# Patient Record
Sex: FEMALE | Race: WHITE | NOT HISPANIC OR LATINO | Employment: UNEMPLOYED | URBAN - METROPOLITAN AREA
[De-identification: names, ages, dates, MRNs, and addresses within clinical notes are randomized per-mention and may not be internally consistent; named-entity substitution may affect disease eponyms.]

---

## 2017-10-25 ENCOUNTER — TRANSFERRED RECORDS (OUTPATIENT)
Dept: HEALTH INFORMATION MANAGEMENT | Facility: CLINIC | Age: 57
End: 2017-10-25

## 2017-12-06 ENCOUNTER — TRANSFERRED RECORDS (OUTPATIENT)
Dept: HEALTH INFORMATION MANAGEMENT | Facility: CLINIC | Age: 57
End: 2017-12-06

## 2018-09-07 NOTE — PROGRESS NOTES
"  SUBJECTIVE:   Mary Irizarry is a 58 year old female who presents to clinic today for the following health issues:          Would like thyroid checked, sensitive to cold, constipation, dry skin, leg cramps    Ear crackles     Pt with symptoms above  Was told she was hypothyroid by her chiropractor  He treated her with some compounded vitamins and she felt better but they were expensive  She would like to be rechecked    Pt also with crackling in left ear intermittently for past week or so.   No recent flight  No pain or hearing difficulties      Problem list and histories reviewed & adjusted, as indicated.  Additional history: as documented    Labs reviewed in EPIC    Reviewed and updated as needed this visit by clinical staff       Reviewed and updated as needed this visit by Provider         ROS:  Constitutional, HEENT, cardiovascular, pulmonary, gi and gu systems are negative, except as otherwise noted.    OBJECTIVE:     /82  Pulse 56  Temp 97.1  F (36.2  C) (Oral)  Resp 19  Ht 5' 9\" (1.753 m)  Wt 171 lb (77.6 kg)  SpO2 95%  BMI 25.25 kg/m2  Body mass index is 25.25 kg/(m^2).  GENERAL: healthy, alert and no distress  HENT: ear canals and TM's normal, nose and mouth without ulcers or lesions  NECK: no adenopathy, no asymmetry, masses, or scars and thyroid normal to palpation  RESP: lungs clear to auscultation - no rales, rhonchi or wheezes  CV: regular rate and rhythm, normal S1 S2, no S3 or S4, no murmur, click or rub, no peripheral edema and peripheral pulses strong    Diagnostic Test Results:  none     ASSESSMENT/PLAN:     1. Constipation, unspecified constipation type  As above  - T3 total  - T4 free  - TSH    2. Cold sensitivity  As above  - T3 total  - T4 free  - TSH    3. Dry skin  As above  - T3 total  - T4 free  - TSH    4. Dysfunction of left eustachian tube  Trial of otc flonase.           Carol Hidalgo MD  [facility]    "

## 2018-09-10 ENCOUNTER — OFFICE VISIT (OUTPATIENT)
Dept: FAMILY MEDICINE | Facility: CLINIC | Age: 58
End: 2018-09-10
Payer: COMMERCIAL

## 2018-09-10 VITALS
DIASTOLIC BLOOD PRESSURE: 82 MMHG | TEMPERATURE: 97.1 F | WEIGHT: 171 LBS | BODY MASS INDEX: 25.33 KG/M2 | OXYGEN SATURATION: 95 % | SYSTOLIC BLOOD PRESSURE: 139 MMHG | RESPIRATION RATE: 19 BRPM | HEART RATE: 56 BPM | HEIGHT: 69 IN

## 2018-09-10 DIAGNOSIS — K59.00 CONSTIPATION, UNSPECIFIED CONSTIPATION TYPE: Primary | ICD-10-CM

## 2018-09-10 DIAGNOSIS — R68.89 COLD SENSITIVITY: ICD-10-CM

## 2018-09-10 DIAGNOSIS — H69.92 DYSFUNCTION OF LEFT EUSTACHIAN TUBE: ICD-10-CM

## 2018-09-10 DIAGNOSIS — L85.3 DRY SKIN: ICD-10-CM

## 2018-09-10 LAB
T3 SERPL-MCNC: 91 NG/DL (ref 60–181)
T4 FREE SERPL-MCNC: 0.84 NG/DL (ref 0.76–1.46)
TSH SERPL DL<=0.005 MIU/L-ACNC: 1.03 MU/L (ref 0.4–4)

## 2018-09-10 PROCEDURE — 36415 COLL VENOUS BLD VENIPUNCTURE: CPT | Performed by: FAMILY MEDICINE

## 2018-09-10 PROCEDURE — 84480 ASSAY TRIIODOTHYRONINE (T3): CPT | Performed by: FAMILY MEDICINE

## 2018-09-10 PROCEDURE — 84439 ASSAY OF FREE THYROXINE: CPT | Performed by: FAMILY MEDICINE

## 2018-09-10 PROCEDURE — 99203 OFFICE O/P NEW LOW 30 MIN: CPT | Performed by: FAMILY MEDICINE

## 2018-09-10 PROCEDURE — 84443 ASSAY THYROID STIM HORMONE: CPT | Performed by: FAMILY MEDICINE

## 2018-09-10 NOTE — LETTER
September 11, 2018    Mary Irizarry  [address] [facility] [address]        Dear Mary,    All 3 of your thyroid labs are normal.  No evidence of thyroid disease.    Carol Hidalgo MD    Results for orders placed or performed in visit on 09/10/18   T3 total   Result Value Ref Range    Triiodothyronine (T3) 91 60 - 181 ng/dL   T4 free   Result Value Ref Range    T4 Free 0.84 0.76 - 1.46 ng/dL   TSH   Result Value Ref Range    TSH 1.03 0.40 - 4.00 mU/L

## 2018-09-10 NOTE — MR AVS SNAPSHOT
"              After Visit Summary   9/10/2018    Mary Irizarry    MRN: [id number]           Patient Information     Date Of Birth          1960        Visit Information        Provider Department      9/10/2018 7:40 AM Carol Parada MD [facility]        Today's Diagnoses     Constipation, unspecified constipation type    -  1    Cold sensitivity        Dry skin           Follow-ups after your visit        Who to contact     If you have questions or need follow up information about today's clinic visit or your schedule please contact [facility] directly at [phone].  Normal or non-critical lab and imaging results will be communicated to you by [employer], letter or phone within 4 business days after the clinic has received the results. If you do not hear from us within 7 days, please contact the clinic through [employer] or phone. If you have a critical or abnormal lab result, we will notify you by phone as soon as possible.  Submit refill requests through [employer] or call your pharmacy and they will forward the refill request to us. Please allow 3 business days for your refill to be completed.          Additional Information About Your Visit        MyChart Information     [employer] lets you send messages to your doctor, view your test results, renew your prescriptions, schedule appointments and more. To sign up, go to [email or web address] . Click on \"Log in\" on the left side of the screen, which will take you to the Welcome page. Then click on \"Sign up Now\" on the right side of the page.     You will be asked to enter the access code listed below, as well as some personal information. Please follow the directions to create your username and password.     Your access code is: [email or web address]  Expires: 2018  8:17 AM     Your access code will  in 90 days. If you need help or a new code, please call your [facility] or [phone].        Care EveryWhere ID     This " "is your Care EveryWhere ID. This could be used by other organizations to access your [place] medical records  [id number]        Your Vitals Were     Pulse Temperature Respirations Height Pulse Oximetry BMI (Body Mass Index)    56 97.1  F (36.2  C) (Oral) 19 5' 9\" (1.753 m) 95% 25.25 kg/m2       Blood Pressure from Last 3 Encounters:   09/10/18 139/82    Weight from Last 3 Encounters:   09/10/18 171 lb (77.6 kg)              We Performed the Following     T3 total     T4 free     TSH        Primary Care Provider Office Phone # Fax #    [facility] [place] [phone]       No address on file        Equal Access to Services     DENNIS ERICKSON : Ildefonso Lucero, sergio vásquez, qaminor kaalmada jh, artis vargas . So [facility] [phone].    ATENCIÓN: Si habla español, tiene a hallman disposición servicios gratuitos de asistencia lingüística. Llame al [phone].    We comply with applicable federal civil rights laws and [place] laws. We do not discriminate on the basis of race, color, national origin, age, disability, sex, sexual orientation, or gender identity.            Thank you!     Thank you for choosing [facility]  [facility]. Our goal is always to provide you with excellent care. Hearing back from our patients is one way we can continue to improve our services. Please take a few minutes to complete the written survey that you may receive in the mail after your visit with us. Thank you!             Your Updated Medication List - Protect others around you: Learn how to safely use, store and throw away your medicines at www.disposemymeds.org.      Notice  As of 9/10/2018  8:18 AM    You have not been prescribed any medications.      "

## 2018-10-11 ENCOUNTER — TELEPHONE (OUTPATIENT)
Dept: FAMILY MEDICINE | Facility: CLINIC | Age: 58
End: 2018-10-11

## 2018-10-26 NOTE — TELEPHONE ENCOUNTER
Mammo completed 12/6/2017 seen in Care Everywhere, not due until 12/6/2019, reflected in HM. Info sent to abstracting to update chart.

## 2018-12-10 ENCOUNTER — TELEPHONE (OUTPATIENT)
Dept: FAMILY MEDICINE | Facility: CLINIC | Age: 58
End: 2018-12-10

## 2018-12-10 NOTE — TELEPHONE ENCOUNTER
Please abstract the following data from this visit with this patient into the appropriate field in Epic:    Colonoscopy done on this date: 10-, by this group: [facility], results were repeat in 5 years.     Care everywhere was completed.

## 2018-12-11 ENCOUNTER — OFFICE VISIT (OUTPATIENT)
Dept: FAMILY MEDICINE | Facility: CLINIC | Age: 58
End: 2018-12-11
Payer: COMMERCIAL

## 2018-12-11 VITALS
TEMPERATURE: 97.4 F | SYSTOLIC BLOOD PRESSURE: 113 MMHG | HEART RATE: 63 BPM | RESPIRATION RATE: 16 BRPM | WEIGHT: 173 LBS | OXYGEN SATURATION: 97 % | DIASTOLIC BLOOD PRESSURE: 76 MMHG | BODY MASS INDEX: 25.55 KG/M2

## 2018-12-11 DIAGNOSIS — I73.00 RAYNAUD'S DISEASE WITHOUT GANGRENE: ICD-10-CM

## 2018-12-11 DIAGNOSIS — B00.9 HERPES SIMPLEX VIRUS INFECTION: Primary | ICD-10-CM

## 2018-12-11 DIAGNOSIS — Z78.0 POSTMENOPAUSAL STATUS: ICD-10-CM

## 2018-12-11 DIAGNOSIS — M79.642 PAIN IN BOTH HANDS: ICD-10-CM

## 2018-12-11 DIAGNOSIS — M79.641 PAIN IN BOTH HANDS: ICD-10-CM

## 2018-12-11 PROCEDURE — 99214 OFFICE O/P EST MOD 30 MIN: CPT | Performed by: INTERNAL MEDICINE

## 2018-12-11 RX ORDER — ACYCLOVIR 400 MG/1
TABLET ORAL
Refills: 3 | COMMUNITY
Start: 2018-12-07 | End: 2018-12-11

## 2018-12-11 RX ORDER — ACYCLOVIR 400 MG/1
TABLET ORAL
Qty: 90 TABLET | Refills: 3 | Status: SHIPPED | OUTPATIENT
Start: 2018-12-11 | End: 2020-03-11

## 2018-12-11 ASSESSMENT — PAIN SCALES - GENERAL: PAINLEVEL: NO PAIN (0)

## 2018-12-11 NOTE — LETTER
December 11, 2018      Mary Irizarry  [address] [facility] [address]        To Whom It May Concern:    Mary Irizarry was seen in our clinic. She has a medical condition that is exacerbated by cold.  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       Sincerely,        Dariana Martines MD

## 2018-12-11 NOTE — PROGRESS NOTES
SUBJECTIVE:  Mary Irizarry is an 58 year old female who presents for needing a note.  Wonders if in past has had frostbite in past and she has trouble with fingers and toes because of that when she is in the cold.  Her job is wanting her to go into the cooler and she has pain in fingers.  Fingers sometimes get whitish when they are cold.  She also is concerned about post-menopausal and has drier skin and some trouble sleeping and being constipated and sometimes a little ryan.  Not get night sweats any more.  Also has h/o herpes in genital area, on buttocks and on face.  Takes acyclovir when breaks out and then started daily dose for suppression which helped a lot, then stopped taking it and is now having more outbreaks.      PMH:  herpes    Social History     Socioeconomic History     Marital status: Single     Spouse name: None     Number of children: None     Years of education: None     Highest education level: None   Social Needs     Financial resource strain: None     Food insecurity - worry: None     Food insecurity - inability: None     Transportation needs - medical: None     Transportation needs - non-medical: None   Occupational History     None   Tobacco Use     Smoking status: Former Smoker     Smokeless tobacco: Never Used   Substance and Sexual Activity     Alcohol use: Yes     Drug use: No     Sexual activity: No   Other Topics Concern     Parent/sibling w/ CABG, MI or angioplasty before 65F 55M? Not Asked   Social History Narrative     None     Family History   Problem Relation Age of Onset     Diabetes Father        ALLERGIES:  Penicillins    Current Outpatient Medications   Medication     acyclovir (ZOVIRAX) 400 MG tablet     No current facility-administered medications for this visit.          ROS:  ROS is done and is negative for general/constitutional, eye, ENT, Respiratory, cardiovascular, GI, , Skin, musculoskeletal except as noted elsewhere.  All other review of systems negative  except as noted elsewhere.      OBJECTIVE:  /76   Pulse 63   Temp 97.4  F (36.3  C) (Oral)   Resp 16   Wt 78.5 kg (173 lb)   SpO2 97%   BMI 25.55 kg/m    GENERAL APPEARANCE: Alert, in no acute distress  EYES: normal  NOSE:normal  OROPHARYNX:normal  NECK:No adenopathy,masses or thyromegaly  RESP: normal and clear to auscultation  CV:regular rate and rhythm and no murmurs, clicks, or gallops  ABDOMEN: Abdomen soft, non-tender. BS normal. No masses, organomegaly  SKIN: no ulcers, lesions or rash  MUSCULOSKELETAL:Musculoskeletal normal      RESULTS  Results for orders placed or performed in visit on 09/10/18   T3 total   Result Value Ref Range    Triiodothyronine (T3) 91 60 - 181 ng/dL   T4 free   Result Value Ref Range    T4 Free 0.84 0.76 - 1.46 ng/dL   TSH   Result Value Ref Range    TSH 1.03 0.40 - 4.00 mU/L   .  No results found for this or any previous visit (from the past 48 hour(s)).    ASSESSMENT/PLAN:    ASSESSMENT / PLAN:  (B00.9) Herpes simplex virus infection  (primary encounter diagnosis)  Comment: pt has h/o this and did fairly well with suppressive daily therapy, but has had more flare ups since she stopped it  Plan: acyclovir (ZOVIRAX) 400 MG tablet        Advised pt to continue on daily suppressive therapy.  Will refill the acyclovir which she was on before. Reviewed medication instructions and side effects. Follow up if experiences side effects.. I reviewed supportive care, otc meds to use if needed, expected course, and signs of concern.  Follow up as needed or if she does not improveor if worsens in any way.  Reviewed red flag symptoms and is to go to the ER if experiences any of these.    (Z78.0) Postmenopausal status  Comment: will refer to gyn to address her concerns and sxs  Plan: OB/GYN REFERRAL        Pt to schedule with gyn    (M79.641,  M79.642) Pain in both hands  Comment: suspect pt has Raynauds as all fingers are affected and she does report some discoloration of fingers when  they are cold.  She does not have a specific incident of frostbite she recalls, but even as a child felt her fingers would hurt when they were cold.   Plan: note for work done.  Discussed medication options, but she doesn't feel it generally bad enough to want to take medications at this time. I reviewed supportive care, otc meds to use if needed, expected course, and signs of concern.  Follow up as needed or if worsens in any way.  Reviewed red flag symptoms and is to go to the ER if experiences any of these.      See [facility] for orders, medications, letters, patient instructions    Dariana Martines M.D.

## 2018-12-11 NOTE — NURSING NOTE
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    Weight as of this encounter: 78.5 kg (173 lb).  Medication Reconciliation: complete  Bruce Alfaro MA    "

## 2019-01-01 ENCOUNTER — NURSE TRIAGE (OUTPATIENT)
Dept: NURSING | Facility: CLINIC | Age: 59
End: 2019-01-01

## 2019-01-01 ENCOUNTER — OFFICE VISIT (OUTPATIENT)
Dept: URGENT CARE | Facility: URGENT CARE | Age: 59
End: 2019-01-01
Payer: COMMERCIAL

## 2019-01-01 VITALS
RESPIRATION RATE: 14 BRPM | DIASTOLIC BLOOD PRESSURE: 77 MMHG | HEART RATE: 71 BPM | HEIGHT: 69 IN | WEIGHT: 170 LBS | SYSTOLIC BLOOD PRESSURE: 110 MMHG | TEMPERATURE: 98.2 F | BODY MASS INDEX: 25.18 KG/M2 | OXYGEN SATURATION: 97 %

## 2019-01-01 DIAGNOSIS — J30.89 SEASONAL ALLERGIC RHINITIS DUE TO OTHER ALLERGIC TRIGGER: Primary | ICD-10-CM

## 2019-01-01 DIAGNOSIS — R05.9 COUGH: ICD-10-CM

## 2019-01-01 PROCEDURE — 99214 OFFICE O/P EST MOD 30 MIN: CPT | Performed by: NURSE PRACTITIONER

## 2019-01-01 RX ORDER — CETIRIZINE HYDROCHLORIDE 10 MG/1
10 TABLET ORAL EVERY EVENING
Qty: 14 TABLET | Refills: 0 | Status: SHIPPED | OUTPATIENT
Start: 2019-01-01 | End: 2019-03-11

## 2019-01-01 RX ORDER — DOXYCYCLINE HYCLATE 100 MG
100 TABLET ORAL 2 TIMES DAILY
Qty: 20 TABLET | Refills: 0 | Status: SHIPPED | OUTPATIENT
Start: 2019-01-01 | End: 2019-03-11

## 2019-01-01 RX ORDER — FLUTICASONE PROPIONATE 50 MCG
1-2 SPRAY, SUSPENSION (ML) NASAL DAILY
Qty: 1 BOTTLE | Refills: 0 | Status: SHIPPED | OUTPATIENT
Start: 2019-01-01 | End: 2019-03-11

## 2019-01-01 ASSESSMENT — MIFFLIN-ST. JEOR: SCORE: 1420.49

## 2019-01-01 ASSESSMENT — ENCOUNTER SYMPTOMS
RHINORRHEA: 1
COUGH: 1

## 2019-01-01 ASSESSMENT — PAIN SCALES - GENERAL: PAINLEVEL: SEVERE PAIN (6)

## 2019-01-01 NOTE — PROGRESS NOTES
"SUBJECTIVE:   Mary Irizarry is a 58 year old female presenting with a chief complaint of   Chief Complaint   Patient presents with     Sinus Problem       She is an established patient of [place].    URI Adult    Onset of symptoms was 3 day(s) ago.  Course of illness is worsening.    Severity moderate  Current and Associated symptoms: chills, sweats, runny nose, stuffy nose, cough - productive, headache and post nasal drip  Treatment measures tried include OTC Cough med.  Predisposing factors include None.      Review of Systems   HENT: Positive for congestion, postnasal drip and rhinorrhea.    Respiratory: Positive for cough.    All other systems reviewed and are negative.      History reviewed. No pertinent past medical history.  Family History   Problem Relation Age of Onset     Diabetes Father      Current Outpatient Medications   Medication Sig Dispense Refill     acyclovir (ZOVIRAX) 400 MG tablet TAKE 1 (ONE) TABLET BY MOUTH DAILY FOR HERPES SUPPRESSION 90 tablet 3     cetirizine (ZYRTEC) 10 MG tablet Take 1 tablet (10 mg) by mouth every evening for 14 days 14 tablet 0     doxycycline hyclate (VIBRA-TABS) 100 MG tablet Take 1 tablet (100 mg) by mouth 2 times daily for 10 days 20 tablet 0     fluticasone (FLONASE) 50 MCG/ACT nasal spray Spray 1-2 sprays into both nostrils daily for 7 days 1 Bottle 0     Social History     Tobacco Use     Smoking status: Former Smoker     Smokeless tobacco: Never Used   Substance Use Topics     Alcohol use: Yes       OBJECTIVE  /77 (BP Location: Left arm, Patient Position: Chair, Cuff Size: Adult Regular)   Pulse 71   Temp 98.2  F (36.8  C) (Oral)   Resp 14   Ht 1.753 m (5' 9\")   Wt 77.1 kg (170 lb)   SpO2 97%   BMI 25.10 kg/m      Physical Exam   HENT:   Nose: Mucosal edema and rhinorrhea present.   Mouth/Throat: Uvula is midline, oropharynx is clear and moist and mucous membranes are normal.   Cardiovascular: Normal rate, S1 normal, S2 normal and normal " heart sounds.   Pulmonary/Chest: Effort normal and breath sounds normal.   Neurological: She is alert.   Psychiatric: Her speech is normal and behavior is normal.     ASSESSMENT:      ICD-10-CM    1. Seasonal allergic rhinitis due to other allergic trigger J30.89 fluticasone (FLONASE) 50 MCG/ACT nasal spray     cetirizine (ZYRTEC) 10 MG tablet   2. Cough R05 doxycycline hyclate (VIBRA-TABS) 100 MG tablet        Differential Diagnosis:  URI Adult/Peds:  Pneumonia, Sinusitis and Viral upper respiratory illness    Serious Comorbid Conditions:  Adult:  None    PLAN:  I have discussed with the patient that this is a viral respiratory  illness and to use symptomatic treatment.  Patient would like to use an antibiotic if the symptoms are getting worse. I will write a prescription of doxycycline to fill if symptoms are getting worse  Patient educational/instructional material provided including reasons for follow-up    The patient indicates understanding of these issues and agrees with the plan.        Patient Instructions     At [facility], we strive to deliver an exceptional experience to you, every time we see you.  If you receive a survey in the mail, please send us back your thoughts. We really do value your feedback.    Based on your medical history, these are the current health maintenance/preventive care services that you are due for (some may have been done at this visit.)  Health Maintenance Due   Topic Date Due     PHQ-2 Q1 YR  03/15/1973     HIV SCREEN (SYSTEM ASSIGNED)  03/15/1979     HEPATITIS C SCREENING  03/15/1979     PAP SCREENING Q3 YR (SYSTEM ASSIGNED)  03/15/1982     LIPID SCREEN Q5 YR FEMALE (SYSTEM ASSIGNED)  03/15/2006     ZOSTER IMMUNIZATION (1 of 2) 03/15/2011     ADVANCE DIRECTIVE PLANNING Q5 YRS  03/15/2016     INFLUENZA VACCINE (1) 09/01/2018         Suggested websites for health information:  Www.[place].org : Up to date and easily searchable information on multiple  topics.  Www.medlineplus.gov : medication info, interactive tutorials, watch real surgeries online  Www.familydoctor.org : good info from the Academy of Family Physicians  Www.cdc.gov : public health info, travel advisories, epidemics (H1N1)  Www.aap.org : children's health info, normal development, vaccinations  Www.health.[facility].[place].us : [place] dept of health, public health issues in [place], N1N1    Your care team:                            Family Medicine Internal Medicine   MD Armond Robles MD Shantel Branch-Fleming, MD Katya Georgiev PA-C Nam Ho, MD Pediatrics   JUAN F Mendoza, CNP Kalie Strauss APRN CNP   MD Alexandra Stevens MD Deborah Mielke, MD Kim Thein, APRN CNP      Clinic hours: Monday - Thursday 7 am-7 pm; Fridays 7 am-5 pm.   Urgent care: Monday - Friday 11 am-9 pm; Saturday and Sunday 9 am-5 pm.  Pharmacy : Monday -Thursday 8 am-8 pm; Friday 8 am-6 pm; Saturday and Sunday 9 am-5 pm.     Clinic: [phone]   Pharmacy: [phone]    Patient Education     Allergic Rhinitis  Allergic rhinitis is an allergic reaction that affects the nose, and often the eyes. It s often known as nasal allergies. Nasal allergies are often due to things in the environment that are breathed in. Depending what you are sensitive to, nasal allergies may occur only during certain seasons. Or they may occur year round. Common indoor allergens include house dust mites, mold, cockroaches, and pet dander. Outdoor allergens include pollen from trees, grasses, and weeds.   Symptoms include a drippy, stuffy, and itchy nose. They also include sneezing and red and itchy eyes. You may feel tired more often. Severe allergies may also affect your breathing and trigger a condition called asthma.   Tests can be done to see what allergens are affecting you. You may be referred to an allergy specialist for testing and further evaluation.  Home care  Your healthcare provider may  prescribe medicines to help relieve allergy symptoms. These may include oral medicines, nasal sprays, or eye drops.  Ask your provider for advice on how to avoid substances that you are allergic to. Below are a few tips for each type of allergen.  Pet dander:    Do not have pets with fur and feathers.    If you can't avoid having a pet, keep it out of your bedroom and off upholstered furniture.  Pollen:    When pollen counts are high, keep windows of your car and home closed. If possible, use an air conditioner instead.    Wear a filter mask when mowing or doing yard work.  House dust mites:    Wash bedding every week in warm water and detergent and dry on a hot setting.    Cover the mattress, box spring, and pillows with allergy covers.     If possible, sleep in a room with no carpet, curtains, or upholstered furniture.  Cockroaches:    Store food in sealed containers.    Remove garbage from the home promptly.    Fix water leaks  Mold:    Keep humidity low by using a dehumidifier or air conditioner. Keep the dehumidifier and air conditioner clean and free of mold.    Clean moldy areas with bleach and water.  In general:    Vacuum once or twice a week. If possible, use a vacuum with a high-efficiency particulate air (HEPA) filter.    Do not smoke. Avoid cigarette smoke. Cigarette smoke is an irritant that can make symptoms worse.  Follow-up care  Follow up as advised by the healthcare provider or our staff. If you were referred to an allergy specialist, make this appointment promptly.  When to seek medical advice  Call your healthcare provider right away if the following occur:    Coughing or wheezing    Fever of 100.4 F (38 C) or higher, or as directed by your healthcare provider    Raised red bumps (hives)    Continuing symptoms, new symptoms, or worsening symptoms  Call 911 if you have:    Trouble breathing    Severe swelling of the face or severe itching of the eyes or mouth  Date Last Reviewed: 3/1/2017     [id number] [employer]. [address] [place] [address], [place] [address]. All rights reserved. This information is not intended as a substitute for professional medical care. Always follow your healthcare professional's instructions.

## 2019-01-01 NOTE — TELEPHONE ENCOUNTER
"Patient calling reporting \"sinus\" symptoms starting in past 2 days. Patient reporting symptoms started with \"a really bad headache.\" Reporting yellow sputum. Mild facial swelling. Afebrile. Patient reporting headache improves with decongestant. Stating she has previous history of sinus infections.   Per guidelines advised to be seen with in 4 hours. Caller verbalized understanding. Denies further questions.    Mirella Curtis RN  [facility]      Reason for Disposition    [1] Redness or swelling on the cheek, forehead or around the eye AND [2] no fever    Additional Information    Negative: Severe difficulty breathing (e.g., struggling for each breath, speaks in single words)    Negative: Sounds like a life-threatening emergency to the triager    Negative: [1] Sinus infection AND [2] taking an antibiotic AND [3] symptoms continue    Negative: [1] Difficulty breathing AND [2] not from stuffy nose (e.g., not relieved by cleaning out the nose)    Negative: [1] SEVERE headache AND [2] fever    Negative: [1] Redness or swelling on the cheek, forehead or around the eye AND [2] fever    Negative: Fever > 104 F (40 C)    Negative: Patient sounds very sick or weak to the triager    Negative: [1] SEVERE pain AND [2] not improved 2 hours after pain medicine    Protocols used: SINUS PAIN OR CONGESTION-ADULT-      "

## 2019-01-01 NOTE — PATIENT INSTRUCTIONS
At [facility], we strive to deliver an exceptional experience to you, every time we see you.  If you receive a survey in the mail, please send us back your thoughts. We really do value your feedback.    Based on your medical history, these are the current health maintenance/preventive care services that you are due for (some may have been done at this visit.)  Health Maintenance Due   Topic Date Due     PHQ-2 Q1 YR  03/15/1973     HIV SCREEN (SYSTEM ASSIGNED)  03/15/1979     HEPATITIS C SCREENING  03/15/1979     PAP SCREENING Q3 YR (SYSTEM ASSIGNED)  03/15/1982     LIPID SCREEN Q5 YR FEMALE (SYSTEM ASSIGNED)  03/15/2006     ZOSTER IMMUNIZATION (1 of 2) 03/15/2011     ADVANCE DIRECTIVE PLANNING Q5 YRS  03/15/2016     INFLUENZA VACCINE (1) 09/01/2018         Suggested websites for health information:  Www.BroadLight.[employer] : Up to date and easily searchable information on multiple topics.  Www.SocialMedia305.gov : medication info, interactive tutorials, watch real surgeries online  Www.familydoctor.org : good info from the Academy of Family Physicians  Www.cdc.gov : public health info, travel advisories, epidemics (H1N1)  Www.aap.org : children's health info, normal development, vaccinations  Www.health.[facility].[place].us : [place] dept of health, public health issues in [place], N1N1    Your care team:                            Family Medicine Internal Medicine   MD Armond Robles MD Shantel Branch-Fleming, MD Katya Georgiev PA-C Nam Ho, MD Pediatrics   JUAN F Mendoza, MD Alexandra Chiu CNP, MD Deborah Mielke, MD Kim Thein, APRN CNP      Clinic hours: Monday - Thursday 7 am-7 pm; Fridays 7 am-5 pm.   Urgent care: Monday - Friday 11 am-9 pm; Saturday and Sunday 9 am-5 pm.  Pharmacy : Monday -Thursday 8 am-8 pm; Friday 8 am-6 pm; Saturday and Sunday 9 am-5 pm.     Clinic: [phone]   Pharmacy: [phone]    Patient  Education     Allergic Rhinitis  Allergic rhinitis is an allergic reaction that affects the nose, and often the eyes. It s often known as nasal allergies. Nasal allergies are often due to things in the environment that are breathed in. Depending what you are sensitive to, nasal allergies may occur only during certain seasons. Or they may occur year round. Common indoor allergens include house dust mites, mold, cockroaches, and pet dander. Outdoor allergens include pollen from trees, grasses, and weeds.   Symptoms include a drippy, stuffy, and itchy nose. They also include sneezing and red and itchy eyes. You may feel tired more often. Severe allergies may also affect your breathing and trigger a condition called asthma.   Tests can be done to see what allergens are affecting you. You may be referred to an allergy specialist for testing and further evaluation.  Home care  Your healthcare provider may prescribe medicines to help relieve allergy symptoms. These may include oral medicines, nasal sprays, or eye drops.  Ask your provider for advice on how to avoid substances that you are allergic to. Below are a few tips for each type of allergen.  Pet dander:    Do not have pets with fur and feathers.    If you can't avoid having a pet, keep it out of your bedroom and off upholstered furniture.  Pollen:    When pollen counts are high, keep windows of your car and home closed. If possible, use an air conditioner instead.    Wear a filter mask when mowing or doing yard work.  House dust mites:    Wash bedding every week in warm water and detergent and dry on a hot setting.    Cover the mattress, box spring, and pillows with allergy covers.     If possible, sleep in a room with no carpet, curtains, or upholstered furniture.  Cockroaches:    Store food in sealed containers.    Remove garbage from the home promptly.    Fix water leaks  Mold:    Keep humidity low by using a dehumidifier or air conditioner. Keep the  dehumidifier and air conditioner clean and free of mold.    Clean moldy areas with bleach and water.  In general:    Vacuum once or twice a week. If possible, use a vacuum with a high-efficiency particulate air (HEPA) filter.    Do not smoke. Avoid cigarette smoke. Cigarette smoke is an irritant that can make symptoms worse.  Follow-up care  Follow up as advised by the healthcare provider or our staff. If you were referred to an allergy specialist, make this appointment promptly.  When to seek medical advice  Call your healthcare provider right away if the following occur:    Coughing or wheezing    Fever of 100.4 F (38 C) or higher, or as directed by your healthcare provider    Raised red bumps (hives)    Continuing symptoms, new symptoms, or worsening symptoms  Call 911 if you have:    Trouble breathing    Severe swelling of the face or severe itching of the eyes or mouth  Date Last Reviewed: 3/1/2017    5010-6529 [employer]. [address] [place] [address]. All rights reserved. This information is not intended as a substitute for professional medical care. Always follow your healthcare professional's instructions.

## 2019-01-05 ENCOUNTER — ANCILLARY PROCEDURE (OUTPATIENT)
Dept: MAMMOGRAPHY | Facility: CLINIC | Age: 59
End: 2019-01-05
Payer: COMMERCIAL

## 2019-01-05 DIAGNOSIS — Z12.31 VISIT FOR SCREENING MAMMOGRAM: ICD-10-CM

## 2019-01-05 PROCEDURE — 77067 SCR MAMMO BI INCL CAD: CPT | Mod: TC

## 2019-01-10 ENCOUNTER — OFFICE VISIT (OUTPATIENT)
Dept: OBGYN | Facility: CLINIC | Age: 59
End: 2019-01-10
Payer: COMMERCIAL

## 2019-01-10 VITALS
WEIGHT: 176.2 LBS | TEMPERATURE: 98.4 F | BODY MASS INDEX: 26.02 KG/M2 | OXYGEN SATURATION: 99 % | DIASTOLIC BLOOD PRESSURE: 77 MMHG | SYSTOLIC BLOOD PRESSURE: 118 MMHG | HEART RATE: 53 BPM

## 2019-01-10 DIAGNOSIS — Z78.0 MENOPAUSE: Primary | ICD-10-CM

## 2019-01-10 PROCEDURE — 99203 OFFICE O/P NEW LOW 30 MIN: CPT | Performed by: OBSTETRICS & GYNECOLOGY

## 2019-01-10 NOTE — PATIENT INSTRUCTIONS
If you have any questions regarding your visit, Please contact your care team.    [facility]: [phone]      Women s Health CLINIC HOURS TELEPHONE NUMBER   MD Alisha Roach CMA Lisa -    ADON[place]       Monday:       7:30-4:30 [place]  Wednesday:       7:30-4:30 [place]  Thursday:       7:30-1:30 [place]  Friday:       7:30-11:30 [facility]  [address] [place]  [address]  [phone] ask for [facility]  [address] [place] [address]  [phone] ask for [facility]    Imaging Scheduling for [place]:  [phone]    Imaging Scheduling for [place]: [phone]       Urgent Care locations:    [facility] Saturday and Sunday   9 am - 5 pm    Monday-Friday   12 pm - 8 pm  Saturday and Sunday   9 am - 5 pm   [phone]     [facility] Labor and Delivery:  [phone]    If you need a medication refill, please contact your pharmacy. Please allow 3 business days for your refill to be completed.  As always, Thank you for trusting us with your healthcare needs!

## 2019-01-29 NOTE — PROGRESS NOTES
Mary is a 58 year old   here to discuss menopausal symptoms.  .  ROS: No urinary frequency or dysuria, bladder or kidney problems  ROS: Ten point review of systems was reviewed and negative except the above.    PMH: Her past medical, surgical, and obstetric histories were reviewed and are documented in their appropriate chart areas.    ALL/Meds: Her medication and allergy histories were reviewed and are documented in their appropriate chart areas.    SH/FMH: Reviewed and documented in the appropriate area.    PE: /77   Pulse 53   Temp 98.4  F (36.9  C) (Oral)   Wt 79.9 kg (176 lb 3.2 oz)   SpO2 99%   BMI 26.02 kg/m       I discussed menopause and how the pituitary gland controls ovarian function with FSH.  We discussed how menopause is the cessation of ovarian ovulation and significant estrogen production.  I explained that hot flushes and night sweats are in response to hypo-estrogenism.  Discussed hormone replacement therapy.  Explained that the primary use is for vasomotor symptom control as well as for urogenital symptoms.  Discussed the concerns related to systemic hormones including, but not limited to, increased risks of cardiovascular complications such as heart attack and increased risk of breast cancer.  I did discuss our current understanding of these risks as well as the controversy surrounding some of the study results.    We have reviwed her past medical and family history as it relates to thrombotic, cardiovascular and cancer risks.    She appears to understand and has decided to decline HRT.    A/P:   Mary presents with (Z78.0) Menopause  (primary encounter diagnosis)  Comment: Out of 30 minutes spent face to face with the patient, > 50% of this was spent in consultation.  Plan: She will follow up as needed            -    No orders of the defined types were placed in this encounter.        Jonathan Hu MD FACOG

## 2019-02-15 ENCOUNTER — HEALTH MAINTENANCE LETTER (OUTPATIENT)
Age: 59
End: 2019-02-15

## 2019-02-19 ENCOUNTER — OFFICE VISIT (OUTPATIENT)
Dept: FAMILY MEDICINE | Facility: CLINIC | Age: 59
End: 2019-02-19
Payer: COMMERCIAL

## 2019-02-19 VITALS
OXYGEN SATURATION: 98 % | SYSTOLIC BLOOD PRESSURE: 138 MMHG | TEMPERATURE: 97.8 F | DIASTOLIC BLOOD PRESSURE: 87 MMHG | BODY MASS INDEX: 25.55 KG/M2 | WEIGHT: 173 LBS | HEART RATE: 64 BPM

## 2019-02-19 DIAGNOSIS — J02.9 SORE THROAT: Primary | ICD-10-CM

## 2019-02-19 LAB
DEPRECATED S PYO AG THROAT QL EIA: NORMAL
SPECIMEN SOURCE: NORMAL

## 2019-02-19 PROCEDURE — 87081 CULTURE SCREEN ONLY: CPT | Performed by: INTERNAL MEDICINE

## 2019-02-19 PROCEDURE — 99213 OFFICE O/P EST LOW 20 MIN: CPT | Performed by: INTERNAL MEDICINE

## 2019-02-19 PROCEDURE — 87880 STREP A ASSAY W/OPTIC: CPT | Performed by: INTERNAL MEDICINE

## 2019-02-19 NOTE — PROGRESS NOTES
SUBJECTIVE:  Mary Irizarry is an 58 year old female who presents for throat issue.  Throat has felt weird. Feels irritated, alana in morning, during day feels dry and like something is stuck there.  Has had for 2-3 days.   Has had a little bit of headache on and off for 2-3 days.  No fevers, chills, sweats.  No v/d.  Mild nausea.  Took some aleve a couple days ago which didn't help headache.  No recent travel.  No skin rashes.  Some runny nose.  No cough.  No ear pain.  People at work have been sick.       PMH:  Patient Active Problem List   Diagnosis     Raynaud's syndrome     Herpes simplex virus infection     Social History     Socioeconomic History     Marital status: Single     Spouse name: None     Number of children: None     Years of education: None     Highest education level: None   Social Needs     Financial resource strain: None     Food insecurity - worry: None     Food insecurity - inability: None     Transportation needs - medical: None     Transportation needs - non-medical: None   Occupational History     None   Tobacco Use     Smoking status: Former Smoker     Smokeless tobacco: Never Used   Substance and Sexual Activity     Alcohol use: Yes     Drug use: No     Sexual activity: No   Other Topics Concern     Parent/sibling w/ CABG, MI or angioplasty before 65F 55M? Not Asked   Social History Narrative     None     Family History   Problem Relation Age of Onset     Diabetes Father        ALLERGIES:  Penicillins    Current Outpatient Medications   Medication     acyclovir (ZOVIRAX) 400 MG tablet     melatonin 5 MG CAPS     No current facility-administered medications for this visit.          ROS:  ROS is done and is negative for general/constitutional, eye, ENT, Respiratory, cardiovascular, GI, , Skin, musculoskeletal except as noted elsewhere.  All other review of systems negative except as noted elsewhere.      OBJECTIVE:  /87   Pulse 64   Temp 97.8  F (36.6  C) (Oral)   Wt 78.5 kg  (173 lb)   SpO2 98%   BMI 25.55 kg/m    GENERAL APPEARANCE: Alert, in no acute distress  EYES: normal  EARS: External ears normal. Canals clear. TM's normal.  NOSE:mild clear discharge and moderately inflamed mucosa  OROPHARYNX:mild erythema, no tonsillar hypertrophy and no exudates present  NECK:No adenopathy,masses or thyromegaly  RESP: normal and clear to auscultation  CV:regular rate and rhythm and no murmurs, clicks, or gallops  ABDOMEN: Abdomen soft, non-tender. BS normal. No masses, organomegaly  SKIN: no ulcers, lesions or rash  MUSCULOSKELETAL:Musculoskeletal normal      RESULTS  Results for orders placed or performed in visit on 02/19/19   Strep, Rapid Screen   Result Value Ref Range    Specimen Description Throat     Rapid Strep A Screen       NEGATIVE: No Group A streptococcal antigen detected by immunoassay, await culture report.   .  No results found for this or any previous visit (from the past 48 hour(s)).    ASSESSMENT/PLAN:    ASSESSMENT / PLAN:  (J02.9) Sore throat  (primary encounter diagnosis)  Comment: neg rapid strep.  C/w viral etiology.  Suspect post-nasal drainage contributing to sxs.  Plan: Strep, Rapid Screen, Beta strep group A culture        I reviewed supportive care, otc meds to use if needed including flonase nasal spray, expected course, and signs of concern.  Follow up as needed or if she does not improve within 7 day(s) or if worsens in any way.  Reviewed red flag symptoms and is to go to the ER if experiences any of these.      See [facility] for orders, medications, letters, patient instructions    Dariana Martines M.D.

## 2019-02-20 LAB
BACTERIA SPEC CULT: NORMAL
SPECIMEN SOURCE: NORMAL

## 2019-03-11 ENCOUNTER — TELEPHONE (OUTPATIENT)
Dept: FAMILY MEDICINE | Facility: CLINIC | Age: 59
End: 2019-03-11

## 2019-03-11 ENCOUNTER — OFFICE VISIT (OUTPATIENT)
Dept: FAMILY MEDICINE | Facility: CLINIC | Age: 59
End: 2019-03-11
Payer: COMMERCIAL

## 2019-03-11 VITALS
TEMPERATURE: 97.5 F | SYSTOLIC BLOOD PRESSURE: 127 MMHG | RESPIRATION RATE: 16 BRPM | BODY MASS INDEX: 25.1 KG/M2 | HEART RATE: 60 BPM | OXYGEN SATURATION: 98 % | DIASTOLIC BLOOD PRESSURE: 85 MMHG | WEIGHT: 170 LBS

## 2019-03-11 DIAGNOSIS — J30.2 SEASONAL ALLERGIC RHINITIS, UNSPECIFIED TRIGGER: Primary | ICD-10-CM

## 2019-03-11 PROCEDURE — 99213 OFFICE O/P EST LOW 20 MIN: CPT | Performed by: HOSPITALIST

## 2019-03-11 RX ORDER — FLUTICASONE PROPIONATE 50 MCG
1 SPRAY, SUSPENSION (ML) NASAL 2 TIMES DAILY
Qty: 16 G | Refills: 0 | Status: SHIPPED | OUTPATIENT
Start: 2019-03-11 | End: 2020-03-11

## 2019-03-11 ASSESSMENT — PAIN SCALES - GENERAL: PAINLEVEL: NO PAIN (0)

## 2019-03-11 NOTE — TELEPHONE ENCOUNTER
Pt seen by same day provider today.  Left message on answering machine for patient to call back to [phone].  Need to know if she has been treated for allergy's in past?  Why requesting referral now?  Does she want to try medication given today before referral, pmd can manage if they are working well?  Wendy BAUMANN, RN

## 2019-03-11 NOTE — TELEPHONE ENCOUNTER
Left message on answering machine for patient to call back to [phone].  Wendy Albrecht BSN, RN

## 2019-03-11 NOTE — PROGRESS NOTES
Pt came here for nasal congestion and chest congestion. Started about 1 month ago. Was seen by an urgent care. Has strep test and was negative. She try to take over the counter stuff and it does not help. No fever or chill.     Allergies   Allergen Reactions     Penicillins Rash       No past medical history on file.      Current Outpatient Medications on File Prior to Visit:  acyclovir (ZOVIRAX) 400 MG tablet TAKE 1 (ONE) TABLET BY MOUTH DAILY FOR HERPES SUPPRESSION   melatonin 5 MG CAPS Take 5 mg by mouth     No current facility-administered medications on file prior to visit.     Social History     Tobacco Use     Smoking status: Former Smoker     Smokeless tobacco: Never Used   Substance Use Topics     Alcohol use: Yes       ROS:  12 point ROS is done and aside that mention above all other review of system is negative    OBJECTIVE:  /85   Pulse 60   Temp 97.5  F (36.4  C) (Oral)   Resp 16   Wt 77.1 kg (170 lb)   SpO2 98%   Breastfeeding? No   BMI 25.10 kg/m    GENERAL APPEARANCE: healthy, alert and moderate distress  EYES: conjunctiva clear  EARS:no cerumen.   Ear canals no erythema, TM's intact no erythema .    NOSE/MOUTH: Nose and mouth is normal, no erythema or lesions  THROAT: no erythema w/ no tonsillar enlargement . positive exudates  NECK: supple, nontender, no lymphadenopathy  RESP: lungs clear to auscultation - no rales, rhonchi or wheezes  CV: regular rates and rhythm, normal S1 S2, no murmur noted  NEURO: awake, alert        No results found for this or any previous visit (from the past 168 hour(s)).     ASSESSMENT:     ICD-10-CM    1. Seasonal allergic rhinitis, unspecified trigger J30.2 fluticasone (FLONASE) 50 MCG/ACT nasal spray         PLAN:    Seem to be sinus allergy, will give flonase nasal spray. Tylenol and ibuprofen prn for  pain  Lots of rest and fluids.  Follow up in 2-3 weeks if not better or sooner if getting worse .    Zuri Daniels MD

## 2019-03-11 NOTE — NURSING NOTE
"Chief Complaint   Patient presents with     Cough     pt c/o productive cough, sinus pressure, post nasal drainage and fatigue       Initial /85   Pulse 60   Temp 97.5  F (36.4  C) (Oral)   Resp 16   Wt 77.1 kg (170 lb)   SpO2 98%   Breastfeeding? No   BMI 25.10 kg/m   Estimated body mass index is 25.1 kg/m  as calculated from the following:    Height as of 1/1/19: 1.753 m (5' 9\").    Weight as of this encounter: 77.1 kg (170 lb).  Medication Reconciliation: complete  Bruce Alfaro MA    "

## 2019-03-11 NOTE — TELEPHONE ENCOUNTER
Pt states she has had allergies for years and they are getting worse. She is not sure what she is allergic to.  Pt states she has been seen several times thinking she has an infections but it is dx as allergies.  Pt states she feels like she is sick all the time.  Pt did make an appointment to get referral from Dr. Hidalgo on 3/13/19.  Pt did just see same day provider today.  Pt asking for referral without appointment with Dr. Hidalgo.  To provider to advise.  Wendy BAUMANN, RN

## 2019-03-11 NOTE — TELEPHONE ENCOUNTER
Reason for Call:  Other call back    Detailed comments: patient would like a referral to an allergist.      Phone Number Patient can be reached at: Cell number on file:    Telephone Information:   Mobile [phone]       Best Time: any time    Can we leave a detailed message on this number? YES    Call taken on 3/11/2019 at 12:45 PM by Vandana Mayer

## 2019-03-11 NOTE — TELEPHONE ENCOUNTER
Pt notified that referral is done.  Scheduling number given to pt. Ov not needed, so appointment with Dr. Hidalgo cancelled.  Wendy BAUMANN, RN

## 2019-03-18 ENCOUNTER — OFFICE VISIT (OUTPATIENT)
Dept: ALLERGY | Facility: CLINIC | Age: 59
End: 2019-03-18
Payer: COMMERCIAL

## 2019-03-18 VITALS
BODY MASS INDEX: 25.87 KG/M2 | SYSTOLIC BLOOD PRESSURE: 119 MMHG | WEIGHT: 175.2 LBS | HEART RATE: 57 BPM | OXYGEN SATURATION: 99 % | DIASTOLIC BLOOD PRESSURE: 82 MMHG

## 2019-03-18 DIAGNOSIS — T50.905A ADVERSE EFFECT OF DRUG, INITIAL ENCOUNTER: ICD-10-CM

## 2019-03-18 DIAGNOSIS — Z88.0 PENICILLIN ALLERGY: ICD-10-CM

## 2019-03-18 DIAGNOSIS — J31.0 NONALLERGIC RHINITIS: Primary | ICD-10-CM

## 2019-03-18 PROCEDURE — 95004 PERQ TESTS W/ALRGNC XTRCS: CPT | Performed by: ALLERGY & IMMUNOLOGY

## 2019-03-18 PROCEDURE — 99243 OFF/OP CNSLTJ NEW/EST LOW 30: CPT | Mod: 25 | Performed by: ALLERGY & IMMUNOLOGY

## 2019-03-18 RX ORDER — CETIRIZINE HYDROCHLORIDE 10 MG/1
TABLET ORAL
Refills: 0 | COMMUNITY
Start: 2019-01-01 | End: 2020-01-23

## 2019-03-18 NOTE — PROGRESS NOTES
"Dear Nav Parada MD,    Thank you for referring your patient Mary Irizarry to the Allergy/Immunology Clinic. Mary Irziarry was seen in the Allergy Clinic at [facility]. The following are my recommendations regarding her Nonallergic Rhinitis, Penicillin Allergy and Adverse Reaction to Drug    1. Use fluticasone nasal spray, 2 sprays in each nostril daily - appropriate nasal spray technique reviewed  2. Recommend use of sinus irrigation rinse daily as needed  3. Return for further testing and evaluation of penicillin allergy      Mary Irizarry is a 59 year old White female being seen today at the request of Dr. Parada in consultation for allergies. She reports that for the past 30 years she has had recurrent sinus symptoms that seem to be getting worse in the last few years. She recalls that her initial symptoms occurred after spending time in a steam room. Since then she has had a sinus infection every winter. In the last few years Mary feels she has become more sensitive and reacts to \"everything.\" When she presents for evaluation she is told her symptoms are due to allergies and not an infection. On a few occasions she has been treated for a sinus infection but her symptoms continue to return. She states that while her symptoms used to be sporadic she is now having symptoms throughout the year. Mary states that her symptoms start out with a headache and progress to her feeling hot and flushed and having a sore throat. She describes the throat discomfort as \"having glass in her throat.\" The following day she will develop sneezing, nasal congestion, post-nasal drainage, and cough. Her symptoms will last 2 to 6 weeks before they resolve. She denies having associated shortness of breath or chest tightness. When she coughs Mary will bring up clear or white sputum but when it turns into an infection the sputum is yellow. She takes herbal supplements to help prevent " her symptoms from turning into an infection. She feels that if she takes oregano oil her symptoms will be kept at bay. In the past Mary was prescribed an inhaler and a nasal spray but she did not feel that either were particularly helpful in treating her symptoms. Last January she was prescribed doxycycline for a sinus infection which was helpful however her sinus symptoms are starting to return.     Mary has a reported penicillin allergy. She states that at age 17 she was ill and her mother had leftover antibiotics in a medicine cabinet. She believes that she took penicillin and developed symptoms of jitteriness. She does not recall any other symptoms including rash, swelling, or difficulty breathing.      PAST MEDICAL HISTORY:  None    Family History   Problem Relation Age of Onset     Diabetes Father      Past Surgical History:   Procedure Laterality Date     ENT SURGERY      mouth     SOFT TISSUE SURGERY      remove needle from foot       ENVIRONMENTAL HISTORY: [place]. [place] is heated with a forced air and propane. They do have central air conditioning. The patient's bedroom is furnished with carpeting in bedroom.  Pets inside the house include 3 cat(s) and 2 dog(s). There is history of cockroach or mice infestation. There is/are 1 smoker that lives in the house, but does not smoke inside the home.  The house do have a damp basement.     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 She lives with her roomate.    REVIEW OF SYSTEMS:  General: negative for weight gain. negative for weight loss. positive  for changes in sleep.   Eyes: negative for itching. negative for redness. negative for tearing/watering. negative for vision changes  Ears: negative for fullness. negative for hearing loss. negative for dizziness.   Nose: negative for snoring.negative for changes in  smell. positive  for drainage.   Throat: positive  for hoarseness. positive  for sore throat. negative for trouble swallowing.   Lungs: positive  for cough. negative for shortness of breath.negative for wheezing. positive  for sputum production.   Cardiovascular: negative for chest pain. negative for swelling of ankles. negative for fast or irregular heartbeat.   Gastrointestinal: negative for nausea. negative for heartburn. negative for acid reflux.   Musculoskeletal: positive  for joint pain. positive  for joint stiffness. positive  for joint swelling.   Neurologic: negative for seizures. negative for fainting. negative for weakness.   Psychiatric: negative for changes in mood. negative for anxiety.   Endocrine: positive  for cold intolerance. negative for heat intolerance. negative for tremors.   Hematologic: negative for easy bruising. negative for easy bleeding.  Integumentary: negative for rash. negative for scaling. negative for nail changes.       Current Outpatient Medications:      acyclovir (ZOVIRAX) 400 MG tablet, TAKE 1 (ONE) TABLET BY MOUTH DAILY FOR HERPES SUPPRESSION, Disp: 90 tablet, Rfl: 3     fluticasone (FLONASE) 50 MCG/ACT nasal spray, Spray 1 spray into both nostrils 2 times daily, Disp: 16 g, Rfl: 0     cetirizine (ZYRTEC) 10 MG tablet, TAKE 1 TABLET (10 MG) BY MOUTH EVERY EVENING FOR 14 DAYS, Disp: , Rfl: 0     melatonin 5 MG CAPS, Take 5 mg by mouth, Disp: , Rfl:   Immunization History   Administered Date(s) Administered     HepA-Adult 08/23/2017, 03/15/2018     HepB-Adult 08/23/2017, 10/03/2017, 03/15/2018     Influenza Vaccine IM 3yrs+ 4 Valent IIV4 10/03/2017     TDAP Vaccine (Adacel) 08/15/2017     Allergies   Allergen Reactions     Penicillins Rash         EXAM:   /82 (BP Location: Left arm, Patient Position: Sitting, Cuff Size: Adult Regular)   Pulse 57   Wt 79.5 kg (175 lb 3.2 oz)   SpO2 99%   BMI 25.87 kg/m    GENERAL APPEARANCE: alert, cooperative and not in  distress  SKIN: no rashes, no lesions  HEAD: atraumatic, normocephalic  EYES: lids and lashes normal, conjunctivae and sclerae clear, pupils equal, round, reactive to light, EOM full and intact  ENT: no scars or lesions, nasal exam showed no discharge, swelling or lesions noted, otoscopy showed external auditory canals clear, tympanic membranes normal, tongue midline and normal, soft palate, uvula, and tonsils normal  NECK: no asymmetry, masses, or scars, supple without significant adenopathy  LUNGS: unlabored respirations, no intercostal retractions or accessory muscle use, clear to auscultation without rales or wheezes  HEART: regular rate and rhythm without murmurs and normal S1 and S2  MUSCULOSKELETAL: no musculoskeletal defects are noted  NEURO: no focal deficits noted  PSYCH: does not appear depressed or anxious    WORKUP: Skin testing    ENVIRONMENTAL PERCUTANEOUS SKIN TESTING: ADULT  [facility] 3/18/2019   Consent Y   Ordering Physician  Dr. Interiano   Interpreting Physician  Dr. Interiano   Testing Technician  Shantell Kaufman RN   Location Back   Time start: 11:40 AM   Time End: 11:55 AM   Positive Control: Histatrol*ALK 1 mg/ml 5/8   Negative Control: 50% Glycerin 0   Cat Hair*ALK (10,000 BAU/ml) 0   AP Dog Hair/Dander (1:100 w/v) 0   Dust Mite p. 30,000 AU/ml 0   Dust Mite f. (30,000 AU/ml) 0   Juan C (W/F in millimeters) 0   Ben Grass (100,000 BAU/mL) 0   Red Cedar (W/F in millimeters) 0   Maple/[place] (W/F in millimeters) 0   Hackberry (W/F in millimeters) 0   [place] (W/F in millimeters) 0   [place] *ALK (W/F in millimeters) 0   American Elm (W/F in millimeters) 0   [place] (W/F in millimeters) 0   Black [place] (W/F in millimeters) 0   Birch Mix (W/F in millimeters) 0   [place] (W/F in millimeters) 0   Oak (W/F in millimeters) 0   Cocklebur (W/F in millimeters) 0   [place] (W/F in millimeters) 0   White [place] (W/F in millimeters) 0   Careless (W/F in millimeters) 0   Nettle (W/F in  millimeters) 0   English Plantain (W/F in millimeters) 0   Kochia (W/F in millimeters) 0   Lamb's Quarter (W/F in millimeters) 0   Marshelder (W/F in millimeters) 0   Ragweed Mix* ALK (W/F in millimeters) 0   Russian Thistle (W/F in millimeters) 0   Sagebrush/Mugwort (W/F in millimeters) 0   Sheep Sorrel (W/F in millimeters) 0   Feather Mix* ALK (W/F in millimeters) 0   Penicillium Mix (1:10 w/v) 0   Curvularia spicifera (1:10 w/v) 0   Epicoccum (1:10 w/v) 0   Aspergillus fumigatus (1:10 w/v): 0   Alternaria tenius (1:10 w/v) 0   H. Cladosporium (1:10 w/v) 0   Phoma herbarum (1:10 w/v) 0        ASSESSMENT/PLAN:  Mary Irizarry is a 59 year old female here for evaluation of possible allergies. Skin testing was negative for evidence of aeroallergen sensitization. She reports recurrent rhinosinusitis symptoms that last 2 to 6 weeks. We discussed that these symptoms are likely due to recurrent URIs compounded by nonallergic rhinitis. Although she has used nasal steroid periodically for acute symptoms this has not been taken preventatively. She may benefit from regular use of nasal steroid to help prevent symptoms along with use of sinus irrigation rinses.    1. Use fluticasone nasal spray, 2 sprays in each nostril daily - appropriate nasal spray technique reviewed  2. Recommend use of sinus irrigation rinse daily as needed  3. Return for further testing and evaluation of penicillin allergy      Ekaterina Interinao MD  Allergy/Immunology  [place], [place]      Chart documentation done in part with Dragon Voice Recognition Software. Although reviewed after completion, some word and grammatical errors may remain.

## 2019-03-18 NOTE — NURSING NOTE
Per provider verbal order, placed Adult Environmental Panel scratch test.  Consent was obtained prior to procedure.  Once panels were placed, patient was monitored for 15 minutes in clinic.  Provider read test after 15 minutes..  Pt tolerated procedure well.  All questions and concerns were addressed at office visit.     Shantell Kaufman RN

## 2019-03-18 NOTE — PATIENT INSTRUCTIONS
If you have any questions regarding your allergies, asthma, or what we discussed during your visit today please call the allergy clinic or contact us via [employer].      Yasmin Almeida/[facility]: [phone]      Come back at 1PM on 3/25/19 for allergy testing. Do not take any allergy medication by mouth for the next week.    I recommend that you use 2 sprays of the flonase (fluticasone) nasal spray daily from November through February to help prevent your symptoms. You can also use a neti pot or sinus rinse daily as needed.      ENVIRONMENTAL PERCUTANEOUS SKIN TESTING: ADULT  [facility] 3/18/2019   Consent Y   Ordering Physician  Dr. Interiano   Interpreting Physician  Dr. Interiano   Testing Technician  Shantell Kaufman, RN   Location Back   Time start: 11:40 AM   Time End: 11:55 AM   Positive Control: Histatrol*ALK 1 mg/ml 5/8   Negative Control: 50% Glycerin 0   Cat Hair*ALK (10,000 BAU/ml) 0   AP Dog Hair/Dander (1:100 w/v) 0   Dust Mite p. 30,000 AU/ml 0   Dust Mite f. (30,000 AU/ml) 0   Juan C (W/F in millimeters) 0   Ben Grass (100,000 BAU/mL) 0   Red Cedar (W/F in millimeters) 0   Maple/[place] (W/F in millimeters) 0   Hackberry (W/F in millimeters) 0   [place] (W/F in millimeters) 0   [place] *ALK (W/F in millimeters) 0   American Elm (W/F in millimeters) 0   [place] (W/F in millimeters) 0   Black [place] (W/F in millimeters) 0   Birch Mix (W/F in millimeters) 0   [place] (W/F in millimeters) 0   Oak (W/F in millimeters) 0   Cocklebur (W/F in millimeters) 0   [place] (W/F in millimeters) 0   White [place] (W/F in millimeters) 0   Careless (W/F in millimeters) 0   Nettle (W/F in millimeters) 0   English Plantain (W/F in millimeters) 0   Kochia (W/F in millimeters) 0   Lamb's Quarter (W/F in millimeters) 0   Marshelder (W/F in millimeters) 0   Ragweed Mix* ALK (W/F in millimeters) 0   Russian Thistle (W/F in millimeters) 0   Sagebrush/Mugwort (W/F in millimeters) 0   Sheep Sorrel (W/F in  millimeters) 0   Feather Mix* ALK (W/F in millimeters) 0   Penicillium Mix (1:10 w/v) 0   Curvularia spicifera (1:10 w/v) 0   Epicoccum (1:10 w/v) 0   Aspergillus fumigatus (1:10 w/v): 0   Alternaria tenius (1:10 w/v) 0   H. Cladosporium (1:10 w/v) 0   Phoma herbarum (1:10 w/v) 0

## 2019-03-18 NOTE — LETTER
"    3/18/2019         RE: Mary Irizarry  [address] [place] [address]        Dear Colleague,    Thank you for referring your patient, Mary Irizarry, to the [facility]. Please see a copy of my visit note below.    Dear Nav Parada MD,    Thank you for referring your patient Mary Irizarry to the Allergy/Immunology Clinic. Mary Irizarry was seen in the Allergy Clinic at [facility]. The following are my recommendations regarding her Nonallergic Rhinitis, Penicillin Allergy and Adverse Reaction to Drug    1. Use fluticasone nasal spray, 2 sprays in each nostril daily - appropriate nasal spray technique reviewed  2. Recommend use of sinus irrigation rinse daily as needed  3. Return for further testing and evaluation of penicillin allergy      Mary Irizarry is a 59 year old White female being seen today at the request of Dr. Parada in consultation for allergies. She reports that for the past 30 years she has had recurrent sinus symptoms that seem to be getting worse in the last few years. She recalls that her initial symptoms occurred after spending time in a steam room. Since then she has had a sinus infection every winter. In the last few years Mary feels she has become more sensitive and reacts to \"everything.\" When she presents for evaluation she is told her symptoms are due to allergies and not an infection. On a few occasions she has been treated for a sinus infection but her symptoms continue to return. She states that while her symptoms used to be sporadic she is now having symptoms throughout the year. Mary states that her symptoms start out with a headache and progress to her feeling hot and flushed and having a sore throat. She describes the throat discomfort as \"having glass in her throat.\" The following day she will develop sneezing, nasal congestion, post-nasal drainage, and cough. Her symptoms will last 2 to 6 weeks before " they resolve. She denies having associated shortness of breath or chest tightness. When she coughs Mary will bring up clear or white sputum but when it turns into an infection the sputum is yellow. She takes herbal supplements to help prevent her symptoms from turning into an infection. She feels that if she takes oregano oil her symptoms will be kept at bay. In the past Mary was prescribed an inhaler and a nasal spray but she did not feel that either were particularly helpful in treating her symptoms. Last January she was prescribed doxycycline for a sinus infection which was helpful however her sinus symptoms are starting to return.     Mary has a reported penicillin allergy. She states that at age 17 she was ill and her mother had leftover antibiotics in a medicine cabinet. She believes that she took penicillin and developed symptoms of jitteriness. She does not recall any other symptoms including rash, swelling, or difficulty breathing.      PAST MEDICAL HISTORY:  None    Family History   Problem Relation Age of Onset     Diabetes Father      Past Surgical History:   Procedure Laterality Date     ENT SURGERY      mouth     SOFT TISSUE SURGERY      remove needle from foot       ENVIRONMENTAL HISTORY: [place] home is heated with a forced air and propane. They do have central air conditioning. The patient's bedroom is furnished with carpeting in bedroom.  Pets inside the house include 3 cat(s) and 2 dog(s). There is history of cockroach or mice infestation. There is/are 1 smoker that lives in the house, but does not smoke inside the home.  The house do have a damp basement.     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 She lives with her roomate.    REVIEW OF SYSTEMS:  General: negative for weight gain. negative for weight loss. positive  for changes in sleep.    Eyes: negative for itching. negative for redness. negative for tearing/watering. negative for vision changes  Ears: negative for fullness. negative for hearing loss. negative for dizziness.   Nose: negative for snoring.negative for changes in smell. positive  for drainage.   Throat: positive  for hoarseness. positive  for sore throat. negative for trouble swallowing.   Lungs: positive  for cough. negative for shortness of breath.negative for wheezing. positive  for sputum production.   Cardiovascular: negative for chest pain. negative for swelling of ankles. negative for fast or irregular heartbeat.   Gastrointestinal: negative for nausea. negative for heartburn. negative for acid reflux.   Musculoskeletal: positive  for joint pain. positive  for joint stiffness. positive  for joint swelling.   Neurologic: negative for seizures. negative for fainting. negative for weakness.   Psychiatric: negative for changes in mood. negative for anxiety.   Endocrine: positive  for cold intolerance. negative for heat intolerance. negative for tremors.   Hematologic: negative for easy bruising. negative for easy bleeding.  Integumentary: negative for rash. negative for scaling. negative for nail changes.       Current Outpatient Medications:      acyclovir (ZOVIRAX) 400 MG tablet, TAKE 1 (ONE) TABLET BY MOUTH DAILY FOR HERPES SUPPRESSION, Disp: 90 tablet, Rfl: 3     fluticasone (FLONASE) 50 MCG/ACT nasal spray, Spray 1 spray into both nostrils 2 times daily, Disp: 16 g, Rfl: 0     cetirizine (ZYRTEC) 10 MG tablet, TAKE 1 TABLET (10 MG) BY MOUTH EVERY EVENING FOR 14 DAYS, Disp: , Rfl: 0     melatonin 5 MG CAPS, Take 5 mg by mouth, Disp: , Rfl:   Immunization History   Administered Date(s) Administered     HepA-Adult 08/23/2017, 03/15/2018     HepB-Adult 08/23/2017, 10/03/2017, 03/15/2018     Influenza Vaccine IM 3yrs+ 4 Valent IIV4 10/03/2017     TDAP Vaccine (Adacel) 08/15/2017     Allergies   Allergen Reactions     Penicillins  Rash         EXAM:   /82 (BP Location: Left arm, Patient Position: Sitting, Cuff Size: Adult Regular)   Pulse 57   Wt 79.5 kg (175 lb 3.2 oz)   SpO2 99%   BMI 25.87 kg/m     GENERAL APPEARANCE: alert, cooperative and not in distress  SKIN: no rashes, no lesions  HEAD: atraumatic, normocephalic  EYES: lids and lashes normal, conjunctivae and sclerae clear, pupils equal, round, reactive to light, EOM full and intact  ENT: no scars or lesions, nasal exam showed no discharge, swelling or lesions noted, otoscopy showed external auditory canals clear, tympanic membranes normal, tongue midline and normal, soft palate, uvula, and tonsils normal  NECK: no asymmetry, masses, or scars, supple without significant adenopathy  LUNGS: unlabored respirations, no intercostal retractions or accessory muscle use, clear to auscultation without rales or wheezes  HEART: regular rate and rhythm without murmurs and normal S1 and S2  MUSCULOSKELETAL: no musculoskeletal defects are noted  NEURO: no focal deficits noted  PSYCH: does not appear depressed or anxious    WORKUP: Skin testing    ENVIRONMENTAL PERCUTANEOUS SKIN TESTING: ADULT  [facility] [facility] 3/18/2019   Consent Y   Ordering Physician  Dr. Interiano   Interpreting Physician  Dr. Interiano   Testing Technician  Shantell Kaufman, ADON[facility]   Location Back   Time start: 11:40 AM   Time End: 11:55 AM   Positive Control: Histatrol*ALK 1 mg/ml 5/8   Negative Control: 50% Glycerin 0   Cat Hair*ALK (10,000 BAU/ml) 0   AP Dog Hair/Dander (1:100 w/v) 0   Dust Mite p. 30,000 AU/ml 0   Dust Mite f. (30,000 AU/ml) 0   Juan C (W/F in millimeters) 0   Ben Grass (100,000 BAU/mL) 0   Red Cedar (W/F in millimeters) 0   Maple/[place] (W/F in millimeters) 0   Hackberry (W/F in millimeters) 0   [place] (W/F in millimeters) 0   [place] *ALK (W/F in millimeters) 0   American Elm (W/F in millimeters) 0   [place] (W/F in millimeters) 0   Black [place] (W/F in millimeters) 0   Birch Mix (W/F in  millimeters) 0   [place] (W/F in millimeters) 0   Oak (W/F in millimeters) 0   Cocklebur (W/F in millimeters) 0   [place] (W/F in millimeters) 0   White [place]ickson (W/F in millimeters) 0   Careless (W/F in millimeters) 0   Nettle (W/F in millimeters) 0   English Plantain (W/F in millimeters) 0   Kochia (W/F in millimeters) 0   Lamb's Quarter (W/F in millimeters) 0   Marshelder (W/F in millimeters) 0   Ragweed Mix* ALK (W/F in millimeters) 0   Russian Thistle (W/F in millimeters) 0   Sagebrush/Mugwort (W/F in millimeters) 0   Sheep Sorrel (W/F in millimeters) 0   Feather Mix* ALK (W/F in millimeters) 0   Penicillium Mix (1:10 w/v) 0   Curvularia spicifera (1:10 w/v) 0   Epicoccum (1:10 w/v) 0   Aspergillus fumigatus (1:10 w/v): 0   Alternaria tenius (1:10 w/v) 0   H. Cladosporium (1:10 w/v) 0   Phoma herbarum (1:10 w/v) 0        ASSESSMENT/PLAN:  Mary Irizarry is a 59 year old female here for evaluation of possible allergies. Skin testing was negative for evidence of aeroallergen sensitization. She reports recurrent rhinosinusitis symptoms that last 2 to 6 weeks. We discussed that these symptoms are likely due to recurrent URIs compounded by nonallergic rhinitis. Although she has used nasal steroid periodically for acute symptoms this has not been taken preventatively. She may benefit from regular use of nasal steroid to help prevent symptoms along with use of sinus irrigation rinses.    1. Use fluticasone nasal spray, 2 sprays in each nostril daily - appropriate nasal spray technique reviewed  2. Recommend use of sinus irrigation rinse daily as needed  3. Return for further testing and evaluation of penicillin allergy      Ekaterina Interiano MD  Allergy/Immunology  [facility] and [place], [place]      Chart documentation done in part with Dragon Voice Recognition Software. Although reviewed after completion, some word and grammatical errors may remain.    Again, thank you for allowing me to participate in the  care of your patient.        Sincerely,        Ekaterina Interiano MD

## 2019-04-02 ENCOUNTER — OFFICE VISIT (OUTPATIENT)
Dept: FAMILY MEDICINE | Facility: CLINIC | Age: 59
End: 2019-04-02
Payer: COMMERCIAL

## 2019-04-02 VITALS
DIASTOLIC BLOOD PRESSURE: 75 MMHG | RESPIRATION RATE: 18 BRPM | TEMPERATURE: 98.1 F | BODY MASS INDEX: 25.92 KG/M2 | OXYGEN SATURATION: 99 % | HEIGHT: 69 IN | WEIGHT: 175 LBS | HEART RATE: 63 BPM | SYSTOLIC BLOOD PRESSURE: 113 MMHG

## 2019-04-02 DIAGNOSIS — J32.9 CHRONIC SINUSITIS, UNSPECIFIED LOCATION: Primary | ICD-10-CM

## 2019-04-02 PROCEDURE — 99214 OFFICE O/P EST MOD 30 MIN: CPT | Performed by: FAMILY MEDICINE

## 2019-04-02 RX ORDER — LEVOFLOXACIN 500 MG/1
500 TABLET, FILM COATED ORAL DAILY
Qty: 14 TABLET | Refills: 0 | Status: SHIPPED | OUTPATIENT
Start: 2019-04-02 | End: 2019-05-15

## 2019-04-02 ASSESSMENT — MIFFLIN-ST. JEOR: SCORE: 1438.17

## 2019-04-02 NOTE — PROGRESS NOTES
"  SUBJECTIVE:   Mary Irizarry is a 59 year old female who presents to clinic today for the following health issues:        ENT Symptoms             Symptoms: cc Present Absent Comment   Fever/Chills   x    Fatigue  x     Muscle Aches   x    Eye Irritation  x     Sneezing   x    Nasal Jeison/Drg  x  Including nose bleeds   Sinus Pressure/Pain  x     Loss of smell   x    Dental pain   x    Sore Throat   x    Swollen Glands       Ear Pain/Fullness  x     Cough  x     Wheeze   x    Chest Pain   x    Shortness of breath   x    Rash   x    Other  x  headache     Symptom duration:  1 month    Symptom severity:     Treatments tried:  zyrtec and flonase   Contacts:  roommate      Was seen at [facility] 7 and 10 days ago  Given allergy meds initially and it did not work. This was zyrtec and flonase  Then given abx for 7 days. Tetracycline, felt partially better  Has seen an allergist and tests came back negative  Is using flonase daily   C/o pain over frontal and maxillary sinuses.  She thinks she may have a fungal infection    Problem list and histories reviewed & adjusted, as indicated.  Additional history: as documented    Labs reviewed in EPIC    Reviewed and updated as needed this visit by clinical staff       Reviewed and updated as needed this visit by Provider         ROS:  Constitutional, HEENT, cardiovascular, pulmonary, gi and gu systems are negative, except as otherwise noted.    OBJECTIVE:     /75   Pulse 63   Temp 98.1  F (36.7  C) (Oral)   Resp 18   Ht 1.753 m (5' 9\")   Wt 79.4 kg (175 lb)   SpO2 99%   BMI 25.84 kg/m    Body mass index is 25.84 kg/m .  GENERAL: healthy, alert and no distress  EYES: Eyes grossly normal to inspection, PERRL and conjunctivae and sclerae normal  HENT: ear canals and TM's normal, nose and mouth without ulcers or lesions  NECK: no adenopathy, no asymmetry, masses, or scars and thyroid normal to palpation  RESP: lungs clear to auscultation - no rales, rhonchi or " wheezes  CV: regular rate and rhythm, normal S1 S2, no S3 or S4, no murmur, click or rub, no peripheral edema and peripheral pulses strong    Diagnostic Test Results:  none     ASSESSMENT/PLAN:     1. Chronic sinusitis, unspecified location  Trial of abx for 2 weeks, if still not better consider sinus CT vs ENT  - levofloxacin (LEVAQUIN) 500 MG tablet; Take 1 tablet (500 mg) by mouth daily  Dispense: 14 tablet; Refill: 0  - OTOLARYNGOLOGY REFERRAL        Carol Hidalgo MD  [facility]

## 2019-04-15 ENCOUNTER — TELEPHONE (OUTPATIENT)
Dept: FAMILY MEDICINE | Facility: CLINIC | Age: 59
End: 2019-04-15

## 2019-04-15 NOTE — TELEPHONE ENCOUNTER
Returned call to patient.     She was seen 4/2/19 for sinusitis and treated with levaquin.  She reports that about 4 days into taking the medication, she wasn't able to lift her left arm. It was difficult to move the arm.   Two days later this also happened to her right arm.     She then saw her chiropractor because she thought she had a back issue and they told her that the levaquin can cause tendon problems and that is what is causing the arm issues. She has however continued to take the levaquin despite being told this. And she never contacted us for medical advice.     Patient has been unable to work and is worried about losing her job so she needs a note or documentation to state why.   This RN informed patient that an OV is required to discuss this so made an appointment to see PCP tomorrow.  Informed her to contact her employer to see what they need from PCP (forms filled out, letter, etc).  She will do this.     Patient states she has one more dose of the levaquin and wants to know if Dr Parada wants her to take it or skip it.  Dr Parada said do NOT take it.     Called patient and gave providers message/ instructions.  Patient states he understands this.     Sravani Capone RN, BSN

## 2019-04-15 NOTE — TELEPHONE ENCOUNTER
Patient states antibiotic you gave her had a side effect and she can't move her arms and now will need a work note so she does not get fired.  Please call.    Thank you.

## 2019-04-16 ENCOUNTER — OFFICE VISIT (OUTPATIENT)
Dept: FAMILY MEDICINE | Facility: CLINIC | Age: 59
End: 2019-04-16
Payer: COMMERCIAL

## 2019-04-16 VITALS
HEART RATE: 63 BPM | HEIGHT: 69 IN | WEIGHT: 176 LBS | TEMPERATURE: 98.8 F | SYSTOLIC BLOOD PRESSURE: 127 MMHG | BODY MASS INDEX: 26.07 KG/M2 | DIASTOLIC BLOOD PRESSURE: 75 MMHG

## 2019-04-16 DIAGNOSIS — T36.95XA ADVERSE REACTION TO ANTIBIOTIC, INITIAL ENCOUNTER: Primary | ICD-10-CM

## 2019-04-16 DIAGNOSIS — M77.9 TENDONITIS: ICD-10-CM

## 2019-04-16 PROCEDURE — 99213 OFFICE O/P EST LOW 20 MIN: CPT | Performed by: FAMILY MEDICINE

## 2019-04-16 RX ORDER — HYDROCODONE BITARTRATE AND ACETAMINOPHEN 5; 325 MG/1; MG/1
1 TABLET ORAL EVERY 6 HOURS PRN
Qty: 30 TABLET | Refills: 0 | Status: SHIPPED | OUTPATIENT
Start: 2019-04-16 | End: 2019-05-15

## 2019-04-16 ASSESSMENT — MIFFLIN-ST. JEOR: SCORE: 1442.71

## 2019-04-16 NOTE — PROGRESS NOTES
"  SUBJECTIVE:   Mary Irizarry is a 59 year old female who presents to clinic today for the following   health issues:        Possible reaction to antibiotic    Very painful to move arms and right leg    Pt recently on levaquin for sinus infection.  She stopped yesterday due to advice of nurse  Pt started with one shoulder hurting and she attributed to strain at work.  Then the other started hurting and then her right leg.  She saw chiropractor who noted she was on levaquin and recommend she contact us.  She did not take her last dose  She is able to move her arms and legs, but it is very painful. She has a very physically demanding job at work  Recommended no further work until her symptoms resolve.          Additional history: as documented    Reviewed  and updated as needed this visit by clinical staff  Tobacco  Allergies  Meds  Med Hx  Surg Hx  Fam Hx  Soc Hx        Reviewed and updated as needed this visit by Provider         Labs reviewed in EPIC    ROS:  Constitutional, HEENT, cardiovascular, pulmonary, gi and gu systems are negative, except as otherwise noted.    OBJECTIVE:     /75   Pulse 63   Temp 98.8  F (37.1  C) (Oral)   Ht 1.753 m (5' 9\")   Wt 79.8 kg (176 lb)   BMI 25.99 kg/m    Body mass index is 25.99 kg/m .  GENERAL: healthy, alert and no distress  MS: no gross musculoskeletal defects noted, no edema, very limited ROM in regards to abduction/flexion and extension of bilateral shoulders    Diagnostic Test Results:  none     ASSESSMENT/PLAN:     1. Adverse reaction to antibiotic, initial encounter  Pt has stopped levaquin. Pt to start anti-inflammatory, pt to limit activity til pain resolves. No work til pain resolves    2. Tendonitis  Use as needed, Follow-up as needed  - HYDROcodone-acetaminophen (NORCO) 5-325 MG tablet; Take 1 tablet by mouth every 6 hours as needed for pain  Dispense: 30 tablet; Refill: 0        Carol Hidalgo MD  [facility]        "

## 2019-05-14 NOTE — PROGRESS NOTES
"  SUBJECTIVE:   Mary Irizarry is a 59 year old female who presents to clinic today for the following   health issues:      Up date recent allergy.     Pt stoppe levaquin about one month ago due to significant shoulder pain bilateral, and post right leg pain mainly behing the knee. 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   She does not want to take any meds to treat the pain. The narcotics did not help  Will check inflammatory markers and ck to see if anything else is going on.     Additional history: as documented    Reviewed  and updated as needed this visit by clinical staff         Reviewed and updated as needed this visit by Provider         Labs reviewed in EPIC    ROS:  Constitutional, HEENT, cardiovascular, pulmonary, gi and gu systems are negative, except as otherwise noted.    OBJECTIVE:     /73   Pulse 76   Temp 98.1  F (36.7  C) (Oral)   Ht 1.753 m (5' 9\")   Wt 81.6 kg (180 lb)   BMI 26.58 kg/m    Body mass index is 26.58 kg/m .  GENERAL: healthy, alert and no distress  MS: no gross musculoskeletal defects noted, no edema    Diagnostic Test Results:  none     ASSESSMENT/PLAN:       1. Arthralgia, unspecified joint    - Erythrocyte sedimentation rate auto  - CRP inflammation  - CBC with platelets  - Comprehensive metabolic panel  - CK total    2. Adverse reaction to antibiotic, subsequent encounter  Await labs, referral to rheum?          Carol Hidalgo MD  [facility]        "

## 2019-05-15 ENCOUNTER — OFFICE VISIT (OUTPATIENT)
Dept: FAMILY MEDICINE | Facility: CLINIC | Age: 59
End: 2019-05-15
Payer: COMMERCIAL

## 2019-05-15 VITALS
TEMPERATURE: 98.1 F | WEIGHT: 180 LBS | SYSTOLIC BLOOD PRESSURE: 109 MMHG | BODY MASS INDEX: 26.66 KG/M2 | HEIGHT: 69 IN | HEART RATE: 76 BPM | DIASTOLIC BLOOD PRESSURE: 73 MMHG

## 2019-05-15 DIAGNOSIS — M25.50 ARTHRALGIA, UNSPECIFIED JOINT: Primary | ICD-10-CM

## 2019-05-15 DIAGNOSIS — T36.95XD: ICD-10-CM

## 2019-05-15 LAB
ERYTHROCYTE [DISTWIDTH] IN BLOOD BY AUTOMATED COUNT: 14.5 % (ref 10–15)
ERYTHROCYTE [SEDIMENTATION RATE] IN BLOOD BY WESTERGREN METHOD: 4 MM/H (ref 0–30)
HCT VFR BLD AUTO: 44.4 % (ref 35–47)
HGB BLD-MCNC: 14.4 G/DL (ref 11.7–15.7)
MCH RBC QN AUTO: 30.1 PG (ref 26.5–33)
MCHC RBC AUTO-ENTMCNC: 32.4 G/DL (ref 31.5–36.5)
MCV RBC AUTO: 93 FL (ref 78–100)
PLATELET # BLD AUTO: 237 10E9/L (ref 150–450)
RBC # BLD AUTO: 4.78 10E12/L (ref 3.8–5.2)
WBC # BLD AUTO: 7.2 10E9/L (ref 4–11)

## 2019-05-15 PROCEDURE — 82550 ASSAY OF CK (CPK): CPT | Performed by: FAMILY MEDICINE

## 2019-05-15 PROCEDURE — 85027 COMPLETE CBC AUTOMATED: CPT | Performed by: FAMILY MEDICINE

## 2019-05-15 PROCEDURE — 85652 RBC SED RATE AUTOMATED: CPT | Performed by: FAMILY MEDICINE

## 2019-05-15 PROCEDURE — 86140 C-REACTIVE PROTEIN: CPT | Performed by: FAMILY MEDICINE

## 2019-05-15 PROCEDURE — 36415 COLL VENOUS BLD VENIPUNCTURE: CPT | Performed by: FAMILY MEDICINE

## 2019-05-15 PROCEDURE — 99213 OFFICE O/P EST LOW 20 MIN: CPT | Performed by: FAMILY MEDICINE

## 2019-05-15 PROCEDURE — 80053 COMPREHEN METABOLIC PANEL: CPT | Performed by: FAMILY MEDICINE

## 2019-05-15 ASSESSMENT — MIFFLIN-ST. JEOR: SCORE: 1460.85

## 2019-05-16 LAB
ALBUMIN SERPL-MCNC: 4.2 G/DL (ref 3.4–5)
ALP SERPL-CCNC: 76 U/L (ref 40–150)
ALT SERPL W P-5'-P-CCNC: 26 U/L (ref 0–50)
ANION GAP SERPL CALCULATED.3IONS-SCNC: 3 MMOL/L (ref 3–14)
AST SERPL W P-5'-P-CCNC: 15 U/L (ref 0–45)
BILIRUB SERPL-MCNC: 0.2 MG/DL (ref 0.2–1.3)
BUN SERPL-MCNC: 20 MG/DL (ref 7–30)
CALCIUM SERPL-MCNC: 10.7 MG/DL (ref 8.5–10.1)
CHLORIDE SERPL-SCNC: 107 MMOL/L (ref 94–109)
CK SERPL-CCNC: 60 U/L (ref 30–225)
CO2 SERPL-SCNC: 30 MMOL/L (ref 20–32)
CREAT SERPL-MCNC: 0.83 MG/DL (ref 0.52–1.04)
CRP SERPL-MCNC: <2.9 MG/L (ref 0–8)
GFR SERPL CREATININE-BSD FRML MDRD: 78 ML/MIN/{1.73_M2}
GLUCOSE SERPL-MCNC: 90 MG/DL (ref 70–99)
POTASSIUM SERPL-SCNC: 4.7 MMOL/L (ref 3.4–5.3)
PROT SERPL-MCNC: 7 G/DL (ref 6.8–8.8)
SODIUM SERPL-SCNC: 140 MMOL/L (ref 133–144)

## 2019-06-04 ENCOUNTER — TELEPHONE (OUTPATIENT)
Dept: FAMILY MEDICINE | Facility: CLINIC | Age: 59
End: 2019-06-04

## 2019-06-04 DIAGNOSIS — M77.9 TENDONITIS: ICD-10-CM

## 2019-06-04 DIAGNOSIS — M25.50 ARTHRALGIA: Primary | ICD-10-CM

## 2019-06-04 DIAGNOSIS — T36.95XA ADVERSE REACTION TO ANTIBIOTIC: ICD-10-CM

## 2019-06-05 NOTE — TELEPHONE ENCOUNTER
Please call pt to see if her shoulders and leg is feeling any better. If not, would recommend follow-up with ortho just to make sure no tendon rupture from the antibiotic she was on or other concerning findings.     Carol Hidalgo MD

## 2019-06-05 NOTE — TELEPHONE ENCOUNTER
Patient called back.     Patient said that she has not noticed any improvement in symptoms.  She thinks she has gotten a little used to the pain because it seemed like it was more constant before than it is now but the pain is still there.   This RN recommended that she follow up with ortho per PCPs advice.   This RN signed ortho referral and gave patient phone number to call and make appointment.     Routing to PCP as MEDINA.    Sravani BAUMANN, RN

## 2019-06-05 NOTE — TELEPHONE ENCOUNTER
Left message on answering machine for patient to call back.  [facility] [phone]   Sandra Huerta RN

## 2019-06-06 ENCOUNTER — TELEPHONE (OUTPATIENT)
Dept: ORTHOPEDICS | Facility: CLINIC | Age: 59
End: 2019-06-06

## 2019-06-06 ENCOUNTER — MYC MEDICAL ADVICE (OUTPATIENT)
Dept: ORTHOPEDICS | Facility: CLINIC | Age: 59
End: 2019-06-06

## 2019-06-06 ENCOUNTER — OFFICE VISIT (OUTPATIENT)
Dept: ORTHOPEDICS | Facility: CLINIC | Age: 59
End: 2019-06-06
Payer: COMMERCIAL

## 2019-06-06 VITALS
HEART RATE: 63 BPM | DIASTOLIC BLOOD PRESSURE: 80 MMHG | SYSTOLIC BLOOD PRESSURE: 131 MMHG | TEMPERATURE: 97.4 F | OXYGEN SATURATION: 97 %

## 2019-06-06 DIAGNOSIS — T36.95XA ADVERSE REACTION TO ANTIBIOTIC, INITIAL ENCOUNTER: ICD-10-CM

## 2019-06-06 DIAGNOSIS — M13.0 POLYARTHRITIS: Primary | ICD-10-CM

## 2019-06-06 PROCEDURE — 86038 ANTINUCLEAR ANTIBODIES: CPT | Performed by: ORTHOPAEDIC SURGERY

## 2019-06-06 PROCEDURE — 36415 COLL VENOUS BLD VENIPUNCTURE: CPT | Performed by: ORTHOPAEDIC SURGERY

## 2019-06-06 PROCEDURE — 99203 OFFICE O/P NEW LOW 30 MIN: CPT | Performed by: ORTHOPAEDIC SURGERY

## 2019-06-06 PROCEDURE — 86431 RHEUMATOID FACTOR QUANT: CPT | Performed by: ORTHOPAEDIC SURGERY

## 2019-06-06 PROCEDURE — 86618 LYME DISEASE ANTIBODY: CPT | Performed by: ORTHOPAEDIC SURGERY

## 2019-06-06 ASSESSMENT — PAIN SCALES - GENERAL: PAINLEVEL: MODERATE PAIN (5)

## 2019-06-06 NOTE — TELEPHONE ENCOUNTER
N/A X2 P: VM message left with other [facility] Rheumatology providers in [facility]. We also sent a [employer] message with this information. Jacques Minaya OPA

## 2019-06-06 NOTE — LETTER
6/6/2019         RE: Mary Irizarry  [address] [facility] [address]        Dear Colleague,    Thank you for referring your patient, Mary Irizarry, to the [facility]. Please see a copy of my visit note below.    SUBJECTIVE:  Mary Irizarry is a 59 year old female who is seen in consultation at the request of Dr. Parada for bilateral shoulder pain that has been present approximately 2 months. Pain with taking Levaquin. She was taking the medication about 4/2/2019 for a sinus infection. She was doing well for 2 weeks, but then developed pain and difficulty lifting the left arm. A few days later, she lost strength in her right arm. Was told by her chiropractor that she may be having an adverse reaction to Levaquin and that she needed to see her primary care physician immediately. Today she has ongoing bilateral neck pain radiating into biceps. Also reports general body weakness and difficulty doing repetitive motions. Because of the significant amount of pain, she has modified her activity so it is no longer painful, but now feels muscle fatigue from inactivity. However, most of her pain is located in the joints. Also had some pain in the stomach. She has tried various topical treatments without improvements. Has tried Advil which helped, but only briefly.      Present symptoms: diffuse body pain  Associated symptoms: neck pain    Treatment up to this point: Advil.    Ortho PMH: none    Review of Systems:  Constitutional:  NEGATIVE for fever, chills, change in weight  Integumentary/Skin:  NEGATIVE for worrisome rashes, moles or lesions  Eyes:  NEGATIVE for vision changes or irritation  ENT/Mouth:  NEGATIVE for ear, mouth and throat problems  Resp:  NEGATIVE for significant cough or SOB  Breast:  NEGATIVE for masses, tenderness or discharge  CV:  NEGATIVE for chest pain, palpitations or peripheral edema  GI:  NEGATIVE for nausea, abdominal pain, heartburn, or change in bowel  habits  :  Negative   Musculoskeletal:  See HPI above  Neuro:  NEGATIVE for weakness, dizziness or paresthesias  Endocrine:  NEGATIVE for temperature intolerance, skin/hair changes  Heme/allergy/immune:  NEGATIVE for bleeding problems  Psychiatric:  NEGATIVE for changes in mood or affect    Past Medical History: History reviewed. No pertinent past medical history.  Past Surgical History:   Past Surgical History:   Procedure Laterality Date     ENT SURGERY      mouth     SOFT TISSUE SURGERY      remove needle from foot     Family History:   Family History   Problem Relation Age of Onset     Diabetes Father      Social History:   Social History     Tobacco Use     Smoking status: Former Smoker     Smokeless tobacco: Never Used   Substance Use Topics     Alcohol use: Yes     This document serves as a record of the services and decisions personally performed and made by CATRACHO Ramey MD. It was created on his behalf by Lor You, a trained medical scribe. The creation of this document is based the provider's statements to the medical scribe.    Scribjosé miguel You 4:58 PM 6/6/2019        OBJECTIVE:  Physical Exam:  /80 (BP Location: Right arm, Patient Position: Sitting, Cuff Size: Adult Regular)   Pulse 63   Temp 97.4  F (36.3  C)   SpO2 97%   General Appearance: healthy, alert and no distress   Skin: no suspicious lesions or rashes  Neuro: Normal strength and tone, mentation intact and speech normal  Vascular: good pulses, and capillary refill   Lymph: no lymphadenopathy   Psych:  mentation appears normal and affect normal/bright  Resp: no increased work of breathing    Neck Exam:  Cervical range of motion: full range of motion, causes some neck discomfort but nothing out of the ordinary for her.   Sensory deficits: none  Motor deficits: none  DTR's: normal    Bilateral Shoulder Exam:  Shoulder Inspection: no swelling, bruising, discoloration, or obvious deformity or asymmetry, no scapular  winging  Tender: diffuse, including periscapular muscles, trapezius   Range of Motion: moderate crepitations with range of motion    Active: forward flexion: full*, internal rotation: T7   Passive: forward flexion: full, external rotation; much less pain with range of motion with fully passive range of motion   Strength: forward flexion: 5*/5, external rotation: 5/5, Liftoff: Able  Impingement: 200    Wrist:  Mild swelling of the left wrist. Other joints of the hand seem normal without swelling.     Knee:  Good strength and range of motion  No defects in the hanstring tendon    LABS:  Results for orders placed or performed in visit on 05/15/19   Erythrocyte sedimentation rate auto   Result Value Ref Range    Sed Rate 4 0 - 30 mm/h   CRP inflammation   Result Value Ref Range    CRP Inflammation <2.9 0.0 - 8.0 mg/L   CBC with platelets   Result Value Ref Range    WBC 7.2 4.0 - 11.0 10e9/L    RBC Count 4.78 3.8 - 5.2 10e12/L    Hemoglobin 14.4 11.7 - 15.7 g/dL    Hematocrit 44.4 35.0 - 47.0 %    MCV 93 78 - 100 fl    MCH 30.1 26.5 - 33.0 pg    MCHC 32.4 31.5 - 36.5 g/dL    RDW 14.5 10.0 - 15.0 %    Platelet Count 237 150 - 450 10e9/L   Comprehensive metabolic panel   Result Value Ref Range    Sodium 140 133 - 144 mmol/L    Potassium 4.7 3.4 - 5.3 mmol/L    Chloride 107 94 - 109 mmol/L    Carbon Dioxide 30 20 - 32 mmol/L    Anion Gap 3 3 - 14 mmol/L    Glucose 90 70 - 99 mg/dL    Urea Nitrogen 20 7 - 30 mg/dL    Creatinine 0.83 0.52 - 1.04 mg/dL    GFR Estimate 78 >60 mL/min/[1.73_m2]    GFR Estimate If Black 90 >60 mL/min/[1.73_m2]    Calcium 10.7 (H) 8.5 - 10.1 mg/dL    Bilirubin Total 0.2 0.2 - 1.3 mg/dL    Albumin 4.2 3.4 - 5.0 g/dL    Protein Total 7.0 6.8 - 8.8 g/dL    Alkaline Phosphatase 76 40 - 150 U/L    ALT 26 0 - 50 U/L    AST 15 0 - 45 U/L   CK total   Result Value Ref Range    CK Total 60 30 - 225 U/L        ASSESSMENT:  1. Polyarthrlagias. 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 I offered prednisone for diffuse oral anti-inflammation, but she declines. Additional labs were done today. Will follow up with results. Offered physical therapy, however patient will hold off at this time. She will reconsider after she improves, so she can tolerate the physical therapy better.    Return to clinic: as needed.     The information in this document, created by a scribe for me, accurately reflects the services I personally performed and the decisions made by me. I have reviewed and approved this document for accuracy.      CATRACHO Ramey MD  Dept. Orthopedic Surgery  [facility]        Again, thank you for allowing me to participate in the care of your patient.        Sincerely,        Humble Ramey MD

## 2019-06-06 NOTE — PROGRESS NOTES
SUBJECTIVE:  Mary Irizarry is a 59 year old female who is seen in consultation at the request of Dr. Parada for bilateral shoulder pain that has been present approximately 2 months. Pain with taking Levaquin. She was taking the medication about 4/2/2019 for a sinus infection. She was doing well for 2 weeks, but then developed pain and difficulty lifting the left arm. A few days later, she lost strength in her right arm. Was told by her chiropractor that she may be having an adverse reaction to Levaquin and that she needed to see her primary care physician immediately. Today she has ongoing bilateral neck pain radiating into biceps. Also reports general body weakness and difficulty doing repetitive motions. Because of the significant amount of pain, she has modified her activity so it is no longer painful, but now feels muscle fatigue from inactivity. However, most of her pain is located in the joints. Also had some pain in the stomach. She has tried various topical treatments without improvements. Has tried Advil which helped, but only briefly.      Present symptoms: diffuse body pain  Associated symptoms: neck pain    Treatment up to this point: Advil.    Ortho PMH: none    Review of Systems:  Constitutional:  NEGATIVE for fever, chills, change in weight  Integumentary/Skin:  NEGATIVE for worrisome rashes, moles or lesions  Eyes:  NEGATIVE for vision changes or irritation  ENT/Mouth:  NEGATIVE for ear, mouth and throat problems  Resp:  NEGATIVE for significant cough or SOB  Breast:  NEGATIVE for masses, tenderness or discharge  CV:  NEGATIVE for chest pain, palpitations or peripheral edema  GI:  NEGATIVE for nausea, abdominal pain, heartburn, or change in bowel habits  :  Negative   Musculoskeletal:  See HPI above  Neuro:  NEGATIVE for weakness, dizziness or paresthesias  Endocrine:  NEGATIVE for temperature intolerance, skin/hair changes  Heme/allergy/immune:  NEGATIVE for bleeding problems  Psychiatric:   NEGATIVE for changes in mood or affect    Past Medical History: History reviewed. No pertinent past medical history.  Past Surgical History:   Past Surgical History:   Procedure Laterality Date     ENT SURGERY      mouth     SOFT TISSUE SURGERY      remove needle from foot     Family History:   Family History   Problem Relation Age of Onset     Diabetes Father      Social History:   Social History     Tobacco Use     Smoking status: Former Smoker     Smokeless tobacco: Never Used   Substance Use Topics     Alcohol use: Yes     This document serves as a record of the services and decisions personally performed and made by CATRACHO Ramey MD. It was created on his behalf by Lor You, a trained medical scribe. The creation of this document is based the provider's statements to the medical scribe.    Scribe Lor You 4:58 PM 6/6/2019        OBJECTIVE:  Physical Exam:  /80 (BP Location: Right arm, Patient Position: Sitting, Cuff Size: Adult Regular)   Pulse 63   Temp 97.4  F (36.3  C)   SpO2 97%   General Appearance: healthy, alert and no distress   Skin: no suspicious lesions or rashes  Neuro: Normal strength and tone, mentation intact and speech normal  Vascular: good pulses, and capillary refill   Lymph: no lymphadenopathy   Psych:  mentation appears normal and affect normal/bright  Resp: no increased work of breathing    Neck Exam:  Cervical range of motion: full range of motion, causes some neck discomfort but nothing out of the ordinary for her.   Sensory deficits: none  Motor deficits: none  DTR's: normal    Bilateral Shoulder Exam:  Shoulder Inspection: no swelling, bruising, discoloration, or obvious deformity or asymmetry, no scapular winging  Tender: diffuse, including periscapular muscles, trapezius   Range of Motion: moderate crepitations with range of motion    Active: forward flexion: full*, internal rotation: T7   Passive: forward flexion: full, external rotation; much less pain with  range of motion with fully passive range of motion   Strength: forward flexion: 5*/5, external rotation: 5/5, Liftoff: Able  Impingement: 200    Wrist:  Mild swelling of the left wrist. Other joints of the hand seem normal without swelling.     Knee:  Good strength and range of motion  No defects in the hanstring tendon    LABS:  Results for orders placed or performed in visit on 05/15/19   Erythrocyte sedimentation rate auto   Result Value Ref Range    Sed Rate 4 0 - 30 mm/h   CRP inflammation   Result Value Ref Range    CRP Inflammation <2.9 0.0 - 8.0 mg/L   CBC with platelets   Result Value Ref Range    WBC 7.2 4.0 - 11.0 10e9/L    RBC Count 4.78 3.8 - 5.2 10e12/L    Hemoglobin 14.4 11.7 - 15.7 g/dL    Hematocrit 44.4 35.0 - 47.0 %    MCV 93 78 - 100 fl    MCH 30.1 26.5 - 33.0 pg    MCHC 32.4 31.5 - 36.5 g/dL    RDW 14.5 10.0 - 15.0 %    Platelet Count 237 150 - 450 10e9/L   Comprehensive metabolic panel   Result Value Ref Range    Sodium 140 133 - 144 mmol/L    Potassium 4.7 3.4 - 5.3 mmol/L    Chloride 107 94 - 109 mmol/L    Carbon Dioxide 30 20 - 32 mmol/L    Anion Gap 3 3 - 14 mmol/L    Glucose 90 70 - 99 mg/dL    Urea Nitrogen 20 7 - 30 mg/dL    Creatinine 0.83 0.52 - 1.04 mg/dL    GFR Estimate 78 >60 mL/min/[1.73_m2]    GFR Estimate If Black 90 >60 mL/min/[1.73_m2]    Calcium 10.7 (H) 8.5 - 10.1 mg/dL    Bilirubin Total 0.2 0.2 - 1.3 mg/dL    Albumin 4.2 3.4 - 5.0 g/dL    Protein Total 7.0 6.8 - 8.8 g/dL    Alkaline Phosphatase 76 40 - 150 U/L    ALT 26 0 - 50 U/L    AST 15 0 - 45 U/L   CK total   Result Value Ref Range    CK Total 60 30 - 225 U/L        ASSESSMENT:  1. Polyarthrlagias. 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 I offered prednisone for diffuse oral anti-inflammation, but she declines. Additional labs were done today. Will follow up with results. Offered physical therapy,  however patient will hold off at this time. She will reconsider after she improves, so she can tolerate the physical therapy better.    Return to clinic: as needed.     The information in this document, created by a scribe for me, accurately reflects the services I personally performed and the decisions made by me. I have reviewed and approved this document for accuracy.      CATRACHO Ramey MD  Dept. Orthopedic Surgery  [facility]

## 2019-06-06 NOTE — TELEPHONE ENCOUNTER
Patient is unable to get in with the Rheumatologist she was referred to, please call to discuss other options.

## 2019-06-07 LAB
ANA SER QL IF: NEGATIVE
B BURGDOR IGG+IGM SER QL: 0.03 (ref 0–0.89)
RHEUMATOID FACT SER NEPH-ACNC: <20 IU/ML (ref 0–20)

## 2019-06-11 ENCOUNTER — OFFICE VISIT (OUTPATIENT)
Dept: FAMILY MEDICINE | Facility: CLINIC | Age: 59
End: 2019-06-11
Payer: COMMERCIAL

## 2019-06-11 VITALS
BODY MASS INDEX: 26.88 KG/M2 | SYSTOLIC BLOOD PRESSURE: 119 MMHG | HEART RATE: 57 BPM | TEMPERATURE: 97.5 F | WEIGHT: 182 LBS | DIASTOLIC BLOOD PRESSURE: 80 MMHG

## 2019-06-11 DIAGNOSIS — N39.0 URINARY TRACT INFECTION WITHOUT HEMATURIA, SITE UNSPECIFIED: Primary | ICD-10-CM

## 2019-06-11 DIAGNOSIS — R30.0 DYSURIA: ICD-10-CM

## 2019-06-11 LAB

## 2019-06-11 PROCEDURE — 87086 URINE CULTURE/COLONY COUNT: CPT | Performed by: PHYSICIAN ASSISTANT

## 2019-06-11 PROCEDURE — 81001 URINALYSIS AUTO W/SCOPE: CPT | Performed by: PHYSICIAN ASSISTANT

## 2019-06-11 PROCEDURE — 99213 OFFICE O/P EST LOW 20 MIN: CPT | Performed by: PHYSICIAN ASSISTANT

## 2019-06-11 PROCEDURE — 87186 SC STD MICRODIL/AGAR DIL: CPT | Performed by: PHYSICIAN ASSISTANT

## 2019-06-11 PROCEDURE — 87088 URINE BACTERIA CULTURE: CPT | Performed by: PHYSICIAN ASSISTANT

## 2019-06-11 RX ORDER — NITROFURANTOIN 25; 75 MG/1; MG/1
100 CAPSULE ORAL 2 TIMES DAILY
Qty: 14 CAPSULE | Refills: 0 | Status: SHIPPED | OUTPATIENT
Start: 2019-06-11 | End: 2019-06-18

## 2019-06-11 NOTE — PROGRESS NOTES
Subjective     Mary Irizarry is a 59 year old female who presents to clinic today for the following health issues:    HPI   URINARY TRACT SYMPTOMS      Duration: X 3 days    Description  dysuria, frequency, urgency and odor    Intensity:  moderate    Accompanying signs and symptoms:  Fever/chills: no   Flank pain no   Nausea and vomiting: no   Vaginal symptoms: none  Abdominal/Pelvic Pain: YES    History  History of frequent UTI's: no   History of kidney stones: no   Sexually Active: no concern of std  Possibility of pregnancy: No    Precipitating or alleviating factors: None    Therapies tried and outcome: none               Allergies   Allergen Reactions     Levaquin [Levofloxacin] Other (See Comments)     tendonitis     Penicillins Rash       No past medical history on file.      Current Outpatient Medications on File Prior to Visit:  acyclovir (ZOVIRAX) 400 MG tablet TAKE 1 (ONE) TABLET BY MOUTH DAILY FOR HERPES SUPPRESSION   cetirizine (ZYRTEC) 10 MG tablet TAKE 1 TABLET (10 MG) BY MOUTH EVERY EVENING FOR 14 DAYS   fluticasone (FLONASE) 50 MCG/ACT nasal spray Spray 1 spray into both nostrils 2 times daily (Patient not taking: Reported on 5/15/2019)     No current facility-administered medications on file prior to visit.     Social History     Tobacco Use     Smoking status: Former Smoker     Smokeless tobacco: Never Used   Substance Use Topics     Alcohol use: Yes     Drug use: No       ROS:  General: negative for fever  ABD: Denies abd pain  : as above    OBJECTIVE:  /80   Pulse 57   Temp 97.5  F (36.4  C) (Oral)   Wt 82.6 kg (182 lb)   BMI 26.88 kg/m     General:   awake, alert, and cooperative.  NAD.   Head: Normocephalic, atraumatic.  Eyes: Conjunctiva clear, non icteric.   ABD: soft, mild supropubic tenderness to palpation , no rigidity, guarding or rebound . No CVAT  Neuro: Alert and oriented - normal speech.   Results for orders placed or performed in visit on 06/11/19   *UA reflex to  Microscopic and Culture ([place] and [facility] (except [place] and [place])   Result Value Ref Range    Color Urine Yellow     Appearance Urine Slightly Cloudy     Glucose Urine Negative NEG^Negative mg/dL    Bilirubin Urine Negative NEG^Negative    Ketones Urine Negative NEG^Negative mg/dL    Specific Gravity Urine 1.025 1.003 - 1.035    Blood Urine Negative NEG^Negative    pH Urine 5.5 5.0 - 7.0 pH    Protein Albumin Urine Negative NEG^Negative mg/dL    Urobilinogen Urine 0.2 0.2 - 1.0 EU/dL    Nitrite Urine Negative NEG^Negative    Leukocyte Esterase Urine Moderate (A) NEG^Negative    Source Midstream Urine    Urine Microscopic   Result Value Ref Range    WBC Urine 10-25 (A) OTO5^0 - 5 /HPF    RBC Urine O - 2 OTO2^O - 2 /HPF    Squamous Epithelial /LPF Urine Few FEW^Few /LPF    Bacteria Urine Few (A) NEG^Negative /HPF       ASSESSMENT:      ICD-10-CM    1. Urinary tract infection without hematuria, site unspecified N39.0 nitroFURantoin macrocrystal-monohydrate (MACROBID) 100 MG capsule   2. Dysuria R30.0 *UA reflex to Microscopic and Culture ([place] and [facility] (except [place] and [place])     Urine Microscopic     Urine Culture Aerobic Bacterial           PLAN: Patient does not like going on antibiotics so she wants to wait for the urine culture before picking up the antibiotic.  If the urine culture does indeed grow bacteria and it is resistant to the Macrobid I would then contact the pharmacy to switch it from the Macrobid to an antibiotic it is sensitive to.  As per ordered above.  Drink plenty of fluids.  Prevention and treatment of UTI's discussed. Follow up with primary care physician if not improving.  Advised about symptoms which might herald more serious problems.      Vanda Lacey PA-C

## 2019-06-13 ENCOUNTER — RESULT FOLLOW UP (OUTPATIENT)
Dept: FAMILY MEDICINE | Facility: CLINIC | Age: 59
End: 2019-06-13

## 2019-06-13 RX ORDER — SULFAMETHOXAZOLE/TRIMETHOPRIM 800-160 MG
1 TABLET ORAL 2 TIMES DAILY
Qty: 14 TABLET | Refills: 0 | Status: SHIPPED | OUTPATIENT
Start: 2019-06-13 | End: 2019-06-20

## 2019-06-13 NOTE — RESULT ENCOUNTER NOTE
Left voicemail message requesting a call back to [facility] ED Lab Result RN at [phone].  RN is available every day between 10 a.m. and 6:30 p.m..

## 2019-06-13 NOTE — PROGRESS NOTES
I sent a prescription already to [facility] AND TOLD THEM TO PULL THE Macrobid. I sent a prescription for Bactrim. I left a message on patient's voicemail.  Vanda Lacey PA-C

## 2019-06-13 NOTE — RESULT ENCOUNTER NOTE
Rubia returned call and notified of Vanda Lacey PA-C, recommendation.  She is at the pharmacy and the pharmacist was unclear which antibiotic she should .  Confirmed that the medication to  is the Bactrim DS.

## 2019-06-14 LAB
BACTERIA SPEC CULT: ABNORMAL
SPECIMEN SOURCE: ABNORMAL

## 2019-07-09 ENCOUNTER — APPOINTMENT (OUTPATIENT)
Age: 59
Setting detail: DERMATOLOGY
End: 2019-07-12

## 2019-07-09 VITALS — WEIGHT: 180 LBS | HEIGHT: 70 IN | RESPIRATION RATE: 18 BRPM

## 2019-07-09 DIAGNOSIS — L82.0 INFLAMED SEBORRHEIC KERATOSIS: ICD-10-CM

## 2019-07-09 DIAGNOSIS — B35.3 TINEA PEDIS: ICD-10-CM

## 2019-07-09 DIAGNOSIS — L73.8 OTHER SPECIFIED FOLLICULAR DISORDERS: ICD-10-CM

## 2019-07-09 PROCEDURE — 99213 OFFICE O/P EST LOW 20 MIN: CPT | Mod: 25

## 2019-07-09 PROCEDURE — OTHER LIQUID NITROGEN: OTHER

## 2019-07-09 PROCEDURE — OTHER PRESCRIPTION: OTHER

## 2019-07-09 PROCEDURE — 17110 DESTRUCT B9 LESION 1-14: CPT

## 2019-07-09 PROCEDURE — OTHER COUNSELING: OTHER

## 2019-07-09 RX ORDER — TRETINOIN 0.25 MG/G
0.025% CREAM TOPICAL QD
Qty: 1 | Refills: 0 | Status: ERX

## 2019-07-09 RX ORDER — ECONAZOLE NITRATE 10 MG/G
1% CREAM TOPICAL BID
Qty: 2 | Refills: 1 | Status: ERX | COMMUNITY
Start: 2019-07-09

## 2019-07-09 ASSESSMENT — LOCATION DETAILED DESCRIPTION DERM
LOCATION DETAILED: RIGHT MEDIAL PLANTAR MIDFOOT
LOCATION DETAILED: LEFT CENTRAL PARIETAL SCALP
LOCATION DETAILED: POSTERIOR MID-PARIETAL SCALP
LOCATION DETAILED: LEFT FOREHEAD
LOCATION DETAILED: LEFT PLANTAR FOREFOOT OVERLYING 2ND METATARSAL
LOCATION DETAILED: RIGHT LATERAL DORSAL FOOT
LOCATION DETAILED: RIGHT SUPERIOR PARIETAL SCALP
LOCATION DETAILED: LEFT ANTERIOR PROXIMAL UPPER ARM
LOCATION DETAILED: LEFT LATERAL DORSAL FOOT

## 2019-07-09 ASSESSMENT — LOCATION SIMPLE DESCRIPTION DERM
LOCATION SIMPLE: LEFT PLANTAR SURFACE
LOCATION SIMPLE: POSTERIOR SCALP
LOCATION SIMPLE: LEFT FOOT
LOCATION SIMPLE: LEFT UPPER ARM
LOCATION SIMPLE: SCALP
LOCATION SIMPLE: RIGHT FOOT
LOCATION SIMPLE: RIGHT PLANTAR SURFACE
LOCATION SIMPLE: LEFT FOREHEAD

## 2019-07-09 ASSESSMENT — LOCATION ZONE DERM
LOCATION ZONE: FEET
LOCATION ZONE: SCALP
LOCATION ZONE: FACE
LOCATION ZONE: ARM

## 2019-07-09 NOTE — PROCEDURE: LIQUID NITROGEN
Consent: Verbal and written consent was obtained, and risks were reviewed prior to procedure today. Risks discussed include but are not limited to pain, crusting, scabbing, blistering, scarring, temporary or permanent darker or lighter pigmentary change, recurrence, incomplete resolution, and infection.
Render Note In Bullet Format When Appropriate: No
Detail Level: Detailed
Render Post-Care Instructions In Note?: yes
Post-Care Instructions: Avoid picking at any of the treated lesions. May cover with the a bandaid if blister unroofs or becomes a open wound.  Avoid going barefoot outside avoid any secondary infections.
Medical Necessity Clause: This procedure was medically necessary because the lesions that were treated were:
Medical Necessity Information: It is in your best interest to select a reason for this procedure from the list below. All of these items fulfill various CMS LCD requirements except the new and changing color options.

## 2019-07-09 NOTE — PROCEDURE: COUNSELING
Detail Level: Detailed
Patient Specific Counseling (Will Not Stick From Patient To Patient): Recommend an oral medication however pt strongly defers the oral medication, has been using the Ketoconazole 2% topical without success. Discussed that we can try something different for topical AF or try sending the Ketoconazole again to the pharmacy as likely the tube she has at home is . Will send econazole cream 1%.
Patient Specific Counseling (Will Not Stick From Patient To Patient): Offered option to incorporate a retinoid vs a consult to the med spa as they may implement a laser. Will try the topical retinoid

## 2019-07-25 ENCOUNTER — OFFICE VISIT (OUTPATIENT)
Dept: ENDOCRINOLOGY | Facility: CLINIC | Age: 59
End: 2019-07-25
Payer: COMMERCIAL

## 2019-07-25 VITALS
DIASTOLIC BLOOD PRESSURE: 82 MMHG | BODY MASS INDEX: 27.02 KG/M2 | HEART RATE: 76 BPM | OXYGEN SATURATION: 99 % | WEIGHT: 183 LBS | SYSTOLIC BLOOD PRESSURE: 110 MMHG

## 2019-07-25 DIAGNOSIS — M77.9 TENDONITIS: Primary | ICD-10-CM

## 2019-07-25 PROCEDURE — 99207 ZZC NO BILLABLE SERVICE THIS VISIT: CPT | Performed by: INTERNAL MEDICINE

## 2019-07-25 NOTE — LETTER
7/25/2019         RE: Mary Irizarry  [address] [facility] [address]        Dear Colleague,    Thank you for referring your patient, Mary Irizarry, to the [facility]. Please see a copy of my visit note below.    In discussion with the patient, she was referred to Rheumatology and not Endocrinology.   As her issue is pain related to antibiotic use which continues despite stopping the medication, I agree that Rheumatology would be more appropriate.     -I have asked the [facility] to place you on the list for Dr Velasquez if there is a cancellation.   -I would recommend calling [facility] for Dr Barb Diop or Mateo Rodriguez, [phone].   -I will ask the business office to refund your co-pay.     NO CHARGE.     Jesús Viramontes MD on 7/25/2019 at 9:33 AM      Again, thank you for allowing me to participate in the care of your patient.        Sincerely,        Jesús Viramontes MD

## 2019-07-25 NOTE — PROGRESS NOTES
In discussion with the patient, she was referred to Rheumatology and not Endocrinology.   As her issue is pain related to antibiotic use which continues despite stopping the medication, I agree that Rheumatology would be more appropriate.     -I have asked the [facility] to place you on the list for Dr Velasquez if there is a cancellation.   -I would recommend calling [facility] for Dr Barb Diop or Mateo Rodriguez, [phone].   -I will ask the business office to refund your co-pay.     NO CHARGE.     Jesús Viramontes MD on 7/25/2019 at 9:33 AM

## 2019-07-25 NOTE — PATIENT INSTRUCTIONS
-I have asked the [facility] to place you on the list for Dr Velasquez if there is a cancellation.   -I would recommend calling [facility] for Dr Barb Diop or Mateo Rodriguez, [phone].   -I will ask the business office to refund your co-pay.

## 2019-07-26 ENCOUNTER — TELEPHONE (OUTPATIENT)
Dept: FAMILY MEDICINE | Facility: CLINIC | Age: 59
End: 2019-07-26

## 2019-07-26 DIAGNOSIS — M25.50 POLYARTHRALGIA: Primary | ICD-10-CM

## 2019-07-26 RX ORDER — CYCLOBENZAPRINE HCL 10 MG
10 TABLET ORAL 3 TIMES DAILY PRN
Qty: 30 TABLET | Refills: 1 | Status: SHIPPED | OUTPATIENT
Start: 2019-07-26 | End: 2020-03-11

## 2019-07-26 NOTE — TELEPHONE ENCOUNTER
Pt notified of message below. RN reviewed common SE of this medication per [facility] Handbook 2017 and discussed not to combine with alcohol, or to drive while affected by this medication. Pt indicates understanding of issues and agrees with the plan.    MITUL BelleN, RN

## 2019-07-26 NOTE — TELEPHONE ENCOUNTER
Patient wants to know if Dr Parada will prescribe a muscle relaxer for her shoulders and arms. Ok to leave a detailed message.

## 2019-07-26 NOTE — TELEPHONE ENCOUNTER
RN returned call to pt.    Pt is requesting a prescription for a muscle relaxant for shoulder problems that she states are r/t a rxn to an antibiotic she had used previously.  RN advised medications are not routinely prescribed via telephone encounters and orders must generally be handled via Evisit or Office Visit.    Pt states her shoulders and neck are tight and use OTC ibuprofen upsets her stomach.  Pt states she was previously prescribed Norco and she does not want narcotics.  Pt states her PCP is aware of this condition and pt requests that her medication request for muscle relaxant be sent to provider to review.    MITUL BelleN, RN

## 2019-10-03 ENCOUNTER — HEALTH MAINTENANCE LETTER (OUTPATIENT)
Age: 59
End: 2019-10-03

## 2019-10-11 ENCOUNTER — APPOINTMENT (OUTPATIENT)
Age: 59
Setting detail: DERMATOLOGY
End: 2019-10-11

## 2019-10-11 DIAGNOSIS — Z41.9 ENCOUNTER FOR PROCEDURE FOR PURPOSES OTHER THAN REMEDYING HEALTH STATE, UNSPECIFIED: ICD-10-CM

## 2019-10-11 PROCEDURE — OTHER BOTOX: OTHER

## 2019-10-11 NOTE — PROCEDURE: BOTOX
Price (Use Numbers Only, No Special Characters Or $): 386 Price (Use Numbers Only, No Special Characters Or $): 545

## 2019-10-11 NOTE — PROCEDURE: BOTOX
Consent: New BOTOX & FILLER Consult  - with 1st time BOTOX   Performed Today. \\n\\nBdenia received Fraxel many years ago; she verbalized that she would never do it again.   Today she is here in hopes of discussing treatment options for her lower face, specifically her marionette area.   At the beginning of our consult Carissa expressed that she knows she would benefit from other treatments, but she pointed to her marionette area and said she just wants to focus on this area.    We discussed Botox to her JUSTICE and mentalis muscles and a small amount of Juvederm Ultra Plus to her marionette area to reduce the hyperactive muscle activity and to soften and hydrate the lines.   After our discussion Carissa asked if it would be possible to also soften her frown and forehead lines so that people don't think she looks angry anymore. \\n\\nPositive Hx: Cold Sores. \\n\\nNegative Hx / Denies: sensitivity/Allergies to: albumin, Gram + bacteria proteins, or anesthetics such as Lidocaine,   autoimmune conditions/disorders, neurological disorders,  immune compromised; hypersensitivity reaction; currently pregnant/breast feeding; recent h/o infections, oral or mucosal symptoms/injections, or dental procedures. \\n\\nTreatment areas reviewed with the patient while holding a hand held mirror.\\nDiscussed the anatomy and anatomical changes of the face associated with and due to the chronological aging process.\\nPatient had realistic expectations. \\n \\nPRE-EXISTING ASYMMETRIES & CONSIDERATIONS TO NOTE:  Skin Type III.   Deep lines - explained that they would soften, but not go away with Botox. \\n\\nDiscussed the mechanism of action for neuromodulators (Botox/Dysport), the anatomy involved, and possible side effects that include but are not limited to: bruising, swelling, flu-like symptoms, headache, nausea, redness, tingling.  Potential complications include, but are not limited to: allergic reaction, asymmetry, blurred or double vision, infection, loss of muscle tone, ptosis, muscle weakness. \\Laura questions answered, patient verbalized understanding and an informed consent was signed. \\n\\nTotal Units: 39\\nDilution: 1:1\\n\\nPatient informed that treatment to the following area(s) is considered \"off-label\" and non-FDA approved:  JUSTICE & mentalis. \\nVerbally instructed and reminded not to lie down for 4 hours and for the next 24 hours to limit physical activity to avoid rubbing/massaging the treated areas, against receiving a facial, massage or seeing a chiropractor and washing the face is okay, but to wash with fingertips (no Clarisonic brush, washcloth, etc...).\\nExplained to achieve optimal results and correction combo treatments and, or additional treatments/products (i.e. neurotoxin, dermal fillers, skin care, etc...) may be necessary following the completion of initial treatment recommendations and for maintenance. \\n\\nCONSIDER: Invited to our Nov 21st Event @ [employer] provided.     REC: Botox & Ultra Plus x 1 to  marionette lines. \\n\\nSKIN CARE: applied HA5 & Intellishade Orig.        \\n\\nRTC in 2 weeks if additional softening is needed. \\nRTC in 3-4 months to refresh Botox\\n\\nBrochures Given Today: Post care handout, Juvederm Filler, BD, [employer] Fly[employer] & token.  \\n\\nCOLOR KEY FOR DIAGRAM BELOW:\\nRED = 1/2 unit of Botox\\nORANGE = 1 unit of Botox\\nYELLOW = 2 units of Botox\\nGREEN = 3 units of Botox \\nBLUE = 4 units of Botox\\nBROWN = 5 units of Botox \\nBLACK = * Filler *

## 2019-10-13 ENCOUNTER — RX ONLY (RX ONLY)
Age: 59
End: 2019-10-13

## 2019-10-13 RX ORDER — TRETINOIN 0.25 MG/G
0.025% CREAM TOPICAL QD
Qty: 1 | Refills: 0 | Status: ERX

## 2019-10-25 ENCOUNTER — APPOINTMENT (OUTPATIENT)
Age: 59
Setting detail: DERMATOLOGY
End: 2019-10-25

## 2019-10-25 DIAGNOSIS — Z41.9 ENCOUNTER FOR PROCEDURE FOR PURPOSES OTHER THAN REMEDYING HEALTH STATE, UNSPECIFIED: ICD-10-CM

## 2019-10-25 PROCEDURE — OTHER COSMETIC FOLLOW-UP: OTHER

## 2019-10-25 NOTE — HPI: COSMETIC FOLLOW UP
How Did You Tolerate The Procedure?: other
What Condition Are We Treating?: treatment
What Procedure Did We Perform At The Last Visit?: Botox Cosmetic by Herlinda Castrejon, RN, C[facility] on 10/11/19
Never smoker

## 2019-10-25 NOTE — PROCEDURE: COSMETIC FOLLOW-UP
Price (Use Numbers Only, No Special Characters Or $): -No treatment necessary today.   -At next appt. move frontalis injections up a bit to maintain some motion in the brow when activates the muscle.   -Also spent some time discussing Voluma and JuveDerm Vollure around mouth area.  The patient will be considering treatment options and schedule accordingly. -Instructed to avoid NSAIDS, Vitamin E, Fish Oil, Gingko Balboa and alcohol for 5-7 days before filler treatment.
Detail Level: Detailed
Comments (Free Text): No treatment necessary today.  I compared before and after photos with patient.  She does have pre-existing hooding in upper eyelids, and R eyelid is greater than L in before photos.  There is not any additional treatment that I would recommend at this point, other than a tincture of time for the muscle mobility to return in frontalis muscle.  \\n\\nDo not treat frontalis in the future or if treated it must remain high and lower than previous dose.  Encouraged consultation for upper and lower bleph if that was important for patient. She reported that is not in her budget at this time. \\n\\nRecommended: 1 JuveDerm Ultra Plus and 1 Volbella for chilo oral area rejuvenation. She plans to call in to get event pricing.  \\n\\nInstructed to Avoid: NSAIDS, Vitamin E, fish oil, alcohol, ect. Handout provided on these instructions.

## 2019-11-12 ENCOUNTER — APPOINTMENT (OUTPATIENT)
Age: 59
Setting detail: DERMATOLOGY
End: 2019-11-12

## 2019-11-12 VITALS — HEIGHT: 70 IN | WEIGHT: 180 LBS | RESPIRATION RATE: 16 BRPM

## 2019-11-12 DIAGNOSIS — L57.0 ACTINIC KERATOSIS: ICD-10-CM

## 2019-11-12 DIAGNOSIS — L82.0 INFLAMED SEBORRHEIC KERATOSIS: ICD-10-CM

## 2019-11-12 DIAGNOSIS — L91.8 OTHER HYPERTROPHIC DISORDERS OF THE SKIN: ICD-10-CM

## 2019-11-12 DIAGNOSIS — L82.1 OTHER SEBORRHEIC KERATOSIS: ICD-10-CM

## 2019-11-12 PROCEDURE — 17003 DESTRUCT PREMALG LES 2-14: CPT | Mod: 59

## 2019-11-12 PROCEDURE — 17000 DESTRUCT PREMALG LESION: CPT | Mod: 59

## 2019-11-12 PROCEDURE — OTHER LIQUID NITROGEN: OTHER

## 2019-11-12 PROCEDURE — OTHER BENIGN DESTRUCTION COSMETIC: OTHER

## 2019-11-12 PROCEDURE — 99214 OFFICE O/P EST MOD 30 MIN: CPT | Mod: 25

## 2019-11-12 PROCEDURE — OTHER COUNSELING: OTHER

## 2019-11-12 PROCEDURE — 17110 DESTRUCT B9 LESION 1-14: CPT

## 2019-11-12 PROCEDURE — OTHER LIQUID NITROGEN (COSMETIC): OTHER

## 2019-11-12 ASSESSMENT — LOCATION DETAILED DESCRIPTION DERM
LOCATION DETAILED: RIGHT ANTERIOR MEDIAL PROXIMAL UPPER ARM
LOCATION DETAILED: LEFT AXILLARY VAULT
LOCATION DETAILED: RIGHT ANTERIOR SHOULDER
LOCATION DETAILED: LEFT BUTTOCK
LOCATION DETAILED: LEFT ANTERIOR MEDIAL PROXIMAL UPPER ARM
LOCATION DETAILED: RIGHT AXILLARY VAULT
LOCATION DETAILED: RIGHT INFERIOR LATERAL MALAR CHEEK
LOCATION DETAILED: RIGHT MEDIAL FOREHEAD
LOCATION DETAILED: RIGHT AXILLARY VAULT
LOCATION DETAILED: RIGHT POSTERIOR AXILLA
LOCATION DETAILED: LEFT CENTRAL ZYGOMA
LOCATION DETAILED: LEFT ANTERIOR SHOULDER
LOCATION DETAILED: LEFT AXILLARY VAULT

## 2019-11-12 ASSESSMENT — LOCATION SIMPLE DESCRIPTION DERM
LOCATION SIMPLE: RIGHT CHEEK
LOCATION SIMPLE: RIGHT AXILLARY VAULT
LOCATION SIMPLE: LEFT ZYGOMA
LOCATION SIMPLE: LEFT AXILLARY VAULT
LOCATION SIMPLE: RIGHT AXILLARY VAULT
LOCATION SIMPLE: LEFT SHOULDER
LOCATION SIMPLE: RIGHT UPPER ARM
LOCATION SIMPLE: RIGHT POSTERIOR AXILLA
LOCATION SIMPLE: RIGHT SHOULDER
LOCATION SIMPLE: LEFT AXILLARY VAULT
LOCATION SIMPLE: LEFT UPPER ARM
LOCATION SIMPLE: LEFT BUTTOCK
LOCATION SIMPLE: RIGHT FOREHEAD

## 2019-11-12 ASSESSMENT — LOCATION ZONE DERM
LOCATION ZONE: FACE
LOCATION ZONE: TRUNK
LOCATION ZONE: AXILLAE
LOCATION ZONE: ARM
LOCATION ZONE: AXILLAE
LOCATION ZONE: ARM

## 2019-11-12 NOTE — PROCEDURE: COUNSELING
Detail Level: Detailed
Patient Specific Counseling (Will Not Stick From Patient To Patient): Recommend she discontinue the 5%FU cream

## 2019-11-12 NOTE — PROCEDURE: LIQUID NITROGEN
Render Post-Care Instructions In Note?: yes
Duration Of Freeze Thaw-Cycle (Seconds): 0
Post-Care Instructions: - Patient was instructed to avoid picking at any of the treated lesions.\\n- Should any lesions treated today not be resolved within 4 weeks or if not certain, then return to clinic for re-evaluation.
Medical Necessity Clause: This procedure was medically necessary because the lesions that were treated were:
Add 52 Modifier (Optional): no
Consent: - Verbal and written consent was obtained, and risks were reviewed prior to procedure today. \\n- Risks discussed include but are not limited to pain, crusting, scabbing, blistering, scarring, temporary or permanent darker or lighter pigmentary change, recurrence, incomplete resolution, and infection.
Detail Level: Detailed
Consent: - Discussed that these are a result of cumulative sun exposure.\\n- Verbal and written consent was obtained, and risks were reviewed prior to procedure today. \\n- Risks discussed include but are not limited to pain, crusting, scabbing, blistering, scarring, temporary or permanent darker or lighter pigmentary change, recurrence, incomplete resolution, and infection.
Medical Necessity Information: It is in your best interest to select a reason for this procedure from the list below. All of these items fulfill various CMS LCD requirements except the new and changing color options.
Post-Care Instructions: - Avoid picking at any of the treated lesions.

## 2019-11-12 NOTE — PROCEDURE: LIQUID NITROGEN (COSMETIC)
Detail Level: Zone
Total Number Of Lesions Treated: 3
Duration Of Freeze Thaw-Cycle (Seconds): 0
Post-Care Instructions: I reviewed with the patient in detail post-care instructions. Patient is to wear sunprotection, and avoid picking at any of the treated lesions. Pt may apply Vaseline to crusted or scabbing areas.
Consent: The patient's consent was obtained including but not limited to risks of crusting, scabbing, blistering, scarring, darker or lighter pigmentary change, recurrence, incomplete removal and infection.
Render Post Care In The Note?: yes

## 2019-11-12 NOTE — HPI: SKIN LESION
What Type Of Note Output Would You Prefer (Optional)?: Bullet Format
How Severe Is Your Skin Lesion?: mild
Has Your Skin Lesion Been Treated?: not been treated
Is This A New Presentation, Or A Follow-Up?: Growth
Additional History: Patient used efudex roommate on area

## 2019-11-12 NOTE — PROCEDURE: BENIGN DESTRUCTION COSMETIC
Consent: The patient's consent was obtained including but not limited to risks of crusting, scabbing, blistering, scarring, darker or lighter pigmentary change, recurrence, incomplete removal and infection.
Post-Care Instructions: I reviewed with the patient in detail post-care instructions. Patient is to wear sunprotection, and avoid picking at any of the treated lesions. Pt may apply Vaseline to crusted or scabbing areas.
Price (Use Numbers Only, No Special Characters Or $): 100
Detail Level: Detailed

## 2019-11-26 ENCOUNTER — APPOINTMENT (OUTPATIENT)
Age: 59
Setting detail: DERMATOLOGY
End: 2019-11-27

## 2019-11-26 DIAGNOSIS — Z41.9 ENCOUNTER FOR PROCEDURE FOR PURPOSES OTHER THAN REMEDYING HEALTH STATE, UNSPECIFIED: ICD-10-CM

## 2019-11-26 PROCEDURE — OTHER JUVEDERM VOLBELLA INJECTION: OTHER

## 2019-11-26 PROCEDURE — OTHER JUVEDERM ULTRA PLUS XC INJECTION: OTHER

## 2019-11-26 NOTE — PROCEDURE: JUVEDERM ULTRA PLUS XC INJECTION
Price (Use Numbers Only, No Special Characters Or $): 456 Price (Use Numbers Only, No Special Characters Or $): 051

## 2019-11-26 NOTE — PROCEDURE: JUVEDERM VOLBELLA INJECTION
Consent: -Assessment \"Ayleen Aesthetics Scales\":\\nWrinkles: none-mild, Lines present at rest: none to mild, Folds: mild, Volume loss/skin laxity: mild.\\n-Treatment areas reviewed with patient holding a hand held mirror. The patient has realistic expectations.  \\n-Written consent obtained. \\n-Topical anesthesia was achieved with 23% lidocaine, 7% tetracaine. \\n-Questions answered. Patient verbalized understanding of the content.  See written informed consent on file. \\n-Topical anesthesia(lot#[id number]@3, exp 12/27/19) was removed. \\n-Listerine mouth rinse provided.\\n-The skin was prepped with with alcohol, Puracyn and chlorhexadine prep. \\n-The filler was administered to the treatment areas noted on diagram. \\n-A 31g BD syringe was used for precise micro droplet technique where needed\\n-Aseptic technique was maintained throughout procedure.\\n-The patient tolerated the procedure well w/o incident. \\n-Vaseline applied to lips\\n-Additional product (hyaluronic ) may be necessary for optimal correction following treatment and/or maintenance.\\n-Ice provided post treatment for 2 to 5 minutes and or to take home. -Patient instructed to apply ice 20 minutes on, 20 minutes off while awake for one to 1 to 2 days to reduce swelling. Avoid massage in the area. An over-the-counter antihistamine may be beneficial and was recommended to help with swelling. Patient instructed to notify clinic if any bruising worsens, travels or becomes painful.\\n-Calipers used\\n\\n-Recommended:\\n-Instructed to avoid: NSAIDs, Vitamin E, Fish oil, Ginkgo Biloba  and alcohol 5-7 days before filler treatment.\\n-Skin Care: Consent: -Assessment \"Ayleen Aesthetics Scales\":\\nWrinkles: none-mild, Lines present at rest: none to mild, Folds: mild, Volume loss/skin laxity: mild.\\n-Treatment areas reviewed with patient holding a hand held mirror. The patient has realistic expectations.  \\n-Written consent obtained. \\n-Topical anesthesia was achieved with 23% lidocaine, 7% tetracaine. \\n-Questions answered. Patient verbalized understanding of the content.  See written informed consent on file. \\n-Topical anesthesia(lot#[id number]@3, exp 12/27/19) was removed. \\n-Listerine mouth rinse provided.\\n-The skin was prepped with with alcohol, Puracyn and chlorhexadine prep. \\n-The filler was administered to the treatment areas noted on diagram. \\n-A 31g BD syringe was used for precise micro droplet technique where needed\\n-Aseptic technique was maintained throughout procedure.\\n-The patient tolerated the procedure well w/o incident. \\n-Vaseline applied to lips\\n-Additional product (hyaluronic ) may be necessary for optimal correction following treatment and/or maintenance.\\n-Ice provided post treatment for 2 to 5 minutes and or to take home. -Patient instructed to apply ice 20 minutes on, 20 minutes off while awake for one to 1 to 2 days to reduce swelling. Avoid massage in the area. An over-the-counter antihistamine may be beneficial and was recommended to help with swelling. Patient instructed to notify clinic if any bruising worsens, travels or becomes painful.\\n-Calipers used\\n\\n-Recommended:\\n-Instructed to avoid: NSAIDs, Vitamin E, Fish oil, Ginkgo Biloba  and alcohol 5-7 days before filler treatment.\\n-Skin Care:

## 2019-11-26 NOTE — PROCEDURE: JUVEDERM ULTRA PLUS XC INJECTION
Consent: -Assessment \"Ayleen Aesthetics Scales\":\\nWrinkles: none-mild, Lines present at rest: none to mild, Folds: mild, Volume loss/skin laxity: mild.\\nGlogau Wrinkle Scale= I  II III IV\\n-Treatment areas reviewed with patient holding a hand held mirror. The patient has realistic expectations.  \\n-Written consent obtained. Topical anesthesia was achieved with 23% lidocaine, 7% tetracaine. Questions answered. Patient verbalized understanding of the content.  See written informed consent on file. \\n-Topical anesthesia(lot#[id number], exp 12/27/19) was removed. \\n-Listerine mouth rinse provided.\\n-The skin was prepped with with alcohol and chlorhexadine prep. \\n-The filler was administered to the treatment areas noted. \\n-Aseptic technique was maintained throughout the procedure with Chlorhexadine and Puracyn.\\n-The patient tolerated the procedure well w/o incident. \\n-Additional product (hyaluronic ) may be necessary for optimal correction following treatment and/or maintenance. \\n-Ice provided post treatment for 2 to 5 minutes and/or to take home. -Patient instructed to apply ice 20 minutes on, 20 minutes off while awake for one to 1 to 2 days to reduce swelling. Avoid massage in the area. An over-the-counter antihistamine may be beneficial for the first 72 hours and was recommended to help with swelling.  Patient instructed to notify clinic if any bruising worsens, travels or becomes painful.\\n-Calipers used\\n-Recommended: Voluma along lateral jawline in the near future. \\n -Instructed to avoid NSAIDS, Vitamin E, Fish Oil, Ginkgo Biloba and alcohol 5-7 days before filler treatment.\\n-Skin Care:\\n-Pt instructed to take Valtrex as prescribed post treatment. Consent: -Assessment \"Ayleen Aesthetics Scales\":\\nWrinkles: none-mild, Lines present at rest: none to mild, Folds: mild, Volume loss/skin laxity: mild.\\nGlogau Wrinkle Scale= I  II III IV\\n-Treatment areas reviewed with patient holding a hand held mirror. The patient has realistic expectations.  \\n-Written consent obtained. Topical anesthesia was achieved with 23% lidocaine, 7% tetracaine. Questions answered. Patient verbalized understanding of the content.  See written informed consent on file. \\n-Topical anesthesia(lot#[id number]@3, exp 12/27/19) was removed. \\n-Listerine mouth rinse provided.\\n-The skin was prepped with with alcohol and chlorhexadine prep. \\n-The filler was administered to the treatment areas noted. \\n-Aseptic technique was maintained throughout the procedure with Chlorhexadine and Puracyn.\\n-The patient tolerated the procedure well w/o incident. \\n-Additional product (hyaluronic ) may be necessary for optimal correction following treatment and/or maintenance. \\n-Ice provided post treatment for 2 to 5 minutes and/or to take home. -Patient instructed to apply ice 20 minutes on, 20 minutes off while awake for one to 1 to 2 days to reduce swelling. Avoid massage in the area. An over-the-counter antihistamine may be beneficial for the first 72 hours and was recommended to help with swelling.  Patient instructed to notify clinic if any bruising worsens, travels or becomes painful.\\n-Calipers used\\n-Recommended: Voluma along lateral jawline in the near future. \\n -Instructed to avoid NSAIDS, Vitamin E, Fish Oil, Ginkgo Biloba and alcohol 5-7 days before filler treatment.\\n-Skin Care:\\n-Pt instructed to take Valtrex as prescribed post treatment.

## 2019-11-26 NOTE — PROCEDURE: JUVEDERM VOLBELLA INJECTION
Price (Use Numbers Only, No Special Characters Or $): 638 Price (Use Numbers Only, No Special Characters Or $): 985

## 2019-12-11 ENCOUNTER — APPOINTMENT (OUTPATIENT)
Age: 59
Setting detail: DERMATOLOGY
End: 2019-12-11

## 2019-12-11 DIAGNOSIS — Z41.9 ENCOUNTER FOR PROCEDURE FOR PURPOSES OTHER THAN REMEDYING HEALTH STATE, UNSPECIFIED: ICD-10-CM

## 2019-12-11 PROCEDURE — OTHER COSMETIC FOLLOW-UP: OTHER

## 2019-12-27 ENCOUNTER — OFFICE VISIT (OUTPATIENT)
Dept: FAMILY MEDICINE | Facility: CLINIC | Age: 59
End: 2019-12-27
Payer: COMMERCIAL

## 2019-12-27 ENCOUNTER — RX ONLY (RX ONLY)
Age: 59
End: 2019-12-27

## 2019-12-27 VITALS
TEMPERATURE: 97.6 F | HEIGHT: 69 IN | RESPIRATION RATE: 16 BRPM | OXYGEN SATURATION: 99 % | WEIGHT: 172.6 LBS | DIASTOLIC BLOOD PRESSURE: 80 MMHG | BODY MASS INDEX: 25.56 KG/M2 | SYSTOLIC BLOOD PRESSURE: 124 MMHG | HEART RATE: 62 BPM

## 2019-12-27 DIAGNOSIS — I51.7 ATRIAL ENLARGEMENT, LEFT: ICD-10-CM

## 2019-12-27 DIAGNOSIS — Z71.89 ADVANCED DIRECTIVES, COUNSELING/DISCUSSION: ICD-10-CM

## 2019-12-27 DIAGNOSIS — Z01.818 PREOP GENERAL PHYSICAL EXAM: Primary | ICD-10-CM

## 2019-12-27 DIAGNOSIS — I45.2 RBBB (RIGHT BUNDLE BRANCH BLOCK WITH LEFT ANTERIOR FASCICULAR BLOCK): ICD-10-CM

## 2019-12-27 PROCEDURE — 99214 OFFICE O/P EST MOD 30 MIN: CPT | Performed by: PHYSICIAN ASSISTANT

## 2019-12-27 PROCEDURE — 93000 ELECTROCARDIOGRAM COMPLETE: CPT | Performed by: PHYSICIAN ASSISTANT

## 2019-12-27 RX ORDER — TRETIONIN 0.25 MG/G
CREAM TOPICAL
Qty: 1 | Refills: 2 | Status: ERX | COMMUNITY
Start: 2019-12-27

## 2019-12-27 ASSESSMENT — MIFFLIN-ST. JEOR: SCORE: 1427.29

## 2019-12-27 NOTE — PROGRESS NOTES
[address] [address]  [phone]  Dept: [phone]    PRE-OP EVALUATION:  Today's date: 2019    Mary Irizarry (: 1960) presents for pre-operative evaluation assessment as requested by Dr. Wills.  She requires evaluation and anesthesia risk assessment prior to undergoing surgery/procedure for treatment of sagging eyelids.    Fax number for surgical facility:   Primary Physician: Carol Parada  Type of Anesthesia Anticipated: to be determined    Patient has a Health Care Directive or Living Will:  NO    Preop Questions 2019   Date of Surgery/Procedure: 2019   Facility or Hospital where procedure/surgery will be performed: [facility] [place]   1.  Do you have a history of Heart attack, stroke, stent, coronary bypass surgery, or other heart surgery? No   2.  Do you ever have any pain or discomfort in your chest? No   3.  Do you have a history of  Heart Failure? No   4.   Are you troubled by shortness of breath when:  walking on a level surface, or up a slight hill, or at night? No   5.  Do you currently have a cold, bronchitis or other respiratory infection? No   6.  Do you have a cough, shortness of breath, or wheezing? No   7.  Do you sometimes get pains in the calves of your legs when you walk? No   8. Do you or anyone in your family have previous history of blood clots? UNKNOWN -    9.  Do you or does anyone in your family have a serious bleeding problem such as prolonged bleeding following surgeries or cuts? UNKNOWN -    10. Have you ever had problems with anemia or been told to take iron pills? No   11. Have you had any abnormal blood loss such as black, tarry or bloody stools, or abnormal vaginal bleeding? No   12. Have you ever had a blood transfusion? No   13. Have you or any of your relatives ever had problems with anesthesia? UNKNOWN -    14. Do you have sleep apnea, excessive snoring or daytime drowsiness?  UNKNOWN -    15. Do you have any prosthetic heart valves? No   16. Do you have prosthetic joints? No   17. Is there any chance that you may be pregnant? No     Patient unsure of family history but has no personal experience of the above.     HPI:     HPI related to upcoming procedure: Sagging eyelids > 1 year.       See problem list for active medical problems.  Problems all longstanding and stable, except as noted/documented.  See ROS for pertinent symptoms related to these conditions.      MEDICAL HISTORY:     Patient Active Problem List    Diagnosis Date Noted     Raynaud's syndrome 12/11/2018     Priority: Medium     Herpes simplex virus infection 12/11/2018     Priority: Medium      History reviewed. No pertinent past medical history.  Past Surgical History:   Procedure Laterality Date     ENT SURGERY      mouth     SOFT TISSUE SURGERY      remove needle from foot     Current Outpatient Medications   Medication Sig Dispense Refill     acyclovir (ZOVIRAX) 400 MG tablet TAKE 1 (ONE) TABLET BY MOUTH DAILY FOR HERPES SUPPRESSION 90 tablet 3     cetirizine (ZYRTEC) 10 MG tablet TAKE 1 TABLET (10 MG) BY MOUTH EVERY EVENING FOR 14 DAYS  0     cyclobenzaprine (FLEXERIL) 10 MG tablet Take 1 tablet (10 mg) by mouth 3 times daily as needed for muscle spasms (Patient not taking: Reported on 12/27/2019) 30 tablet 1     fluticasone (FLONASE) 50 MCG/ACT nasal spray Spray 1 spray into both nostrils 2 times daily (Patient not taking: Reported on 7/25/2019) 16 g 0     OTC products: None, except as noted above    Allergies   Allergen Reactions     Levaquin [Levofloxacin] Other (See Comments)     tendonitis     Penicillins Rash      Latex Allergy: NO    Social History     Tobacco Use     Smoking status: Former Smoker     Smokeless tobacco: Never Used   Substance Use Topics     Alcohol use: Yes     History   Drug Use No       REVIEW OF SYSTEMS:   CONSTITUTIONAL: NEGATIVE for fever, chills, change in weight  ENT/MOUTH: NEGATIVE for  "ear, mouth and throat problems  RESP: NEGATIVE for significant cough or SOB  CV: NEGATIVE for chest pain, palpitations or peripheral edema    EXAM:   /80   Pulse 62   Temp 97.6  F (36.4  C) (Oral)   Resp 16   Ht 1.753 m (5' 9\")   Wt 78.3 kg (172 lb 9.6 oz)   SpO2 99%   BMI 25.49 kg/m    GENERAL APPEARANCE: healthy, alert and no distress  HENT: ear canals and TM's normal and nose and mouth without ulcers or lesions  RESP: lungs clear to auscultation - no rales, rhonchi or wheezes  CV: regular rate and rhythm, normal S1 S2, no S3 or S4 and no murmur, click or rub   ABDOMEN: soft, nontender, no HSM or masses and bowel sounds normal  NEURO: Normal strength and tone, sensory exam grossly normal, mentation intact and speech normal    DIAGNOSTICS:   EKG: appears normal, NSR, normal axis, normal intervals, no acute ST/T changes c/w ischemia, Right Bundle Branch Block, unchanged from previous tracings, Atrial hypertrophy    Recent Labs   Lab Test 05/15/19  1823   HGB 14.4         POTASSIUM 4.7   CR 0.83        IMPRESSION:   The proposed surgical procedure is considered LOW risk.    REVISED CARDIAC RISK INDEX  The patient has the following serious cardiovascular risks for perioperative complications such as (MI, PE, VFib and 3  AV Block):  No serious cardiac risks  INTERPRETATION: 0 risks: Class I (very low risk - 0.4% complication rate)    The patient has the following additional risks for perioperative complications:  No identified additional risks      ICD-10-CM    1. Preop general physical exam Z01.818        RECOMMENDATIONS:     APPROVAL GIVEN to proceed with proposed procedure, without further diagnostic evaluation       Signed Electronically by: José Galan PA-C    Copy of this evaluation report is provided to requesting physician.    [facility] Preop Guidelines    Revised Cardiac Risk Index  "

## 2020-01-23 ENCOUNTER — TELEPHONE (OUTPATIENT)
Dept: FAMILY MEDICINE | Facility: CLINIC | Age: 60
End: 2020-01-23

## 2020-01-23 DIAGNOSIS — J30.2 SEASONAL ALLERGIC RHINITIS, UNSPECIFIED TRIGGER: Primary | ICD-10-CM

## 2020-01-23 RX ORDER — CETIRIZINE HYDROCHLORIDE 10 MG/1
TABLET ORAL
Qty: 30 TABLET | Refills: 0 | Status: SHIPPED | OUTPATIENT
Start: 2020-01-23 | End: 2021-05-16

## 2020-01-23 NOTE — TELEPHONE ENCOUNTER
Reason for Call:  Medication or medication refill:    Do you use a [facility]?  Name of the pharmacy and phone number for the current request:  [id number] - [place], [place] - [address] AT [facility]  [phone]    Name of the medication requested: cetirizine (ZYRTEC) 10 MG tablet    Other request:     Can we leave a detailed message on this number? YES    Phone number patient can be reached at: Home number on file [phone] (home)    Best Time:     Call taken on 1/23/2020 at 2:25 PM by Sudha Donnelly

## 2020-01-23 NOTE — TELEPHONE ENCOUNTER
Routing refill request to provider for review/approval because:  Medication is reported/historical.  Patient was ordered by urgent care take this 1/1/19 for seasonal allergic rhinitis.    Routing to provider to advise.  Sravani Capone BSN, RN

## 2020-01-23 NOTE — LETTER
January 23, 2020    Mary Irizarry  [address] [facility] [address]    Dear Mary,       We recently received a refill request for Cetirizine (ZYRTEC) 10 MG tablet.  We have refilled this for a one time 30 day supply only because you are due for a:    Physical office visit and Fasting lab appointment      Please schedule this lab appointment 4-5 days prior to the office visit.     Please call at your earliest convenience so that there will not be a delay with your future refills.          Thank you,   Your [facility] Team/mkr  [phone]

## 2020-01-23 NOTE — TELEPHONE ENCOUNTER
Will give 1 month, overdue for preventive exam and multiple HM .  Needs appt with pcp prior to further refills

## 2020-02-07 ENCOUNTER — APPOINTMENT (OUTPATIENT)
Age: 60
Setting detail: DERMATOLOGY
End: 2020-02-07

## 2020-02-07 DIAGNOSIS — Z41.9 ENCOUNTER FOR PROCEDURE FOR PURPOSES OTHER THAN REMEDYING HEALTH STATE, UNSPECIFIED: ICD-10-CM

## 2020-02-07 PROCEDURE — OTHER BOTOX: OTHER

## 2020-02-07 NOTE — PROCEDURE: BOTOX
Consent: Assessment :\\n“Ayleen Aesthetics Scales”\\n-Wrinkles= mild, Lines present at rest= mild-fine, Folds= mild, Volume Loss & Skin Laxity= mild.  \\n-Glogau Wrinkle Scale:  II-III\\n-Pt has naturally occurring heaviness in L brow compared to R.\\n\\n-Treatment areas reviewed with the patient while holding a hand-held mirror.  \\n-The patient has realistic expectations. \\n\\n-Informed patient that lower face Botox injections are considered off-label.\\n\\n-Written consent obtained. Patient verbalized understanding. Questions answered. See written consent on file. \\n\\n-Topical anesthesia  L23%T7% was applied for 10 minutes on upper lip;  Lot #[id number], exp 9/12/19 \\n-The skin was prepped with alcohol, skin markings made and injections provided. \\n-The treatment was administered to the area(s) noted.\\n-The patient tolerated the procedure well w/o incident.  \\n-Additional product (Botox) may be necessary for optimal correction following treatment and/or maintenance.\\n-Patient instructed to not lie down for 4-6 hours.  Patient instructed not to massage or manipulate treatment areas (avoid facial treatments or Clarisonic Brush) and limit physical activity for 24 hours.\\n\\n-Calipers used\\n\\n-Recommended:  Voluma temples, cheeks, chin and lateral jawline for structure.\\n-Instructed to avoid: NSAIDs, Vitamin E, Fish oil, Ginkgo Biloba  and alcohol 5-7 days before filler treatment\\n-Skin Care: Obagi hydroquinone, tretinoin Consent: Assessment :\\n“Ayleen Aesthetics Scales”\\n-Wrinkles= mild, Lines present at rest= mild-fine, Folds= mild, Volume Loss & Skin Laxity= mild.  \\n-Glogau Wrinkle Scale:  II-III\\n-Pt has naturally occurring heaviness in L brow compared to R.\\n\\n-Treatment areas reviewed with the patient while holding a hand-held mirror.  \\n-The patient has realistic expectations. \\n\\n-Informed patient that lower face Botox injections are considered off-label.\\n\\n-Written consent obtained. Patient verbalized understanding. Questions answered. See written consent on file. \\n\\n-Topical anesthesia  L23%T7% was applied for 10 minutes on upper lip;  Lot #[id number], exp 9/12/19 \\n-The skin was prepped with alcohol, skin markings made and injections provided. \\n-The treatment was administered to the area(s) noted.\\n-The patient tolerated the procedure well w/o incident.  \\n-Additional product (Botox) may be necessary for optimal correction following treatment and/or maintenance.\\n-Patient instructed to not lie down for 4-6 hours.  Patient instructed not to massage or manipulate treatment areas (avoid facial treatments or Clarisonic Brush) and limit physical activity for 24 hours.\\n\\n-Calipers used\\n\\n-Recommended:  Voluma temples, cheeks, chin and lateral jawline for structure.\\n-Instructed to avoid: NSAIDs, Vitamin E, Fish oil, Ginkgo Biloba  and alcohol 5-7 days before filler treatment\\n-Skin Care: Obagi hydroquinone, tretinoin

## 2020-03-11 ENCOUNTER — OFFICE VISIT (OUTPATIENT)
Dept: FAMILY MEDICINE | Facility: CLINIC | Age: 60
End: 2020-03-11
Payer: COMMERCIAL

## 2020-03-11 VITALS
BODY MASS INDEX: 26.22 KG/M2 | DIASTOLIC BLOOD PRESSURE: 79 MMHG | HEART RATE: 57 BPM | HEIGHT: 69 IN | TEMPERATURE: 98.6 F | WEIGHT: 177 LBS | SYSTOLIC BLOOD PRESSURE: 115 MMHG

## 2020-03-11 DIAGNOSIS — J30.2 SEASONAL ALLERGIES: ICD-10-CM

## 2020-03-11 DIAGNOSIS — B35.3 TINEA PEDIS OF BOTH FEET: ICD-10-CM

## 2020-03-11 DIAGNOSIS — G47.00 INSOMNIA, UNSPECIFIED TYPE: ICD-10-CM

## 2020-03-11 DIAGNOSIS — Z12.31 ENCOUNTER FOR SCREENING MAMMOGRAM FOR BREAST CANCER: ICD-10-CM

## 2020-03-11 DIAGNOSIS — Z13.1 SCREENING FOR DIABETES MELLITUS: ICD-10-CM

## 2020-03-11 DIAGNOSIS — B00.9 HERPES SIMPLEX VIRUS INFECTION: ICD-10-CM

## 2020-03-11 DIAGNOSIS — Z00.00 ROUTINE HISTORY AND PHYSICAL EXAMINATION OF ADULT: Primary | ICD-10-CM

## 2020-03-11 DIAGNOSIS — Z12.4 SCREENING FOR MALIGNANT NEOPLASM OF CERVIX: ICD-10-CM

## 2020-03-11 DIAGNOSIS — Z13.220 SCREENING CHOLESTEROL LEVEL: ICD-10-CM

## 2020-03-11 PROCEDURE — G0476 HPV COMBO ASSAY CA SCREEN: HCPCS | Performed by: FAMILY MEDICINE

## 2020-03-11 PROCEDURE — 80061 LIPID PANEL: CPT | Performed by: FAMILY MEDICINE

## 2020-03-11 PROCEDURE — 99396 PREV VISIT EST AGE 40-64: CPT | Performed by: FAMILY MEDICINE

## 2020-03-11 PROCEDURE — 80048 BASIC METABOLIC PNL TOTAL CA: CPT | Performed by: FAMILY MEDICINE

## 2020-03-11 PROCEDURE — 36415 COLL VENOUS BLD VENIPUNCTURE: CPT | Performed by: FAMILY MEDICINE

## 2020-03-11 PROCEDURE — G0123 SCREEN CERV/VAG THIN LAYER: HCPCS | Performed by: FAMILY MEDICINE

## 2020-03-11 PROCEDURE — 99213 OFFICE O/P EST LOW 20 MIN: CPT | Mod: 25 | Performed by: FAMILY MEDICINE

## 2020-03-11 RX ORDER — ZOLPIDEM TARTRATE 5 MG/1
5 TABLET ORAL
Qty: 10 TABLET | Refills: 0 | Status: SHIPPED | OUTPATIENT
Start: 2020-03-11 | End: 2021-05-16

## 2020-03-11 RX ORDER — CETIRIZINE HYDROCHLORIDE 10 MG/1
10 TABLET ORAL DAILY
Qty: 90 TABLET | Refills: 3 | Status: SHIPPED | OUTPATIENT
Start: 2020-03-11 | End: 2021-03-19

## 2020-03-11 RX ORDER — ACYCLOVIR 400 MG/1
TABLET ORAL
Qty: 90 TABLET | Refills: 3 | Status: SHIPPED | OUTPATIENT
Start: 2020-03-11 | End: 2020-03-12

## 2020-03-11 RX ORDER — KETOCONAZOLE 20 MG/G
CREAM TOPICAL DAILY
Qty: 60 G | Refills: 1 | Status: SHIPPED | OUTPATIENT
Start: 2020-03-11 | End: 2021-04-09

## 2020-03-11 RX ORDER — ACYCLOVIR 400 MG/1
TABLET ORAL
Qty: 90 TABLET | Refills: 3 | Status: SHIPPED | OUTPATIENT
Start: 2020-03-11 | End: 2020-03-11

## 2020-03-11 ASSESSMENT — ENCOUNTER SYMPTOMS
FEVER: 0
HEMATOCHEZIA: 0
NERVOUS/ANXIOUS: 0
EYE PAIN: 0
COUGH: 0
FREQUENCY: 0
DIARRHEA: 0
DIZZINESS: 0
CONSTIPATION: 0
ABDOMINAL PAIN: 0
CHILLS: 0
HEMATURIA: 0

## 2020-03-11 ASSESSMENT — MIFFLIN-ST. JEOR: SCORE: 1447.25

## 2020-03-11 NOTE — PROGRESS NOTES
SUBJECTIVE:   CC: Mary Irizarry is an 59 year old woman who presents for preventive health visit.     Healthy Habits:     Getting at least 3 servings of Calcium per day:  Yes    Bi-annual eye exam:  Yes    Dental care twice a year:  Yes    Sleep apnea or symptoms of sleep apnea:  None    Diet:  Regular (no restrictions)    Frequency of exercise:  2-3 days/week    Duration of exercise:  30-45 minutes    Taking medications regularly:  Yes    Medication side effects:  Muscle aches    PHQ-2 Total Score: 0    Additional concerns today:  Yes      Pt with recurrent cold sores which stopped recurring when she started acyclovir daily  She needs refill of acyclovir  PT with bilateral athletes foot. Needs refill of topical antifungals  Pt with seasonal allergies needing refill of zyrtec  meds are working well. No concerns        Today's PHQ-2 Score:   PHQ-2 ( 1999 Pf[employer]) 3/11/2020   Q1: Little interest or pleasure in doing things 0   Q2: Feeling down, depressed or hopeless 0   PHQ-2 Score 0   Q1: Little interest or pleasure in doing things Not at all   Q2: Feeling down, depressed or hopeless Not at all   PHQ-2 Score 0       Abuse: Current or Past(Physical, Sexual or Emotional)- No  Do you feel safe in your environment? Yes        Social History     Tobacco Use     Smoking status: Former Smoker     Smokeless tobacco: Never Used   Substance Use Topics     Alcohol use: Yes     If you drink alcohol do you typically have >3 drinks per day or >7 drinks per week? No    Alcohol Use 3/11/2020   Prescreen: >3 drinks/day or >7 drinks/week? No       Reviewed orders with patient.  Reviewed health maintenance and updated orders accordingly - Yes  Lab work is in process    Mammogram Screening: Patient over age 50, mutual decision to screen reflected in health maintenance.    Pertinent mammograms are reviewed under the imaging tab.  History of abnormal Pap smear: NO - age 30-65 PAP every 5 years with negative HPV co-testing  "recommended     Reviewed and updated as needed this visit by clinical staff  Tobacco  Allergies  Meds  Med Hx  Surg Hx  Fam Hx  Soc Hx        Reviewed and updated as needed this visit by Provider            Review of Systems   Constitutional: Negative for chills and fever.   HENT: Negative for congestion and ear pain.    Eyes: Negative for pain.   Respiratory: Negative for cough.    Cardiovascular: Negative for chest pain.   Gastrointestinal: Negative for abdominal pain, constipation, diarrhea and hematochezia.   Genitourinary: Negative for frequency and hematuria.   Neurological: Negative for dizziness.   Psychiatric/Behavioral: The patient is not nervous/anxious.      CONSTITUTIONAL: NEGATIVE for fever, chills, change in weight  INTEGUMENTARY/SKIN: NEGATIVE for worrisome rashes, moles or lesions  EYES: NEGATIVE for vision changes or irritation  ENT: NEGATIVE for ear, mouth and throat problems  RESP: NEGATIVE for significant cough or SOB  BREAST: NEGATIVE for masses, tenderness or discharge  CV: NEGATIVE for chest pain, palpitations or peripheral edema  GI: NEGATIVE for nausea, abdominal pain, heartburn, or change in bowel habits  : NEGATIVE for unusual urinary or vaginal symptoms. No vaginal bleeding.  MUSCULOSKELETAL: NEGATIVE for significant arthralgias or myalgia  NEURO: NEGATIVE for weakness, dizziness or paresthesias  PSYCHIATRIC: NEGATIVE for changes in mood or affect      OBJECTIVE:   /79   Pulse 57   Temp 98.6  F (37  C) (Oral)   Ht 1.753 m (5' 9\")   Wt 80.3 kg (177 lb)   BMI 26.14 kg/m    Physical Exam  GENERAL: healthy, alert and no distress  EYES: Eyes grossly normal to inspection, PERRL and conjunctivae and sclerae normal  HENT: ear canals and TM's normal, nose and mouth without ulcers or lesions  NECK: no adenopathy, no asymmetry, masses, or scars and thyroid normal to palpation  RESP: lungs clear to auscultation - no rales, rhonchi or wheezes  BREAST: normal without masses, " tenderness or nipple discharge and no palpable axillary masses or adenopathy  CV: regular rate and rhythm, normal S1 S2, no S3 or S4, no murmur, click or rub, no peripheral edema and peripheral pulses strong  ABDOMEN: soft, nontender, no hepatosplenomegaly, no masses and bowel sounds normal   (female): normal female external genitalia, normal urethral meatus, vaginal mucosa pink, moist, well rugated, and normal cervix/adnexa/uterus without masses or discharge  MS: no gross musculoskeletal defects noted, no edema  SKIN: no suspicious lesions or rashes  NEURO: Normal strength and tone, mentation intact and speech normal  PSYCH: mentation appears normal, affect normal/bright    Diagnostic Test Results:  Labs reviewed in Epic    ASSESSMENT/PLAN:   1. Routine history and physical examination of adult      2. Herpes simplex virus infection  Refill as needed  - acyclovir (ZOVIRAX) 400 MG tablet; TAKE 1 (ONE) TABLET BY MOUTH DAILY FOR HERPES SUPPRESSION  Dispense: 90 tablet; Refill: 3    3. Tinea pedis of both feet  Refill as needed  - ketoconazole (NIZORAL) 2 % external cream; Apply topically daily  Dispense: 60 g; Refill: 1    4. Seasonal allergies  Refill as needed  - cetirizine (ZYRTEC) 10 MG tablet; Take 1 tablet (10 mg) by mouth daily  Dispense: 90 tablet; Refill: 3    5. Encounter for screening mammogram for breast cancer  scheduled    6. Screening for malignant neoplasm of cervix  completed  - Pap imaged thin layer screen with HPV - recommended age 30 - 65 years (select HPV order below)  - HPV High Risk Types DNA Cervical    7. Screening cholesterol level    - Lipid panel reflex to direct LDL Fasting    8. Screening for diabetes mellitus    - Basic metabolic panel    9. Insomnia, unspecified type  Asking for few when she travels  - zolpidem (AMBIEN) 5 MG tablet; Take 1 tablet (5 mg) by mouth nightly as needed for sleep  Dispense: 10 tablet; Refill: 0    COUNSELING:  Reviewed preventive health counseling, as  "reflected in patient instructions       Regular exercise       Healthy diet/nutrition       Vision screening       Colon cancer screening       (Pam)menopause management    Estimated body mass index is 26.14 kg/m  as calculated from the following:    Height as of this encounter: 1.753 m (5' 9\").    Weight as of this encounter: 80.3 kg (177 lb).         reports that she has quit smoking. She has never used smokeless tobacco.      Counseling Resources:  ATP IV Guidelines  Pooled Cohorts Equation Calculator  Breast Cancer Risk Calculator  FRAX Risk Assessment  ICSI Preventive Guidelines  Dietary Guidelines for Americans, 2010  USDA's MyPlate  ASA Prophylaxis  Lung CA Screening    Carol Hidalgo MD  [facility]  "

## 2020-03-12 LAB
ANION GAP SERPL CALCULATED.3IONS-SCNC: 5 MMOL/L (ref 3–14)
BUN SERPL-MCNC: 17 MG/DL (ref 7–30)
CALCIUM SERPL-MCNC: 10.7 MG/DL (ref 8.5–10.1)
CHLORIDE SERPL-SCNC: 108 MMOL/L (ref 94–109)
CHOLEST SERPL-MCNC: 236 MG/DL
CO2 SERPL-SCNC: 26 MMOL/L (ref 20–32)
CREAT SERPL-MCNC: 0.92 MG/DL (ref 0.52–1.04)
GFR SERPL CREATININE-BSD FRML MDRD: 68 ML/MIN/{1.73_M2}
GLUCOSE SERPL-MCNC: 95 MG/DL (ref 70–99)
HDLC SERPL-MCNC: 95 MG/DL
LDLC SERPL CALC-MCNC: 125 MG/DL
NONHDLC SERPL-MCNC: 141 MG/DL
POTASSIUM SERPL-SCNC: 4.9 MMOL/L (ref 3.4–5.3)
SODIUM SERPL-SCNC: 139 MMOL/L (ref 133–144)
TRIGL SERPL-MCNC: 79 MG/DL

## 2020-03-12 RX ORDER — ACYCLOVIR 400 MG/1
TABLET ORAL
Qty: 90 TABLET | Refills: 3 | Status: SHIPPED | OUTPATIENT
Start: 2020-03-12 | End: 2021-03-19

## 2020-03-13 ENCOUNTER — ANCILLARY PROCEDURE (OUTPATIENT)
Dept: GENERAL RADIOLOGY | Facility: CLINIC | Age: 60
End: 2020-03-13
Attending: FAMILY MEDICINE
Payer: COMMERCIAL

## 2020-03-13 ENCOUNTER — OFFICE VISIT (OUTPATIENT)
Dept: ORTHOPEDICS | Facility: CLINIC | Age: 60
End: 2020-03-13
Payer: COMMERCIAL

## 2020-03-13 VITALS
SYSTOLIC BLOOD PRESSURE: 124 MMHG | BODY MASS INDEX: 26.22 KG/M2 | WEIGHT: 177 LBS | HEIGHT: 69 IN | DIASTOLIC BLOOD PRESSURE: 82 MMHG

## 2020-03-13 DIAGNOSIS — M25.561 BILATERAL KNEE PAIN: ICD-10-CM

## 2020-03-13 DIAGNOSIS — M25.561 CHRONIC PAIN OF BOTH KNEES: ICD-10-CM

## 2020-03-13 DIAGNOSIS — M17.0 PRIMARY OSTEOARTHRITIS OF BOTH KNEES: Primary | ICD-10-CM

## 2020-03-13 DIAGNOSIS — G89.29 CHRONIC PAIN OF BOTH KNEES: ICD-10-CM

## 2020-03-13 DIAGNOSIS — M25.562 CHRONIC PAIN OF BOTH KNEES: ICD-10-CM

## 2020-03-13 DIAGNOSIS — M25.562 BILATERAL KNEE PAIN: ICD-10-CM

## 2020-03-13 PROCEDURE — 73562 X-RAY EXAM OF KNEE 3: CPT

## 2020-03-13 PROCEDURE — 99204 OFFICE O/P NEW MOD 45 MIN: CPT | Performed by: FAMILY MEDICINE

## 2020-03-13 ASSESSMENT — MIFFLIN-ST. JEOR: SCORE: 1447.25

## 2020-03-13 NOTE — PATIENT INSTRUCTIONS
Diagnosis: Bilateral knee pain likely due to mild arthritis  Image Findings: Very mild arthritis on x-ray  Treatment: Home exercises, if not improved can go to physical therapy  Job: No restrictions  Medications: Limited tylenol/ibuprofen for pain for 1-2 weeks  Follow-up: As needed if not improved in 3-4 weeks sooner if worsening    It was great seeing you today!    Alfred Catalan

## 2020-03-13 NOTE — LETTER
3/13/2020         RE: Mary Irizarry  [address] [facility] [address]        Dear Colleague,    Thank you for referring your patient, Mary Irizarry, to the [facility] [place]. Please see a copy of my visit note below.    ASSESSMENT & PLAN  Mary was seen today for pain and pain.    Diagnoses and all orders for this visit:    Bilateral knee pain  -     XR Knee Standing Bilateral 3 Views; Future      Patient is a 59 year old female presenting for evaluation of   Chief Complaint   Patient presents with     Left Knee - Pain     Right Knee - Pain      # Bilateral Knee Arthritis: Longstanding condition worse in the anterior portion.  Pt noting TP over patella, TTP over patella facets, pos grind test.  XR showing mild PF arthrosis likely cause of pain.  Counseled patient on nature of condition and treatment options.  Given this plan as below, follow-up as needed  Treatment: Activities as tolerated  Physical Therapy HEP given today, if not improved can refer for formal PT  Injection defer for now can f/u PRN for possible steroid injections  Medications  Limited NSAIDs/Tylenol    Concerning signs/sx that would warrant urgent evaluation were discussed.  All questions were answered, patient understands and agrees with plan.      Return if symptoms worsen or fail to improve.    -----    SUBJECTIVE  Mary Irizarry is a/an 59 year old female who is seen as a self referral for evaluation of bilateral knee pain. The patient is seen by themselves.    Onset: 15 years(s) ago. Reports insidious onset without acute precipitating event.    Location of Pain: bilateral anterior knee   Rating of Pain at worst: 10/10  Rating of Pain Currently: 6/10  Worsened by: squatting, lateral movement, going up stairs   Better with: rest   Treatments tried: rest/activity avoidance  Associated symptoms: no distal numbness or tingling; denies swelling or warmth  Orthopedic history: YES -  "chronic  Relevant surgical history: NO  Social: Desk job  History reviewed. No pertinent past medical history.  Social History     Socioeconomic History     Marital status: Single     Spouse name: None     Number of children: None     Years of education: None     Highest education level: None   Occupational History     None   Social Needs     Financial resource strain: None     Food insecurity     Worry: None     Inability: None     Transportation needs     Medical: None     Non-medical: None   Tobacco Use     Smoking status: Former Smoker     Smokeless tobacco: Never Used   Substance and Sexual Activity     Alcohol use: Yes     Drug use: No     Sexual activity: Never   Lifestyle     Physical activity     Days per week: None     Minutes per session: None     Stress: None   Relationships     Social connections     Talks on phone: None     Gets together: None     Attends Pentecostal service: None     Active member of club or organization: None     Attends meetings of clubs or organizations: None     Relationship status: None     Intimate partner violence     Fear of current or ex partner: None     Emotionally abused: None     Physically abused: None     Forced sexual activity: None   Other Topics Concern     Parent/sibling w/ CABG, MI or angioplasty before 65F 55M? Not Asked   Social History Narrative     None         Patient's past medical, surgical, social, and family histories were reviewed today and no changes are noted.  No family history pertinent to the patient's problem today      REVIEW OF SYSTEMS:  10 point ROS is negative other than symptoms noted above in HPI, Past Medical History or as stated below  Constitutional: NEGATIVE for fever, chills, change in weight  Skin: NEGATIVE for worrisome rashes, moles or lesions  GI/: NEGATIVE for bowel or bladder changes  Neuro: NEGATIVE for weakness, dizziness or paresthesias    OBJECTIVE:  /82   Ht 1.753 m (5' 9\")   Wt 80.3 kg (177 lb)   BMI 26.14 kg/m   "   General: healthy, alert and in no distress  HEENT: no scleral icterus or conjunctival erythema  Skin: no suspicious lesions or rash. No jaundice.  CV: no pedal edema  Resp: normal respiratory effort without conversational dyspnea   Psych: normal mood and affect  Gait: normal steady gait with appropriate coordination and balance  Neuro: Normal light sensory exam of lower extremity  MSK:  BILATERAL KNEE  Inspection:    normal alignment  Palpation:    Tender about the lateral patellar facet and medial patellar facet. Remainder of bony and ligamentous landmarks are nontender.    Mild effusion is present    Patellofemoral crepitus is Present  Range of Motion:     00 extension to 1350 flexion  Strength:    Quadriceps 5/5, hamstrings 5/5, gastrocsoleus 5/5 and tibialis anterior 5/5    Extensor mechanism intact  Special Tests:    Positive: Patellar grind    Negative: MCL/valgus stress (0 & 30 deg), LCL/varus stress (0 & 30 deg), Lachman's, anterior drawer, posterior drawer, Marlen's    Independent visualization of the below image:  Recent Results (from the past 24 hour(s))   XR Knee Standing Bilateral 3 Views    Narrative    XR KNEE STANDING BILATERAL 3 VW 3/13/2020 2:23 PM     HISTORY: Bilateral knee pain; Bilateral knee pain    COMPARISON: None.       Impression    IMPRESSION: There is at least mild to moderate degenerative changes of  the patellofemoral compartment of the right knee. Osteocartilaginous  ossific bodies along the posterior knee are probably within a Baker's  cyst and measure up to 7 mm. No significant joint effusion. No  fracture or dislocation.    Views of left knee show mild patellofemoral compartment degenerative  change. No fracture or dislocation. No significant joint effusion.    TENNILLE SANCHEZ MD       I  ordered, visualized and reviewed these images with the patient  PF arthrosis moderate, preserved medial/lateral joint spaces, posterior right knee calcification         Alfred Catalan MD,  CAM  [facility]      Again, thank you for allowing me to participate in the care of your patient.        Sincerely,        Alfred Catalan MD

## 2020-03-13 NOTE — PROGRESS NOTES
ASSESSMENT & PLAN  Mary was seen today for pain and pain.    Diagnoses and all orders for this visit:    Bilateral knee pain  -     XR Knee Standing Bilateral 3 Views; Future      Patient is a 59 year old female presenting for evaluation of   Chief Complaint   Patient presents with     Left Knee - Pain     Right Knee - Pain      # Bilateral Knee Arthritis: Longstanding condition worse in the anterior portion.  Pt noting TP over patella, TTP over patella facets, pos grind test.  XR showing mild PF arthrosis likely cause of pain.  Counseled patient on nature of condition and treatment options.  Given this plan as below, follow-up as needed  Treatment: Activities as tolerated  Physical Therapy HEP given today, if not improved can refer for formal PT  Injection defer for now can f/u PRN for possible steroid injections  Medications  Limited NSAIDs/Tylenol    Concerning signs/sx that would warrant urgent evaluation were discussed.  All questions were answered, patient understands and agrees with plan.      Return if symptoms worsen or fail to improve.    -----    SUBJECTIVE  Mary Irizarry is a/an 59 year old female who is seen as a self referral for evaluation of bilateral knee pain. The patient is seen by themselves.    Onset: 15 years(s) ago. Reports insidious onset without acute precipitating event.    Location of Pain: bilateral anterior knee   Rating of Pain at worst: 10/10  Rating of Pain Currently: 6/10  Worsened by: squatting, lateral movement, going up stairs   Better with: rest   Treatments tried: rest/activity avoidance  Associated symptoms: no distal numbness or tingling; denies swelling or warmth  Orthopedic history: YES - chronic  Relevant surgical history: NO  Social: Desk job  History reviewed. No pertinent past medical history.  Social History     Socioeconomic History     Marital status: Single     Spouse name: None     Number of children: None     Years of education: None     Highest  "education level: None   Occupational History     None   Social Needs     Financial resource strain: None     Food insecurity     Worry: None     Inability: None     Transportation needs     Medical: None     Non-medical: None   Tobacco Use     Smoking status: Former Smoker     Smokeless tobacco: Never Used   Substance and Sexual Activity     Alcohol use: Yes     Drug use: No     Sexual activity: Never   Lifestyle     Physical activity     Days per week: None     Minutes per session: None     Stress: None   Relationships     Social connections     Talks on phone: None     Gets together: None     Attends Restorationism service: None     Active member of club or organization: None     Attends meetings of clubs or organizations: None     Relationship status: None     Intimate partner violence     Fear of current or ex partner: None     Emotionally abused: None     Physically abused: None     Forced sexual activity: None   Other Topics Concern     Parent/sibling w/ CABG, MI or angioplasty before 65F 55M? Not Asked   Social History Narrative     None         Patient's past medical, surgical, social, and family histories were reviewed today and no changes are noted.  No family history pertinent to the patient's problem today      REVIEW OF SYSTEMS:  10 point ROS is negative other than symptoms noted above in HPI, Past Medical History or as stated below  Constitutional: NEGATIVE for fever, chills, change in weight  Skin: NEGATIVE for worrisome rashes, moles or lesions  GI/: NEGATIVE for bowel or bladder changes  Neuro: NEGATIVE for weakness, dizziness or paresthesias    OBJECTIVE:  /82   Ht 1.753 m (5' 9\")   Wt 80.3 kg (177 lb)   BMI 26.14 kg/m     General: healthy, alert and in no distress  HEENT: no scleral icterus or conjunctival erythema  Skin: no suspicious lesions or rash. No jaundice.  CV: no pedal edema  Resp: normal respiratory effort without conversational dyspnea   Psych: normal mood and affect  Gait: " normal steady gait with appropriate coordination and balance  Neuro: Normal light sensory exam of lower extremity  MSK:  BILATERAL KNEE  Inspection:    normal alignment  Palpation:    Tender about the lateral patellar facet and medial patellar facet. Remainder of bony and ligamentous landmarks are nontender.    Mild effusion is present    Patellofemoral crepitus is Present  Range of Motion:     00 extension to 1350 flexion  Strength:    Quadriceps 5/5, hamstrings 5/5, gastrocsoleus 5/5 and tibialis anterior 5/5    Extensor mechanism intact  Special Tests:    Positive: Patellar grind    Negative: MCL/valgus stress (0 & 30 deg), LCL/varus stress (0 & 30 deg), Lachman's, anterior drawer, posterior drawer, Marlen's    Independent visualization of the below image:  Recent Results (from the past 24 hour(s))   XR Knee Standing Bilateral 3 Views    Narrative    XR KNEE STANDING BILATERAL 3 VW 3/13/2020 2:23 PM     HISTORY: Bilateral knee pain; Bilateral knee pain    COMPARISON: None.       Impression    IMPRESSION: There is at least mild to moderate degenerative changes of  the patellofemoral compartment of the right knee. Osteocartilaginous  ossific bodies along the posterior knee are probably within a Baker's  cyst and measure up to 7 mm. No significant joint effusion. No  fracture or dislocation.    Views of left knee show mild patellofemoral compartment degenerative  change. No fracture or dislocation. No significant joint effusion.    TENNILLE SANCHEZ MD       I  ordered, visualized and reviewed these images with the patient  PF arthrosis moderate, preserved medial/lateral joint spaces, posterior right knee calcification         Alfred Catalan MD, [facility]

## 2020-03-16 LAB
COPATH REPORT: NORMAL
PAP: NORMAL

## 2020-03-17 LAB
FINAL DIAGNOSIS: NORMAL
HPV HR 12 DNA CVX QL NAA+PROBE: NEGATIVE
HPV16 DNA SPEC QL NAA+PROBE: NEGATIVE
HPV18 DNA SPEC QL NAA+PROBE: NEGATIVE
SPECIMEN DESCRIPTION: NORMAL
SPECIMEN SOURCE CVX/VAG CYTO: NORMAL

## 2020-06-05 ENCOUNTER — APPOINTMENT (OUTPATIENT)
Age: 60
Setting detail: DERMATOLOGY
End: 2020-06-05

## 2020-06-05 VITALS — HEIGHT: 70 IN | RESPIRATION RATE: 14 BRPM | WEIGHT: 180 LBS

## 2020-06-05 DIAGNOSIS — L81.4 OTHER MELANIN HYPERPIGMENTATION: ICD-10-CM

## 2020-06-05 DIAGNOSIS — L57.8 OTHER SKIN CHANGES DUE TO CHRONIC EXPOSURE TO NONIONIZING RADIATION: ICD-10-CM

## 2020-06-05 DIAGNOSIS — L82.1 OTHER SEBORRHEIC KERATOSIS: ICD-10-CM

## 2020-06-05 DIAGNOSIS — L82.0 INFLAMED SEBORRHEIC KERATOSIS: ICD-10-CM

## 2020-06-05 PROCEDURE — OTHER COUNSELING: OTHER

## 2020-06-05 PROCEDURE — OTHER REASSURANCE: OTHER

## 2020-06-05 PROCEDURE — 17111 DESTRUCT LESION 15 OR MORE: CPT

## 2020-06-05 PROCEDURE — 99214 OFFICE O/P EST MOD 30 MIN: CPT | Mod: 25

## 2020-06-05 PROCEDURE — OTHER LIQUID NITROGEN: OTHER

## 2020-06-05 ASSESSMENT — LOCATION SIMPLE DESCRIPTION DERM
LOCATION SIMPLE: LEFT FOREARM
LOCATION SIMPLE: ABDOMEN
LOCATION SIMPLE: LEFT UPPER BACK
LOCATION SIMPLE: LEFT PRETIBIAL REGION
LOCATION SIMPLE: RIGHT FOREARM
LOCATION SIMPLE: RIGHT THIGH
LOCATION SIMPLE: RIGHT UPPER BACK

## 2020-06-05 ASSESSMENT — LOCATION DETAILED DESCRIPTION DERM
LOCATION DETAILED: RIGHT PROXIMAL RADIAL DORSAL FOREARM
LOCATION DETAILED: LEFT SUPERIOR UPPER BACK
LOCATION DETAILED: RIGHT INFERIOR UPPER BACK
LOCATION DETAILED: RIGHT ANTERIOR DISTAL THIGH
LOCATION DETAILED: LEFT PROXIMAL DORSAL FOREARM
LOCATION DETAILED: LEFT PROXIMAL PRETIBIAL REGION
LOCATION DETAILED: EPIGASTRIC SKIN

## 2020-06-05 ASSESSMENT — LOCATION ZONE DERM
LOCATION ZONE: LEG
LOCATION ZONE: TRUNK
LOCATION ZONE: ARM

## 2020-06-05 NOTE — PROCEDURE: LIQUID NITROGEN
Add 52 Modifier (Optional): no
Consent: The patient's consent was obtained including but not limited to risks of crusting, scabbing, blistering, scarring, darker or lighter pigmentary change, recurrence, incomplete removal and infection.
Medical Necessity Information: It is in your best interest to select a reason for this procedure from the list below. All of these items fulfill various CMS LCD requirements except the new and changing color options.
Post-Care Instructions: I reviewed with the patient in detail post-care instructions. Patient is to wear sunprotection, and avoid picking at any of the treated lesions. Pt may apply Vaseline to crusted or scabbing areas.
Medical Necessity Clause: This procedure was medically necessary because the lesions that were treated were:
Detail Level: Simple
Render Post Care In The Note?: yes
Duration Of Freeze Thaw-Cycle (Seconds): 5
Total Number Of Lesions Treated: 30
Number Of Freeze-Thaw Cycles: 3 freeze-thaw cycles

## 2020-06-10 ENCOUNTER — RX ONLY (RX ONLY)
Age: 60
End: 2020-06-10

## 2020-06-10 RX ORDER — TRETIONIN 0.5 MG/G
0.06 CREAM TOPICAL QHS
Qty: 1 | Refills: 3 | Status: ERX | COMMUNITY
Start: 2020-06-10

## 2020-06-12 ENCOUNTER — APPOINTMENT (OUTPATIENT)
Age: 60
Setting detail: DERMATOLOGY
End: 2020-06-12

## 2020-06-12 DIAGNOSIS — L82.1 OTHER SEBORRHEIC KERATOSIS: ICD-10-CM

## 2020-06-12 PROCEDURE — OTHER COUNSELING: OTHER

## 2020-06-12 PROCEDURE — OTHER LIQUID NITROGEN (COSMETIC): OTHER

## 2020-06-12 ASSESSMENT — LOCATION SIMPLE DESCRIPTION DERM: LOCATION SIMPLE: LEFT UPPER BACK

## 2020-06-12 ASSESSMENT — LOCATION ZONE DERM: LOCATION ZONE: TRUNK

## 2020-06-12 ASSESSMENT — LOCATION DETAILED DESCRIPTION DERM: LOCATION DETAILED: LEFT SUPERIOR MEDIAL UPPER BACK

## 2020-06-12 NOTE — PROCEDURE: LIQUID NITROGEN (COSMETIC)
Price (Use Numbers Only, No Special Characters Or $): 425.00
Consent: The patient's consent was obtained including but not limited to risks of crusting, scabbing, blistering, scarring, darker or lighter pigmentary change, recurrence, incomplete removal and infection.
Total Number Of Lesions Treated: 71
Post-Care Instructions: I reviewed with the patient in detail post-care instructions. Patient is to wear sunprotection, and avoid picking at any of the treated lesions. Pt may apply Vaseline to crusted or scabbing areas.
Duration Of Freeze Thaw-Cycle (Seconds): 0
Detail Level: Zone
Render Post Care In The Note?: yes

## 2020-09-23 ENCOUNTER — RX ONLY (RX ONLY)
Age: 60
End: 2020-09-23

## 2020-09-23 RX ORDER — TRETIONIN 0.25 MG/G
CREAM TOPICAL
Qty: 1 | Refills: 2

## 2020-09-23 RX ORDER — TRETIONIN 0.25 MG/G
CREAM TOPICAL
Qty: 1 | Refills: 2 | Status: ERX

## 2020-10-06 ENCOUNTER — OFFICE VISIT (OUTPATIENT)
Dept: OPTOMETRY | Facility: CLINIC | Age: 60
End: 2020-10-06
Payer: COMMERCIAL

## 2020-10-06 DIAGNOSIS — H11.31 SUBCONJUNCTIVAL HEMORRHAGE OF RIGHT EYE: Primary | ICD-10-CM

## 2020-10-06 PROCEDURE — 99203 OFFICE O/P NEW LOW 30 MIN: CPT | Performed by: OPTOMETRIST

## 2020-10-06 ASSESSMENT — SLIT LAMP EXAM - LIDS
COMMENTS: NORMAL
COMMENTS: NORMAL

## 2020-10-06 ASSESSMENT — CONF VISUAL FIELD
OD_NORMAL: 1
OS_NORMAL: 1

## 2020-10-06 ASSESSMENT — EXTERNAL EXAM - LEFT EYE: OS_EXAM: NORMAL

## 2020-10-06 ASSESSMENT — EXTERNAL EXAM - RIGHT EYE: OD_EXAM: NORMAL

## 2020-10-06 ASSESSMENT — VISUAL ACUITY
OD_SC: 20/20
OS_SC: 20/40
METHOD: SNELLEN - LINEAR
OD_SC+: -1
OS_SC+: -1

## 2020-10-06 NOTE — PROGRESS NOTES
"Chief Complaint   Patient presents with     Eye Problem Both Eyes     Current Eye Medications:  None    Symptoms started Sunday night with the right eye feeling like allergies, by 9 am next morning eye was red.    Right eye: Red and a little discharge (eye is just more moist - no crust or build up.)    Both eyes burn.    Patient denies any pain.  Patient does not  think there was any trauma.  Patient thinks she has had this up to 3 times this year.  Last episode was maybe 4 months ago, but not as bad - same eye.    Patient uses reading glasses when needed.    Patient suspects she is allergic to all floxacins    Eyes: redness right eye, irritation both eyes  Constitutional: No fevers, chills, or weight changes.    Medical, surgical and family histories reviewed and updated 10/6/2020.       OBJECTIVE: See Ophthalmology exam    ASSESSMENT:    ICD-10-CM    1. Subconjunctival hemorrhage of right eye  H11.31       PLAN:     Patient Instructions   You have what is called a \"subconjunctival hemorrhage\", or bleeding under the clear film that covers the eye's surface. This is caused by a break in a small blood vessel under the clear film in the eye, which causes blood to pool beneath. There is no treatment for this condition and it should resolve on it's own within ~2 weeks.     Ok to use artificial tears if your eyes feel irritated and dry. There are over the counter drops that work well and may be used up to 4 x daily (Systane , Refresh, Thera Tears)- avoid \"get the red out\" drops.     Allergies can cause irritation of the eyes.  I recommend ketotifen drops (Alaway or Zaditor) to be used 2x daily in each eye. These drops are available over the counter.      Return to clinic as needed.     Garret Armas, OD  [facility]  [address] [place] [address]. [place]  [address] [phone]         "

## 2020-10-06 NOTE — LETTER
"    10/6/2020         RE: Mary Irizarry  [address] [facility] [address]        Dear Colleague,    Thank you for referring your patient, Mary Irizarry, to the [facility]. Please see a copy of my visit note below.    Chief Complaint   Patient presents with     Eye Problem Both Eyes     Current Eye Medications:  None    Symptoms started Sunday night with the right eye feeling like allergies, by 9 am next morning eye was red.    Right eye: Red and a little discharge (eye is just more moist - no crust or build up.)    Both eyes burn.    Patient denies any pain.  Patient does not  think there was any trauma.  Patient thinks she has had this up to 3 times this year.  Last episode was maybe 4 months ago, but not as bad - same eye.    Patient uses reading glasses when needed.    Patient suspects she is allergic to all floxacins    Eyes: redness right eye, irritation both eyes  Constitutional: No fevers, chills, or weight changes.    Medical, surgical and family histories reviewed and updated 10/6/2020.       OBJECTIVE: See Ophthalmology exam    ASSESSMENT:    ICD-10-CM    1. Subconjunctival hemorrhage of right eye  H11.31       PLAN:     Patient Instructions   You have what is called a \"subconjunctival hemorrhage\", or bleeding under the clear film that covers the eye's surface. This is caused by a break in a small blood vessel under the clear film in the eye, which causes blood to pool beneath. There is no treatment for this condition and it should resolve on it's own within ~2 weeks.     Ok to use artificial tears if your eyes feel irritated and dry. There are over the counter drops that work well and may be used up to 4 x daily (Systane , Refresh, Thera Tears)- avoid \"get the red out\" drops.     Allergies can cause irritation of the eyes.  I recommend ketotifen drops (Alaway or Zaditor) to be used 2x daily in each eye. These drops are available over the counter.      Return to " clinic as needed.     Garret Armas OD  [address] [place]  [address]    [phone]             Again, thank you for allowing me to participate in the care of your patient.        Sincerely,        Garret Armas OD

## 2020-10-06 NOTE — PATIENT INSTRUCTIONS
"You have what is called a \"subconjunctival hemorrhage\", or bleeding under the clear film that covers the eye's surface. This is caused by a break in a small blood vessel under the clear film in the eye, which causes blood to pool beneath. There is no treatment for this condition and it should resolve on it's own within ~2 weeks.     Ok to use artificial tears if your eyes feel irritated and dry. There are over the counter drops that work well and may be used up to 4 x daily (Systane , Refresh, Thera Tears)- avoid \"get the red out\" drops.     Allergies can cause irritation of the eyes.  I recommend ketotifen drops (Alaway or Zaditor) to be used 2x daily in each eye. These drops are available over the counter.      Return to clinic as needed.     Garret Armas, OD  [facility]  [address] [place] [address]. [place]  [address]    [phone]      "

## 2020-10-08 ENCOUNTER — TELEPHONE (OUTPATIENT)
Dept: ORTHOPEDICS | Facility: CLINIC | Age: 60
End: 2020-10-08

## 2020-10-08 NOTE — TELEPHONE ENCOUNTER
Patient states she would like a call to discuss knee issues. Aware that DLynne Y is out. Would like myofacial relief to break up adhesions. Please call.

## 2020-10-08 NOTE — TELEPHONE ENCOUNTER
Spoke with patient. She was working with a  at her gym and he recommended the patient try Myofacial release therapy for her hamstrings. She was unsure where myofacial release was done.     Per chart review patient was referred to PT back in March by Dr Catalan. Suggested to patient that she stated with a PT to discuss knee pain and treatment for leg pain. She will call to schedule PT at her convenience.    Artis Jain ATC, LAT

## 2020-10-09 ENCOUNTER — THERAPY VISIT (OUTPATIENT)
Dept: PHYSICAL THERAPY | Facility: CLINIC | Age: 60
End: 2020-10-09
Payer: COMMERCIAL

## 2020-10-09 DIAGNOSIS — M17.0 PRIMARY OSTEOARTHRITIS OF BOTH KNEES: Primary | ICD-10-CM

## 2020-10-09 PROCEDURE — 97110 THERAPEUTIC EXERCISES: CPT | Mod: GP | Performed by: PHYSICAL THERAPIST

## 2020-10-09 PROCEDURE — 97161 PT EVAL LOW COMPLEX 20 MIN: CPT | Mod: GP | Performed by: PHYSICAL THERAPIST

## 2020-10-09 ASSESSMENT — ACTIVITIES OF DAILY LIVING (ADL)
AS_A_RESULT_OF_YOUR_KNEE_INJURY,_HOW_WOULD_YOU_RATE_YOUR_CURRENT_LEVEL_OF_DAILY_ACTIVITY?: ABNORMAL
PAIN: THE SYMPTOM AFFECTS MY ACTIVITY MODERATELY
HOW_WOULD_YOU_RATE_THE_CURRENT_FUNCTION_OF_YOUR_KNEE_DURING_YOUR_USUAL_DAILY_ACTIVITIES_ON_A_SCALE_FROM_0_TO_100_WITH_100_BEING_YOUR_LEVEL_OF_KNEE_FUNCTION_PRIOR_TO_YOUR_INJURY_AND_0_BEING_THE_INABILITY_TO_PERFORM_ANY_OF_YOUR_USUAL_DAILY_ACTIVITIES?: 50
KNEEL ON THE FRONT OF YOUR KNEE: I AM UNABLE TO DO THE ACTIVITY
LIMPING: I DO NOT HAVE THE SYMPTOM
KNEE_ACTIVITY_OF_DAILY_LIVING_SUM: 42
GO UP STAIRS: ACTIVITY IS VERY DIFFICULT
STAND: ACTIVITY IS NOT DIFFICULT
RISE FROM A CHAIR: ACTIVITY IS MINIMALLY DIFFICULT
RAW_SCORE: 42
SIT WITH YOUR KNEE BENT: ACTIVITY IS NOT DIFFICULT
KNEE_ACTIVITY_OF_DAILY_LIVING_SCORE: 60
GO DOWN STAIRS: ACTIVITY IS VERY DIFFICULT
WALK: ACTIVITY IS FAIRLY DIFFICULT
SQUAT: I AM UNABLE TO DO THE ACTIVITY
WEAKNESS: THE SYMPTOM AFFECTS MY ACTIVITY MODERATELY
STIFFNESS: I DO NOT HAVE THE SYMPTOM
GIVING WAY, BUCKLING OR SHIFTING OF KNEE: I DO NOT HAVE THE SYMPTOM
HOW_WOULD_YOU_RATE_THE_OVERALL_FUNCTION_OF_YOUR_KNEE_DURING_YOUR_USUAL_DAILY_ACTIVITIES?: ABNORMAL
SWELLING: I DO NOT HAVE THE SYMPTOM

## 2020-10-09 NOTE — PROGRESS NOTES
Physical Therapy Initial Evaluation  October 9, 2020     MD Instructions/Precautions/Restrictions: PT eval and treat.     Therapist Impression:   Mary Irizarry presents with findings consistent with bilateral knee pain consistent with chondromalacia, with related impairments limiting her ability to navigate stairs and squat/kneel down. Skilled PT services are necessary in order to reduce impairments and improve independent function.     Subjective:   C/C: chronic hx of right knee pain. Since onset, symptoms are worsening. Denies buckling and achy in the morning. Also reports diffuse posterior knee     DOI/onset: 3/13/20 (MD's orders)  Red Flags:none reported per patient  Location: circumference of patella Quality: sharp.   Frequency: intermittent pain is not dependent on time of day. Pain scale: 5/10.   Previous Treatment: nothing Effect of Previous Treatment: N/A  Worsened by: squat, kneeling, stairs (up<down).  Alleviated by: stop activity   General health as reported by patient: excellent  Pertinent medical/surgical history: varicose veins surgery. Imaging: x-ray. Current occupational status: . Current Exercise Regimen: group fitness test walk 1-2x/week, walks 3 miles/day Patient's goals are: decrease pain, participate in fitness classes without pain. Return to MD:  PRN.     KNEE X-RAY IMPRESSION:   Osteocartilaginous ossific bodies along the posterior knee are probably within a Baker's  cyst and measure up to 7 mm    Objective:  KNEE:    Alignment: moderate genu valgus, femoral MR and hyperextension R>L    Squat: hip hinge bias- maintains knee vertical to 90    PROM:   L  R   Extension +5 +10   Flexion 135* 135     Patellar Mobility: pain with lateral and medial glide on the left, +2 quadrant bilaterally    Strength:   L R   HIP     Flex     Abd +3/5 +3/5   Extension     KNEE     Flex 5/5 5/5   Ext 4/5 4/5     Assessment/Plan:    The patient is a 60 year old female with chief complaint of  bilateral knee pain R>L.    The patient has the following significant findings with corresponding treatment plan.  Diagnosis 1:  Primary knee OA    Pain -  manual therapy, self management, education and home program  Decreased ROM/flexibility - manual therapy, therapeutic exercise and home program  Decreased joint mobility - manual therapy, therapeutic exercise and home program  Decreased strength - therapeutic exercise, therapeutic activities and home program  Impaired balance - neuro re-education, therapeutic activities and home program  Decreased proprioception - neuro re-education and therapeutic activities  Impaired gait - gait training and assistive devices  Impaired muscle performance - neuro re-education and home program  Decreased function - therapeutic activities and home program  Impaired posture - neuro re-education, therapeutic activities and home program  Instability -  Therapeutic Activity, Therapeutic Exercise, Neuromuscular Re-education, Splinting/Taping/Bracing/Orthotic, home program    Therapy Evaluation Codes:   1) History comprised of:   Personal factors that impact the plan of care:      Please refer to health history in EMR.    Comorbidity factors that impact the plan of care are:      Please refer to health history in EMR.     Medications impacting care: None.  2) Examination of Body Systems comprised of:   Body structures and functions that impact the plan of care:      Knee.   Activity limitations that impact the plan of care are:      Squatting/kneeling and Stairs.   Clinical presentation characteristics are:    Stable/Uncomplicated.  3) Presentation comprised of:   Presentation scored as Low complexity with uncomplicated characteristics..  4) Decision-Making    Low complexity using standardized patient assessment instrument and/or measureable assessment of functional outcome.  Cumulative Therapy Evaluation is: Low complexity.    Previous and current functional limitations:  (See Goal Flow  Sheet for this information)    Short term and Long term goals: (See Goal Flow Sheet for this information)     Communication ability:  Patient appears to be able to clearly communicate and understand verbal and written communication and follow directions correctly.  Treatment Explanation - The following has been discussed with the patient: RX ordered/plan of care, anticipated outcomes, and possible risks and side effects.  This patient would benefit from PT intervention to resume normal activities.   Rehab potential is good.    Frequency:  1 X week, once daily  Duration:  for 6 weeks  Discharge Plan: Achieve all LTGs, be independent in home treatment program, and reach maximal therapeutic benefit.    Please refer to the daily flowsheet for treatment today, total treatment time and time spent performing 1:1 timed codes.

## 2020-10-16 ENCOUNTER — THERAPY VISIT (OUTPATIENT)
Dept: PHYSICAL THERAPY | Facility: CLINIC | Age: 60
End: 2020-10-16
Payer: COMMERCIAL

## 2020-10-16 DIAGNOSIS — M17.0 PRIMARY OSTEOARTHRITIS OF BOTH KNEES: Primary | ICD-10-CM

## 2020-10-16 PROCEDURE — 97530 THERAPEUTIC ACTIVITIES: CPT | Mod: GP | Performed by: PHYSICAL THERAPIST

## 2020-10-16 PROCEDURE — 97110 THERAPEUTIC EXERCISES: CPT | Mod: GP | Performed by: PHYSICAL THERAPIST

## 2020-10-30 ENCOUNTER — THERAPY VISIT (OUTPATIENT)
Dept: PHYSICAL THERAPY | Facility: CLINIC | Age: 60
End: 2020-10-30
Payer: COMMERCIAL

## 2020-10-30 DIAGNOSIS — M17.0 PRIMARY OSTEOARTHRITIS OF BOTH KNEES: ICD-10-CM

## 2020-10-30 PROCEDURE — 97530 THERAPEUTIC ACTIVITIES: CPT | Mod: GP | Performed by: PHYSICAL THERAPIST

## 2020-10-30 PROCEDURE — 97110 THERAPEUTIC EXERCISES: CPT | Mod: GP | Performed by: PHYSICAL THERAPIST

## 2020-11-09 ENCOUNTER — VIRTUAL VISIT (OUTPATIENT)
Dept: FAMILY MEDICINE | Facility: OTHER | Age: 60
End: 2020-11-09
Payer: COMMERCIAL

## 2020-11-09 PROCEDURE — 99421 OL DIG E/M SVC 5-10 MIN: CPT | Performed by: PHYSICIAN ASSISTANT

## 2020-11-09 NOTE — PROGRESS NOTES
"Date: 2020 13:34:01  Clinician: Alphonse Panda  Clinician NPI: [id number]  Patient: Mary Irizarry  Patient : 1960  Patient Address: [address] [place] [id number]  Patient Phone: [phone]  Visit Protocol: URI  Patient Summary:  Mary is a 60 year old ( : 1960 ) female who initiated a [facility] Visit for COVID-19 (Coronavirus) evaluation and screening. When asked the question \"Please sign me up to receive news, health information and promotions from [facility].\", Mary responded \"Yes\".    When asked when her symptoms started, Mary reported that she does not have any symptoms.   She denies taking antibiotic medication in the past month and having recent facial or sinus surgery in the past 60 days.    Pertinent COVID-19 (Coronavirus) information  Mary does not work or volunteer as healthcare worker or a . In the past 14 days, Mary has worked or volunteered at a healthcare facility or a group living setting. Additional job details as reported by the patient (free text):  at [employer]   In the past 14 days, she has not lived in a congregate living setting.   Mary has had a close contact with a laboratory-confirmed COVID-19 patient in the last 14 days. She was exposed at her work. Date Mary was exposed to the laboratory-confirmed COVID-19 patient: 2020   Additional information about contact with COVID-19 (Coronavirus) patient as reported by the patient (free text): Exposed 2020   Mary is not living in the same household with the COVID-19 positive patient. She was in an enclosed space for greater than 15 minutes with the COVID-19 patient.   During the encounter, both of them were wearing masks.   Since 2019, Mary has not been tested for COVID-19 and has not had upper respiratory infection or influenza-like illness.   Pertinent medical history  Mary typically gets a " yeast infection when she takes antibiotics. She is not sure if she has used fluconazole (Diflucan) to treat previous yeast infections.   Mary needs a return to work/school note.   Weight: 175 lbs   Mary does not smoke or use smokeless tobacco.   Weight: 175 lbs    MEDICATIONS: Zovirax oral, ALLERGIES: Floxin  Clinician Response:  Dear Mary,   Based on your exposure to COVID-19 (coronavirus), we would like to test you for this virus.  1. Please call [phone] to schedule your visit. Explain that you were referred by [facility] to have a COVID-19 test. Be ready to share your [facility] visit ID number.  * If you need to schedule in [facility] please call [phone] or for [employer] employees please call [phone].   * If you need to schedule in the [place] area please call [phone]. [employer] employees call [phone].   The following will serve as your written order for this COVID Test, ordered by me, for the indication of suspected COVID [Z20.828]: The test will be ordered in [facility], our electronic health record, after you are scheduled. It will show as ordered and authorized by Arden Khan MD.  Order: COVID-19 (coronavirus) PCR for ASYMPTOMATIC EXPOSURE testing from [facility].   If you know you have had close contact with someone who tested positive, you should be quarantined for 14 days after this exposure. You should stay in quarantine for the14 days even if the covid test is negative, the optimal time to test after exposure is 5-7 days from the exposure  Quarantine means   What should I do?  For safety, it's very important to follow these rules. Do this for 14 days after the date you were last exposed to the virus..  Stay home and away from others. Don't go to school or anywhere else. Generally quarantine means staying home from work but there are some exceptions to this. Please contact your workplace.   No hugging, kissing or shaking hands.  Don't let anyone visit.  Cover your mouth and nose  with a mask, tissue or washcloth to avoid spreading germs.  Wash your hands and face often. Use soap and water.  What are the symptoms of COVID-19?  The most common symptoms are cough, fever and trouble breathing. Less common symptoms include headache, body aches, fatigue (feeling very tired), chills, sore throat, stuffy or runny nose, diarrhea (loose poop), loss of taste or smell, belly pain, and nausea or vomiting (feeling sick to your stomach or throwing up).  After 14 days, if you have still don't have symptoms, you likely don't have this virus.  If you develop symptoms, follow these guidelines.  If you're normally healthy: Please start another [facility] visit to report your symptoms. Go to [email or web address].  If you have a serious health problem (like cancer, heart failure, an organ transplant or kidney disease): Call your specialty clinic. Let them know that you might have COVID-19.  2. When it's time for your COVID test:  Stay at least 6 feet away from others. (If someone will drive you to your test, stay in the backseat, as far away from the  as you can.)  Cover your mouth and nose with a mask, tissue or washcloth.  Go straight to the testing site. Don't make any stops on the way there or back.  Please note  Caregivers in these groups are at risk for severe illness due to COVID-19:  o People 65 years and older  o People who live in a nursing home or long-term care facility  o People with chronic disease (lung, heart, cancer, diabetes, kidney, liver, immunologic)  o People who have a weakened immune system, including those who:  Are in cancer treatment  Take medicine that weakens the immune system, such as corticosteroids  Had a bone marrow or organ transplant  Have an immune deficiency  Have poorly controlled HIV or AIDS  Are obese (body mass index of 40 or higher)  Smoke regularly  Where can I get more information?   [facility] -- About COVID-19: [email or web address]  [facility] -- What to Do If  You're Sick: www.cdc.gov/coronavirus/2019-ncov/about/steps-when-sick.html  CDC -- Ending Home Isolation: www.cdc.gov/coronavirus/2019-ncov/hcp/disposition-in-home-patients.html  [facility] -- Caring for Someone: www.cdc.gov/coronavirus/2019-ncov/if-you-are-sick/care-for-someone.html  [facility] -- Interim Guidance for Hospital Discharge to Home: www.[facility]mn./diseases/coronavirus/hcp/hospdischarge.pdf  [facility] clinical trials (COVID-19 research studies): clinicalaffairs.[facility]-clinical-trials  Below are the COVID-19 hotlines at the [place] [facility] ([facility]). Interpreters are available.  For health questions: Call 498-253-1249 or 1-534.625.9189 (7 a.m. to 7 p.m.)  For questions about schools and childcare: Call 084-555-9163 or 1-407.734.8187 (7 a.m. to 7 p.m.)    Diagnosis: Contact with and (suspected) exposure to other viral communicable diseases  Diagnosis ICD: Z20.828

## 2020-11-13 ENCOUNTER — THERAPY VISIT (OUTPATIENT)
Dept: PHYSICAL THERAPY | Facility: CLINIC | Age: 60
End: 2020-11-13
Payer: COMMERCIAL

## 2020-11-13 DIAGNOSIS — M17.0 PRIMARY OSTEOARTHRITIS OF BOTH KNEES: ICD-10-CM

## 2020-11-13 PROCEDURE — 97530 THERAPEUTIC ACTIVITIES: CPT | Mod: GP | Performed by: PHYSICAL THERAPIST

## 2020-11-13 PROCEDURE — 97110 THERAPEUTIC EXERCISES: CPT | Mod: GP | Performed by: PHYSICAL THERAPIST

## 2020-11-27 ENCOUNTER — THERAPY VISIT (OUTPATIENT)
Dept: PHYSICAL THERAPY | Facility: CLINIC | Age: 60
End: 2020-11-27
Payer: COMMERCIAL

## 2020-11-27 DIAGNOSIS — M17.0 PRIMARY OSTEOARTHRITIS OF BOTH KNEES: ICD-10-CM

## 2020-11-27 PROCEDURE — 97110 THERAPEUTIC EXERCISES: CPT | Mod: GP | Performed by: PHYSICAL THERAPIST

## 2020-11-27 PROCEDURE — 97530 THERAPEUTIC ACTIVITIES: CPT | Mod: GP | Performed by: PHYSICAL THERAPIST

## 2020-11-27 PROCEDURE — 97112 NEUROMUSCULAR REEDUCATION: CPT | Mod: GP | Performed by: PHYSICAL THERAPIST

## 2020-12-11 ENCOUNTER — THERAPY VISIT (OUTPATIENT)
Dept: PHYSICAL THERAPY | Facility: CLINIC | Age: 60
End: 2020-12-11
Payer: COMMERCIAL

## 2020-12-11 DIAGNOSIS — M17.0 PRIMARY OSTEOARTHRITIS OF BOTH KNEES: ICD-10-CM

## 2020-12-11 PROCEDURE — 97530 THERAPEUTIC ACTIVITIES: CPT | Mod: GP | Performed by: PHYSICAL THERAPIST

## 2020-12-11 PROCEDURE — 97110 THERAPEUTIC EXERCISES: CPT | Mod: GP | Performed by: PHYSICAL THERAPIST

## 2020-12-11 PROCEDURE — 97112 NEUROMUSCULAR REEDUCATION: CPT | Mod: GP | Performed by: PHYSICAL THERAPIST

## 2020-12-11 NOTE — PROGRESS NOTES
PROGRESS  REPORT    Progress reporting period is from 10/9/20 to 12/11/20.       SUBJECTIVE  Subjective changes noted by patient: Patient reports her knees bother her sometimes and other times they don't. She does report that she feels stronger than she did initially. Has continued to use UE support for squats due to increased pain in the knees without support. Patient reports the stairs still increased pain the most.    Current Pain level: 5/10.     Changes in function:  Yes (See Goal flowsheet attached for changes in current functional level)  Adverse reaction to treatment or activity: None    OBJECTIVE  Changes noted in objective findings:  Patient is able to perform double limb squat with light UE support. She is able to ascend and descend stairs reciprocally but requires significant cueing for quad control and correct technique with stairs. Stability with bridge progression has improved since the beginning of therapy.     ASSESSMENT/PLAN  Updated problem list and treatment plan: Diagnosis 1:  Bilateral knee pain  Pain -  self management, education, directional preference exercise and home program  Decreased strength - therapeutic exercise, therapeutic activities and home program  Impaired muscle performance - neuro re-education and home program  Decreased function - therapeutic activities and home program  STG/LTGs have been met or progress has been made towards goals:  Yes (See Goal flow sheet completed today.)  Assessment of Progress: The patient's condition is improving.  Self Management Plans:  Patient has been instructed in a home treatment program.  Patient  has been instructed in self management of symptoms.  I have re-evaluated this patient and find that the nature, scope, duration and intensity of the therapy is appropriate for the medical condition of the patient.  Mary continues to require the following intervention to meet STG and LTG's:  PT    Recommendations:  This patient would benefit from  continued therapy.     Frequency:  2 X a month, once daily  Duration:  for 6 weeks        Please refer to the daily flowsheet for treatment today, total treatment time and time spent performing 1:1 timed codes.

## 2020-12-31 ENCOUNTER — THERAPY VISIT (OUTPATIENT)
Dept: PHYSICAL THERAPY | Facility: CLINIC | Age: 60
End: 2020-12-31
Payer: COMMERCIAL

## 2020-12-31 DIAGNOSIS — M17.0 PRIMARY OSTEOARTHRITIS OF BOTH KNEES: ICD-10-CM

## 2020-12-31 PROCEDURE — 97530 THERAPEUTIC ACTIVITIES: CPT | Mod: GP | Performed by: PHYSICAL THERAPIST

## 2020-12-31 PROCEDURE — 97112 NEUROMUSCULAR REEDUCATION: CPT | Mod: GP | Performed by: PHYSICAL THERAPIST

## 2021-01-15 ENCOUNTER — HEALTH MAINTENANCE LETTER (OUTPATIENT)
Age: 61
End: 2021-01-15

## 2021-02-19 ENCOUNTER — THERAPY VISIT (OUTPATIENT)
Dept: PHYSICAL THERAPY | Facility: CLINIC | Age: 61
End: 2021-02-19
Payer: COMMERCIAL

## 2021-02-19 DIAGNOSIS — M17.0 PRIMARY OSTEOARTHRITIS OF BOTH KNEES: ICD-10-CM

## 2021-02-19 PROCEDURE — 97530 THERAPEUTIC ACTIVITIES: CPT | Mod: GP | Performed by: PHYSICAL THERAPIST

## 2021-02-19 PROCEDURE — 97110 THERAPEUTIC EXERCISES: CPT | Mod: GP | Performed by: PHYSICAL THERAPIST

## 2021-02-19 NOTE — PROGRESS NOTES
"PROGRESS  REPORT    Progress reporting period is from 12/11/20 to 2/19/21.       SUBJECTIVE  Subjective changes noted by patient:  Patient returns to therapy following 7 months away. She reports in the interim that she has tried to advance to the new strengthening exercises (partial lunges, step downs) but these continue to cause inflammation in the knees and then she can't do the exercises for a few weeks. Patient expresses frustration at this time because the pain continues to return.    Current Pain level: 3/10.     Changes in function:  Yes (See Goal flowsheet attached for changes in current functional level)  Adverse reaction to treatment or activity: None    OBJECTIVE  Changes noted in objective findings: Patient is able to perform 6\" step downs with good control, but reports increased pain in the front of the knees with this. Patient emotional throughout treatment session today due to frustrations with the pain still returning even though she feels stronger.     ASSESSMENT/PLAN  Updated problem list and treatment plan: Diagnosis 1:  Bilateral knee pain  Pain -  self management, education, directional preference exercise and home program  Decreased strength - therapeutic exercise, therapeutic activities and home program  Impaired muscle performance - neuro re-education and home program  Decreased function - therapeutic activities and home program  STG/LTGs have been met or progress has been made towards goals:  Yes (See Goal flow sheet completed today.)  Assessment of Progress: The patient's progress has plateaued.  The patient's condition has exacerbated.  Self Management Plans:  Patient has been instructed in a home treatment program.  Patient  has been instructed in self management of symptoms.  I have re-evaluated this patient and find that the nature, scope, duration and intensity of the therapy is appropriate for the medical condition of the patient.  Mary continues to require the following " intervention to meet STG and LTG's:  Return to MD to discuss further interventions.    Recommendations:  This patient would benefit from further evaluation.    Please refer to the daily flowsheet for treatment today, total treatment time and time spent performing 1:1 timed codes.

## 2021-02-22 NOTE — PROGRESS NOTES
"Mary Irizarry  :  1960  DOS: 2021  MRN: [id number]    Sports Medicine Clinic Visit    PCP: Carol Jaeger    Mary Irizarry is a 60 year old female who is seen in follow-up for Dr Catalan presenting with chronic bilateral knee pain.    Injury: 25 year(s).  Pain located over anterior knees, nonradiating.  Additional Features:  Positive: none.  Symptoms are better with Rest and activity avoidance.  Symptoms are worse with: standing to sitting down and stairs.  Other evaluation and/or treatments so far consists of: PT.  Recent imaging completed: X-rays completed 3/13/20.  Prior History of related problems: chronic    Social History: desk job    Review of Systems  Musculoskeletal: as above  Remainder of review of systems is negative including constitutional, CV, pulmonary, GI, Skin and Neurologic except as noted in HPI or medical history.    No past medical history on file.  Past Surgical History:   Procedure Laterality Date     ENT SURGERY      mouth     LASIK      monovision - left is near eye     SOFT TISSUE SURGERY      remove needle from foot     Family History   Problem Relation Age of Onset     Diabetes Father      Glaucoma No family hx of      Macular Degeneration No family hx of      Objective  /83   Pulse 61   Ht 1.791 m (5' 10.5\")   Wt 86.2 kg (190 lb)   BMI 26.88 kg/m        General: healthy, alert and in no distress      HEENT: no scleral icterus or conjunctival erythema     Skin: no suspicious lesions or rash. No jaundice.     CV: regular rhythm by palpation, 2+ distal pulses, no pedal edema      Resp: normal respiratory effort without conversational dyspnea     Psych: normal mood and affect      Gait: antalgic, appropriate coordination and balance     Neuro: normal light touch sensory exam of the extremities. Motor strength as noted below     Bilateral Knee exam    ROM:        Full active and passive ROM with flexion and extension       Pain with terminal " flexion> extension over anterior knees    Inspection:       no visible ecchymosis       no visible edema or effusion    Skin:       no visible deformities       well perfused       capillary refill brisk    Patellar Motion:        Normal patellar tracking noted through range of motion       Lateral tilt noted in patella       Crepitus noted in the patellofemoral joint       Genu valgus noted     Tender:        medial patellar border       lateral patellar border       infrapatellar tendon on the right near insertion to inferior pole of patella    Non Tender:         remainder of knee area    Special Tests:        neg (-) Marlen       neg (-) Lachman       neg (-) anterior drawer       neg (-) posterior drawer       neg (-) varus at 0 deg and 30 deg       neg (-) valgus at 0 deg and 30 deg       Painful PF grind mild R>L    Evaluation of ipsilateral kinetic chain       normal strength with hip extension and abduction       pes planus noted bilaterally      Radiology  XR KNEE STANDING BILATERAL 3 VW 3/13/2020 2:23 PM      HISTORY: Bilateral knee pain; Bilateral knee pain     COMPARISON: None.                                                                       IMPRESSION: There is at least mild to moderate degenerative changes of  the patellofemoral compartment of the right knee. Osteocartilaginous  ossific bodies along the posterior knee are probably within a Baker's  cyst and measure up to 7 mm. No significant joint effusion. No  fracture or dislocation.     Views of left knee show mild patellofemoral compartment degenerative  change. No fracture or dislocation. No significant joint effusion.    Assessment:  1. Chronic pain of both knees    2. Primary osteoarthritis of both knees        Plan:  Discussed the assessment with the patient.  Follow up: prn based on clinical progress  XR images independently visualized and reviewed with patient today in clinic  PF OA changes, R>L on imaging, reasonably consistent with  her sx, R usually worse than the left  Overall pain is relatively mild, has worked hard on PT exercises  Reviewed importance of continuing these, and other low impact and core building strategies  Reviewed wt loss, activity modification and progressive increase in activity as tolerated and guided by pain  Reviewed options for potential steroid vs viscosupplementation injections and the possibility for future orthopedic referral prn  Reviewed safe and appropriate OTC medication choices, try tylenol first  Up to 3000mg daily of tylenol is generally safe, NSAID dosing and duration limitations reviewed, topical Voltaren Gel is safe  Discussed nature of degenerative arthrosis of the knee.   Discussed symptom treatment with ice or heat, topical treatments, and rest if needed.   Pre-approval sent for visco trial, since she has completed PT and tried OTC medications without relief, and documented OA on XR, this is a safe and reasonable next step  We discussed modified progressive pain-free activity as tolerated  Home handouts provided and supportive care reviewed  All questions were answered today  Contact us with additional questions or concerns  Signs and sx of concern reviewed      Scotty Edmondson DO, CAQ  Primary Care Sports Medicine  [facility]       Time spent in chart review, one-on-one evaluation, discussion with patient regarding nature of problem, course, prior treatments, and therapeutic options, share-decision making, procedures and referrals as appropriate/documented, and charting related to the visit: 35 minutes              Disclaimer: This note consists of symbols derived from keyboarding, dictation and/or voice recognition software. As a result, there may be errors in the script that have gone undetected. Please consider this when interpreting information found in this chart.

## 2021-02-26 ENCOUNTER — OFFICE VISIT (OUTPATIENT)
Dept: ORTHOPEDICS | Facility: CLINIC | Age: 61
End: 2021-02-26
Payer: COMMERCIAL

## 2021-02-26 ENCOUNTER — APPOINTMENT (OUTPATIENT)
Dept: URBAN - METROPOLITAN AREA CLINIC 252 | Age: 61
Setting detail: DERMATOLOGY
End: 2021-02-26

## 2021-02-26 VITALS — WEIGHT: 180 LBS | HEIGHT: 71 IN | RESPIRATION RATE: 16 BRPM

## 2021-02-26 VITALS
BODY MASS INDEX: 26.6 KG/M2 | DIASTOLIC BLOOD PRESSURE: 83 MMHG | WEIGHT: 190 LBS | HEIGHT: 71 IN | HEART RATE: 61 BPM | SYSTOLIC BLOOD PRESSURE: 120 MMHG

## 2021-02-26 DIAGNOSIS — G89.29 CHRONIC PAIN OF BOTH KNEES: Primary | ICD-10-CM

## 2021-02-26 DIAGNOSIS — L82.1 OTHER SEBORRHEIC KERATOSIS: ICD-10-CM

## 2021-02-26 DIAGNOSIS — M25.561 CHRONIC PAIN OF BOTH KNEES: Primary | ICD-10-CM

## 2021-02-26 DIAGNOSIS — M25.562 CHRONIC PAIN OF BOTH KNEES: Primary | ICD-10-CM

## 2021-02-26 DIAGNOSIS — L85.1 ACQUIRED KERATOSIS [KERATODERMA] PALMARIS ET PLANTARIS: ICD-10-CM

## 2021-02-26 DIAGNOSIS — M17.0 PRIMARY OSTEOARTHRITIS OF BOTH KNEES: ICD-10-CM

## 2021-02-26 DIAGNOSIS — L81.4 OTHER MELANIN HYPERPIGMENTATION: ICD-10-CM

## 2021-02-26 PROCEDURE — 99214 OFFICE O/P EST MOD 30 MIN: CPT | Performed by: FAMILY MEDICINE

## 2021-02-26 PROCEDURE — OTHER COUNSELING: OTHER

## 2021-02-26 PROCEDURE — OTHER ADDITIONAL NOTES: OTHER

## 2021-02-26 PROCEDURE — 99213 OFFICE O/P EST LOW 20 MIN: CPT

## 2021-02-26 PROCEDURE — OTHER PRESCRIPTION: OTHER

## 2021-02-26 RX ORDER — FLUOROURACIL 40 MG/G
4% CREAM TOPICAL BID
Qty: 40 | Refills: 2 | Status: ERX | COMMUNITY
Start: 2021-02-26

## 2021-02-26 ASSESSMENT — LOCATION DETAILED DESCRIPTION DERM
LOCATION DETAILED: RIGHT PLANTAR FOREFOOT OVERLYING 1ST METATARSAL
LOCATION DETAILED: LEFT DISTAL DORSAL FOREARM
LOCATION DETAILED: RIGHT CLAVICULAR NECK
LOCATION DETAILED: RIGHT VENTRAL PROXIMAL FOREARM
LOCATION DETAILED: RIGHT DISTAL DORSAL FOREARM
LOCATION DETAILED: RIGHT PROXIMAL DORSAL FOREARM
LOCATION DETAILED: LEFT SUPERIOR UPPER BACK
LOCATION DETAILED: LEFT VENTRAL PROXIMAL FOREARM
LOCATION DETAILED: LEFT PROXIMAL DORSAL FOREARM
LOCATION DETAILED: RIGHT SUPERIOR LATERAL UPPER BACK
LOCATION DETAILED: LEFT CLAVICULAR SKIN
LOCATION DETAILED: RIGHT LATERAL TRAPEZIAL NECK

## 2021-02-26 ASSESSMENT — LOCATION SIMPLE DESCRIPTION DERM
LOCATION SIMPLE: RIGHT PLANTAR SURFACE
LOCATION SIMPLE: RIGHT FOREARM
LOCATION SIMPLE: POSTERIOR NECK
LOCATION SIMPLE: LEFT UPPER BACK
LOCATION SIMPLE: RIGHT ANTERIOR NECK
LOCATION SIMPLE: RIGHT BACK
LOCATION SIMPLE: LEFT FOREARM
LOCATION SIMPLE: LEFT CLAVICULAR SKIN

## 2021-02-26 ASSESSMENT — LOCATION ZONE DERM
LOCATION ZONE: TRUNK
LOCATION ZONE: NECK
LOCATION ZONE: ARM
LOCATION ZONE: FEET

## 2021-02-26 ASSESSMENT — MIFFLIN-ST. JEOR: SCORE: 1525.02

## 2021-02-26 NOTE — LETTER
"    2021         RE: Mary Irizarry  [address] [place] [facility] [address]        Dear Colleague,    Thank you for referring your patient, Mary Irizarry, to the [facility]. Please see a copy of my visit note below.    Mary Irizarry  :  1960  DOS: 2021  MRN: [id number]    Sports Medicine Clinic Visit    PCP: Carol Jaeger    Mary Irizarry is a 60 year old female who is seen in follow-up for Dr Catalan presenting with chronic bilateral knee pain.    Injury: 25 year(s).  Pain located over anterior knees, nonradiating.  Additional Features:  Positive: none.  Symptoms are better with Rest and activity avoidance.  Symptoms are worse with: standing to sitting down and stairs.  Other evaluation and/or treatments so far consists of: PT.  Recent imaging completed: X-rays completed 3/13/20.  Prior History of related problems: chronic    Social History: desk job    Review of Systems  Musculoskeletal: as above  Remainder of review of systems is negative including constitutional, CV, pulmonary, GI, Skin and Neurologic except as noted in HPI or medical history.    No past medical history on file.  Past Surgical History:   Procedure Laterality Date     ENT SURGERY      mouth     LASIK      monovision - left is near eye     SOFT TISSUE SURGERY      remove needle from foot     Family History   Problem Relation Age of Onset     Diabetes Father      Glaucoma No family hx of      Macular Degeneration No family hx of      Objective  /83   Pulse 61   Ht 1.791 m (5' 10.5\")   Wt 86.2 kg (190 lb)   BMI 26.88 kg/m        General: healthy, alert and in no distress      HEENT: no scleral icterus or conjunctival erythema     Skin: no suspicious lesions or rash. No jaundice.     CV: regular rhythm by palpation, 2+ distal pulses, no pedal edema      Resp: normal respiratory effort without conversational dyspnea     Psych: normal mood and affect  "     Gait: antalgic, appropriate coordination and balance     Neuro: normal light touch sensory exam of the extremities. Motor strength as noted below     Bilateral Knee exam    ROM:        Full active and passive ROM with flexion and extension       Pain with terminal flexion> extension over anterior knees    Inspection:       no visible ecchymosis       no visible edema or effusion    Skin:       no visible deformities       well perfused       capillary refill brisk    Patellar Motion:        Normal patellar tracking noted through range of motion       Lateral tilt noted in patella       Crepitus noted in the patellofemoral joint       Genu valgus noted     Tender:        medial patellar border       lateral patellar border       infrapatellar tendon on the right near insertion to inferior pole of patella    Non Tender:         remainder of knee area    Special Tests:        neg (-) Marlen       neg (-) Lachman       neg (-) anterior drawer       neg (-) posterior drawer       neg (-) varus at 0 deg and 30 deg       neg (-) valgus at 0 deg and 30 deg       Painful PF grind mild R>L    Evaluation of ipsilateral kinetic chain       normal strength with hip extension and abduction       pes planus noted bilaterally      Radiology  XR KNEE STANDING BILATERAL 3 VW 3/13/2020 2:23 PM      HISTORY: Bilateral knee pain; Bilateral knee pain     COMPARISON: None.                                                                       IMPRESSION: There is at least mild to moderate degenerative changes of  the patellofemoral compartment of the right knee. Osteocartilaginous  ossific bodies along the posterior knee are probably within a Baker's  cyst and measure up to 7 mm. No significant joint effusion. No  fracture or dislocation.     Views of left knee show mild patellofemoral compartment degenerative  change. No fracture or dislocation. No significant joint effusion.    Assessment:  1. Chronic pain of both knees    2.  Primary osteoarthritis of both knees        Plan:  Discussed the assessment with the patient.  Follow up: prn based on clinical progress  XR images independently visualized and reviewed with patient today in clinic  PF OA changes, R>L on imaging, reasonably consistent with her sx, R usually worse than the left  Overall pain is relatively mild, has worked hard on PT exercises  Reviewed importance of continuing these, and other low impact and core building strategies  Reviewed wt loss, activity modification and progressive increase in activity as tolerated and guided by pain  Reviewed options for potential steroid vs viscosupplementation injections and the possibility for future orthopedic referral prn  Reviewed safe and appropriate OTC medication choices, try tylenol first  Up to 3000mg daily of tylenol is generally safe, NSAID dosing and duration limitations reviewed, topical Voltaren Gel is safe  Discussed nature of degenerative arthrosis of the knee.   Discussed symptom treatment with ice or heat, topical treatments, and rest if needed.   Pre-approval sent for visco trial, since she has completed PT and tried OTC medications without relief, and documented OA on XR, this is a safe and reasonable next step  We discussed modified progressive pain-free activity as tolerated  Home handouts provided and supportive care reviewed  All questions were answered today  Contact us with additional questions or concerns  Signs and sx of concern reviewed      Scotty Edmondson DO, CAKIERSTEN  Primary Care Sports Medicine  [facility]       Time spent in chart review, one-on-one evaluation, discussion with patient regarding nature of problem, course, prior treatments, and therapeutic options, share-decision making, procedures and referrals as appropriate/documented, and charting related to the visit: 35 minutes              Disclaimer: This note consists of symbols derived from keyboarding, dictation and/or voice  recognition software. As a result, there may be errors in the script that have gone undetected. Please consider this when interpreting information found in this chart.      Again, thank you for allowing me to participate in the care of your patient.        Sincerely,        Scotty Edmondson, DO

## 2021-02-26 NOTE — PROCEDURE: ADDITIONAL NOTES
Additional Notes: Eucerin rough spot treatment samples provided.
Detail Level: Detailed
Render Risk Assessment In Note?: no

## 2021-02-26 NOTE — PROCEDURE: COUNSELING
Detail Level: Simple
Patient Specific Counseling (Will Not Stick From Patient To Patient): Patient reports her fried used fluorouracil to treat spots like this. Advised these can sometimes respond to fluorouracil but is off label and cannot be guaranteed to work. Discussed expectations in extensive detail.

## 2021-02-26 NOTE — HPI: RASH
How Severe Is Your Rash?: mild
Is This A New Presentation, Or A Follow-Up?: Rash
Additional History: Has used clotrimazole and did not help.

## 2021-03-01 ENCOUNTER — RX ONLY (RX ONLY)
Age: 61
End: 2021-03-01

## 2021-03-01 RX ORDER — FLUOROURACIL 5 MG/G
CREAM TOPICAL
Qty: 40 | Refills: 3 | Status: ERX | COMMUNITY
Start: 2021-03-01

## 2021-03-17 DIAGNOSIS — J30.2 SEASONAL ALLERGIES: ICD-10-CM

## 2021-03-17 DIAGNOSIS — B00.9 HERPES SIMPLEX VIRUS INFECTION: ICD-10-CM

## 2021-03-19 RX ORDER — ACYCLOVIR 400 MG/1
TABLET ORAL
Qty: 30 TABLET | Refills: 0 | Status: SHIPPED | OUTPATIENT
Start: 2021-03-19 | End: 2021-04-09

## 2021-03-19 RX ORDER — CETIRIZINE HYDROCHLORIDE 10 MG/1
10 TABLET ORAL DAILY
Qty: 30 TABLET | Refills: 0 | Status: SHIPPED | OUTPATIENT
Start: 2021-03-19 | End: 2021-04-26

## 2021-03-21 ENCOUNTER — HEALTH MAINTENANCE LETTER (OUTPATIENT)
Age: 61
End: 2021-03-21

## 2021-04-09 DIAGNOSIS — B35.3 TINEA PEDIS OF BOTH FEET: ICD-10-CM

## 2021-04-09 DIAGNOSIS — B00.9 HERPES SIMPLEX VIRUS INFECTION: ICD-10-CM

## 2021-04-09 RX ORDER — ACYCLOVIR 400 MG/1
TABLET ORAL
Qty: 30 TABLET | Refills: 0 | Status: SHIPPED | OUTPATIENT
Start: 2021-04-09 | End: 2021-05-16

## 2021-04-09 RX ORDER — KETOCONAZOLE 20 MG/G
CREAM TOPICAL
Qty: 60 G | Refills: 0 | Status: SHIPPED | OUTPATIENT
Start: 2021-04-09 | End: 2021-06-22

## 2021-04-09 NOTE — TELEPHONE ENCOUNTER
Routing refill request to provider for review/approval because:  Ny given x1 and patient did not follow up, please advise  Wendy Albrecht BSN, RN

## 2021-04-14 ENCOUNTER — OFFICE VISIT (OUTPATIENT)
Dept: ORTHOPEDICS | Facility: CLINIC | Age: 61
End: 2021-04-14
Payer: COMMERCIAL

## 2021-04-14 VITALS — WEIGHT: 191 LBS | BODY MASS INDEX: 26.74 KG/M2 | HEIGHT: 71 IN

## 2021-04-14 DIAGNOSIS — M25.562 CHRONIC PAIN OF BOTH KNEES: Primary | ICD-10-CM

## 2021-04-14 DIAGNOSIS — G89.29 CHRONIC PAIN OF BOTH KNEES: Primary | ICD-10-CM

## 2021-04-14 DIAGNOSIS — M25.561 CHRONIC PAIN OF BOTH KNEES: Primary | ICD-10-CM

## 2021-04-14 DIAGNOSIS — M17.0 PRIMARY OSTEOARTHRITIS OF BOTH KNEES: ICD-10-CM

## 2021-04-14 PROCEDURE — 20611 DRAIN/INJ JOINT/BURSA W/US: CPT | Mod: 50 | Performed by: FAMILY MEDICINE

## 2021-04-14 ASSESSMENT — MIFFLIN-ST. JEOR: SCORE: 1524.56

## 2021-04-14 NOTE — LETTER
2021         RE: Mary Irizarry  [address] [facility] [address]        Dear Colleague,    Thank you for referring your patient, Mary Irizarry, to the [facility]. Please see a copy of my visit note below.    Mary Irizarry  :  1960  DOS: 2021  MRN: [id number]    Sports Medicine Clinic Procedure    Ultrasound Guided Bilateral Intra-Articular Knee SynviscOne Injection, +/- Aspiration    Clinical History: Gradual onset of chronic waxing and waning bilateral knee pain over past 25+ years.  Noting only mild relief with physical therapy.    Diagnosis:   1. Chronic pain of both knees    2. Primary osteoarthritis of both knees      Referring Physician: Alfred Catalan MD  Large Joint Injection/Arthocentesis: bilateral knee    Date/Time: 2021 3:40 PM  Performed by: Scotty Edmondson DO  Authorized by: Scotty Edmondson DO     Indications:  Pain and osteoarthritis  Needle Size:  22 G  Guidance: ultrasound    Approach:  Superolateral  Location:  Knee  Laterality:  Bilateral      Medications (Right):  48 mg hylan 48 MG/6ML  Medications (Left):  48 mg hylan 48 MG/6ML  Outcome:  Tolerated well, no immediate complications  Procedure discussed: discussed risks, benefits, and alternatives    Consent Given by:  Patient  Timeout: timeout called immediately prior to procedure    Prep: patient was prepped and draped in usual sterile fashion          Impression:  Successful Bilateral intra-articular knee injection.    Plan:  Follow up prn based on clinical progress, especially if no benefit after 4-6 weeks  Expectations and goals of CSI reviewed  Often 2-3 days for steroid effect, and can take up to two weeks for maximum effect  We discussed modified progressive pain-free activity as tolerated  Do not overuse in first two weeks if feeling better due to concern for vulnerability while steroid is working  Supportive care reviewed  All  questions were answered today  Contact us with additional questions or concerns  Signs and sx of concern reviewed      Scotty Edmondson DO, CAQ  Primary Care Sports Medicine  [facility]           Again, thank you for allowing me to participate in the care of your patient.        Sincerely,        Scotty Edmondson DO

## 2021-04-14 NOTE — PROGRESS NOTES
Mary Irizarry  :  1960  DOS: 2021  MRN: [id number]    Sports Medicine Clinic Procedure    Ultrasound Guided Bilateral Intra-Articular Knee SynviscOne Injection, +/- Aspiration    Clinical History: Gradual onset of chronic waxing and waning bilateral knee pain over past 25+ years.  Noting only mild relief with physical therapy.    Diagnosis:   1. Chronic pain of both knees    2. Primary osteoarthritis of both knees      Referring Physician: Alfred Catalan MD  Large Joint Injection/Arthocentesis: bilateral knee    Date/Time: 2021 3:40 PM  Performed by: Scotty Edmondson DO  Authorized by: Scotty Edmondson DO     Indications:  Pain and osteoarthritis  Needle Size:  22 G  Guidance: ultrasound    Approach:  Superolateral  Location:  Knee  Laterality:  Bilateral      Medications (Right):  48 mg hylan 48 MG/6ML  Medications (Left):  48 mg hylan 48 MG/6ML  Outcome:  Tolerated well, no immediate complications  Procedure discussed: discussed risks, benefits, and alternatives    Consent Given by:  Patient  Timeout: timeout called immediately prior to procedure    Prep: patient was prepped and draped in usual sterile fashion          Impression:  Successful Bilateral intra-articular knee injection.    Plan:  Follow up prn based on clinical progress, especially if no benefit after 4-6 weeks  Expectations and goals of CSI reviewed  Often 2-3 days for steroid effect, and can take up to two weeks for maximum effect  We discussed modified progressive pain-free activity as tolerated  Do not overuse in first two weeks if feeling better due to concern for vulnerability while steroid is working  Supportive care reviewed  All questions were answered today  Contact us with additional questions or concerns  Signs and sx of concern reviewed      Scotty Edmondson DO, CAKIERSTEN  Primary Care Sports Medicine  [facility]

## 2021-04-26 DIAGNOSIS — J30.2 SEASONAL ALLERGIES: ICD-10-CM

## 2021-04-26 RX ORDER — CETIRIZINE HYDROCHLORIDE 10 MG/1
10 TABLET ORAL DAILY
Qty: 30 TABLET | Refills: 0 | Status: SHIPPED | OUTPATIENT
Start: 2021-04-26 | End: 2021-05-16

## 2021-05-16 ENCOUNTER — HEALTH MAINTENANCE LETTER (OUTPATIENT)
Age: 61
End: 2021-05-16

## 2021-05-17 ENCOUNTER — VIRTUAL VISIT (OUTPATIENT)
Dept: FAMILY MEDICINE | Facility: CLINIC | Age: 61
End: 2021-05-17
Payer: COMMERCIAL

## 2021-05-17 DIAGNOSIS — B00.9 HERPES SIMPLEX VIRUS INFECTION: ICD-10-CM

## 2021-05-17 DIAGNOSIS — J30.2 SEASONAL ALLERGIES: ICD-10-CM

## 2021-05-17 DIAGNOSIS — B35.3 TINEA PEDIS OF BOTH FEET: ICD-10-CM

## 2021-05-17 DIAGNOSIS — G47.00 INSOMNIA, UNSPECIFIED TYPE: ICD-10-CM

## 2021-05-17 DIAGNOSIS — Z12.31 ENCOUNTER FOR SCREENING MAMMOGRAM FOR BREAST CANCER: Primary | ICD-10-CM

## 2021-05-17 PROCEDURE — 99214 OFFICE O/P EST MOD 30 MIN: CPT | Mod: 95 | Performed by: FAMILY MEDICINE

## 2021-05-17 RX ORDER — CETIRIZINE HYDROCHLORIDE 10 MG/1
10 TABLET ORAL DAILY
Qty: 30 TABLET | Refills: 11 | Status: SHIPPED | OUTPATIENT
Start: 2021-05-17 | End: 2022-05-23

## 2021-05-17 RX ORDER — ZOLPIDEM TARTRATE 5 MG/1
5 TABLET ORAL
Qty: 30 TABLET | Refills: 1 | Status: SHIPPED | OUTPATIENT
Start: 2021-05-17 | End: 2022-07-26

## 2021-05-17 RX ORDER — ACYCLOVIR 400 MG/1
400 TABLET ORAL DAILY
Qty: 40 TABLET | Refills: 11 | Status: SHIPPED | OUTPATIENT
Start: 2021-05-17 | End: 2022-05-23

## 2021-05-17 RX ORDER — CALCIUM CARB/VITAMIN D3/VIT K1 500-100-40
TABLET,CHEWABLE ORAL 2 TIMES DAILY
Qty: 138 G | Refills: 4 | Status: SHIPPED | OUTPATIENT
Start: 2021-05-17

## 2021-05-17 NOTE — PROGRESS NOTES
"Mary is a 61 year old who is being evaluated via a billable video visit.      How would you like to obtain your AVS? MyChart  If the video visit is dropped, the invitation should be resent by: Text to cell phone: [phone]  Will anyone else be joining your video visit? No    Video Start Time: 7:09 AM    Assessment & Plan     Herpes simplex virus infection  Refill, use extra as needed for outbreaks  - acyclovir (ZOVIRAX) 400 MG tablet; Take 1 tablet (400 mg) by mouth daily May take 1 tablet tid for 5 days with outbreak    Seasonal allergies  Use as needed  - cetirizine (ZYRTEC) 10 MG tablet; Take 1 tablet (10 mg) by mouth daily Needs to be seen for further refills    Insomnia, unspecified type  Use sparingly  - zolpidem (AMBIEN) 5 MG tablet; Take 1 tablet (5 mg) by mouth nightly as needed for sleep    Tinea pedis of both feet  Recommend to keep feet dry, use as needed  - tolnaftate (LAMISIL) 1 % external spray; Externally apply topically 2 times daily To feet as needed    Encounter for screening mammogram for breast cancer  Pt to schedule  - *MA Screening Digital Bilateral; Future             BMI:   Estimated body mass index is 27.02 kg/m  as calculated from the following:    Height as of 4/14/21: 1.791 m (5' 10.5\").    Weight as of 4/14/21: 86.6 kg (191 lb).   Weight management plan: Discussed healthy diet and exercise guidelines        No follow-ups on file.    Carol Jaeger MD  [facility]   Mary is a 61 year old who presents for the following health issues     HPI     Medication Followup of acyclovir, cetrizine, and ambien    Taking Medication as prescribed: yes    Side Effects:  None    Medication Helping Symptoms:  yes     Pt needing refill of acyclovir    Review of Systems   Constitutional, HEENT, cardiovascular, pulmonary, gi and gu systems are negative, except as otherwise noted.      Objective           Vitals:  No vitals were obtained today due to " virtual visit.    Physical Exam   GENERAL: Healthy, alert and no distress  EYES: Eyes grossly normal to inspection.  No discharge or erythema, or obvious scleral/conjunctival abnormalities.  RESP: No audible wheeze, cough, or visible cyanosis.  No visible retractions or increased work of breathing.    SKIN: Visible skin clear. No significant rash, abnormal pigmentation or lesions.  NEURO: Cranial nerves grossly intact.  Mentation and speech appropriate for age.  PSYCH: Mentation appears normal, affect normal/bright, judgement and insight intact, normal speech and appearance well-groomed.    No results found for this or any previous visit (from the past 24 hour(s)).    Pt with herpes and outbreaks can occur in up to 3 areas  She takes one tablet daily but needs a few extra for flare ups  It is working well    Pt with allergies, taking zyrtec mark 'needs refill    Pt with athletes feet. Is using ketaconazole and it is working mostly  Her feet sweat a lot and she tried to keep them dry.   Will try to see if lamisil works better    Pt with intermittent insomnia. Needs refill of meds        Video-Visit Details    Type of service:  Video Visit    Video End Time:7:24 AM    Originating Location (pt. Location): Home    Distant Location (provider location):  [facility]     Platform used for Video Visit: [employer]

## 2021-05-19 ENCOUNTER — TELEPHONE (OUTPATIENT)
Dept: FAMILY MEDICINE | Facility: CLINIC | Age: 61
End: 2021-05-19

## 2021-05-25 ENCOUNTER — TELEPHONE (OUTPATIENT)
Dept: FAMILY MEDICINE | Facility: CLINIC | Age: 61
End: 2021-05-25

## 2021-05-25 DIAGNOSIS — B35.3 TINEA PEDIS OF BOTH FEET: ICD-10-CM

## 2021-05-25 RX ORDER — PRENATAL VIT 91/IRON/FOLIC/DHA 28-975-200
COMBINATION PACKAGE (EA) ORAL 2 TIMES DAILY
Qty: 42 G | Refills: 1 | Status: SHIPPED | OUTPATIENT
Start: 2021-05-25 | End: 2022-07-26

## 2021-05-25 NOTE — TELEPHONE ENCOUNTER
Pt contacted RN regarding 5/17/21 orders.    Pt states the tolnaftate (LAMISIL) 1 % external spray is visibly unattractive due to the visible powder appearance on the feet.    Pt requests an order for antifungal cream, instead of spray, for the Tinea pedis be sent to pharmacy instead.    MITUL BelleN, RN

## 2021-06-01 NOTE — PROGRESS NOTES
"Mary Irizarry  :  1960  DOS: 21  MRN: [id number]    Sports Medicine Clinic Visit    PCP: Carol Jaeger    Mary Irizarry is a 60 year old female who is seen in follow-up for Dr Catalan presenting with chronic bilateral knee pain.    Injury: 25 year(s).  Pain located over anterior knees, nonradiating.  Additional Features:  Positive: none.  Symptoms are better with Rest and activity avoidance.  Symptoms are worse with: standing to sitting down and stairs.  Other evaluation and/or treatments so far consists of: PT.  Recent imaging completed: X-rays completed 3/13/20.  Prior History of related problems: chronic    Social History: desk job    Interim History - 2021  Since last visit on 2021 patient has continued to have pain in both knees .  Bilateral knee SynviscOne injection completed on 21 provided no relief.   No new injury in the interim.    Review of Systems  Musculoskeletal: as above  Remainder of review of systems is negative including constitutional, CV, pulmonary, GI, Skin and Neurologic except as noted in HPI or medical history.    No past medical history on file.  Past Surgical History:   Procedure Laterality Date     ENT SURGERY      mouth     LASIK      monovision - left is near eye     SOFT TISSUE SURGERY      remove needle from foot     Family History   Problem Relation Age of Onset     Diabetes Father      Glaucoma No family hx of      Macular Degeneration No family hx of      Objective  /82   Ht 1.791 m (5' 10.5\")   Wt 86.2 kg (190 lb)   BMI 26.88 kg/m        General: healthy, alert and in no distress      HEENT: no scleral icterus or conjunctival erythema     Skin: no suspicious lesions or rash. No jaundice.     CV: regular rhythm by palpation, 2+ distal pulses, no pedal edema      Resp: normal respiratory effort without conversational dyspnea     Psych: normal mood and affect      Gait: antalgic, appropriate coordination and balance "     Neuro: normal light touch sensory exam of the extremities. Motor strength as noted below     Bilateral Knee exam - similar to previous    ROM:        Full active and passive ROM with flexion and extension       Pain with terminal flexion> extension over anterior knees    Inspection:       no visible ecchymosis       no visible edema or effusion    Skin:       no visible deformities       well perfused       capillary refill brisk    Patellar Motion:        Normal patellar tracking noted through range of motion       Lateral tilt noted in patella       Crepitus noted in the patellofemoral joint       Genu valgus noted     Tender:        medial patellar border       lateral patellar border       infrapatellar tendon on the right near insertion to inferior pole of patella    Non Tender:         remainder of knee area    Special Tests:        neg (-) Marlen       neg (-) Lachman       neg (-) anterior drawer       neg (-) posterior drawer       neg (-) varus at 0 deg and 30 deg       neg (-) valgus at 0 deg and 30 deg       Painful PF grind mild R>L    Evaluation of ipsilateral kinetic chain       normal strength with hip extension and abduction       pes planus noted bilaterally      Radiology  XR KNEE STANDING BILATERAL 3 VW 3/13/2020 2:23 PM      HISTORY: Bilateral knee pain; Bilateral knee pain     COMPARISON: None.                                                                       IMPRESSION: There is at least mild to moderate degenerative changes of  the patellofemoral compartment of the right knee. Osteocartilaginous  ossific bodies along the posterior knee are probably within a Baker's  cyst and measure up to 7 mm. No significant joint effusion. No  fracture or dislocation.     Views of left knee show mild patellofemoral compartment degenerative  change. No fracture or dislocation. No significant joint effusion.      Assessment:  1. Primary osteoarthritis of both knees    2. Chronic pain of both knees         Plan:  Discussed the assessment with the patient.  Follow up: prn based on clinical progress  XR images independently visualized and reviewed with patient today in clinic  PF OA changes, R>L on imaging, reasonably consistent with her sx, R usually worse than the left  Overall pain is relatively mild, has worked hard on PT exercises  Reviewed importance of continuing these, and other low impact and core building strategies  Reviewed options for steroid injection for severe flares of pain and the possibility for future orthopedic referral prn  Reviewed safe and appropriate OTC medication choices, try tylenol first  Up to 3000mg daily of tylenol is generally safe, topical Voltaren Gel is also safe  Discussed nature of degenerative arthrosis of the knee.   Discussed symptom treatment with ice or heat, topical treatments, and rest if needed.   We discussed modified progressive pain-free activity as tolerated  Supportive care reviewed  All questions were answered today  Contact us with additional questions or concerns  Signs and sx of concern reviewed      Scotty Edmondson DO, SARAH  Primary Care Sports Medicine  [facility]                   Disclaimer: This note consists of symbols derived from keyboarding, dictation and/or voice recognition software. As a result, there may be errors in the script that have gone undetected. Please consider this when interpreting information found in this chart.

## 2021-06-02 ENCOUNTER — OFFICE VISIT (OUTPATIENT)
Dept: ORTHOPEDICS | Facility: CLINIC | Age: 61
End: 2021-06-02
Payer: COMMERCIAL

## 2021-06-02 VITALS
HEIGHT: 71 IN | BODY MASS INDEX: 26.6 KG/M2 | WEIGHT: 190 LBS | DIASTOLIC BLOOD PRESSURE: 82 MMHG | SYSTOLIC BLOOD PRESSURE: 120 MMHG

## 2021-06-02 DIAGNOSIS — M25.561 CHRONIC PAIN OF BOTH KNEES: ICD-10-CM

## 2021-06-02 DIAGNOSIS — M17.0 PRIMARY OSTEOARTHRITIS OF BOTH KNEES: Primary | ICD-10-CM

## 2021-06-02 DIAGNOSIS — M25.562 CHRONIC PAIN OF BOTH KNEES: ICD-10-CM

## 2021-06-02 DIAGNOSIS — G89.29 CHRONIC PAIN OF BOTH KNEES: ICD-10-CM

## 2021-06-02 PROCEDURE — 99213 OFFICE O/P EST LOW 20 MIN: CPT | Performed by: FAMILY MEDICINE

## 2021-06-02 ASSESSMENT — MIFFLIN-ST. JEOR: SCORE: 1520.02

## 2021-06-02 NOTE — LETTER
"    2021         RE: Mary Irizarry  [address] [facility] [address]        Dear Colleague,    Thank you for referring your patient, Mary Irizarry, to the [facility]. Please see a copy of my visit note below.    Mary Irizaryr  :  1960  DOS: 21  MRN: [id number]    Sports Medicine Clinic Visit    PCP: Carol Jaeger    Mary Irizarry is a 60 year old female who is seen in follow-up for Dr Catalan presenting with chronic bilateral knee pain.    Injury: 25 year(s).  Pain located over anterior knees, nonradiating.  Additional Features:  Positive: none.  Symptoms are better with Rest and activity avoidance.  Symptoms are worse with: standing to sitting down and stairs.  Other evaluation and/or treatments so far consists of: PT.  Recent imaging completed: X-rays completed 3/13/20.  Prior History of related problems: chronic    Social History: desk job    Interim History - 2021  Since last visit on 2021 patient has continued to have pain in both knees .  Bilateral knee SynviscOne injection completed on 21 provided no relief.   No new injury in the interim.    Review of Systems  Musculoskeletal: as above  Remainder of review of systems is negative including constitutional, CV, pulmonary, GI, Skin and Neurologic except as noted in HPI or medical history.    No past medical history on file.  Past Surgical History:   Procedure Laterality Date     ENT SURGERY      mouth     LASIK      monovision - left is near eye     SOFT TISSUE SURGERY      remove needle from foot     Family History   Problem Relation Age of Onset     Diabetes Father      Glaucoma No family hx of      Macular Degeneration No family hx of      Objective  /82   Ht 1.791 m (5' 10.5\")   Wt 86.2 kg (190 lb)   BMI 26.88 kg/m        General: healthy, alert and in no distress      HEENT: no scleral icterus or conjunctival erythema     Skin: no " suspicious lesions or rash. No jaundice.     CV: regular rhythm by palpation, 2+ distal pulses, no pedal edema      Resp: normal respiratory effort without conversational dyspnea     Psych: normal mood and affect      Gait: antalgic, appropriate coordination and balance     Neuro: normal light touch sensory exam of the extremities. Motor strength as noted below     Bilateral Knee exam - similar to previous    ROM:        Full active and passive ROM with flexion and extension       Pain with terminal flexion> extension over anterior knees    Inspection:       no visible ecchymosis       no visible edema or effusion    Skin:       no visible deformities       well perfused       capillary refill brisk    Patellar Motion:        Normal patellar tracking noted through range of motion       Lateral tilt noted in patella       Crepitus noted in the patellofemoral joint       Genu valgus noted     Tender:        medial patellar border       lateral patellar border       infrapatellar tendon on the right near insertion to inferior pole of patella    Non Tender:         remainder of knee area    Special Tests:        neg (-) Marlen       neg (-) Lachman       neg (-) anterior drawer       neg (-) posterior drawer       neg (-) varus at 0 deg and 30 deg       neg (-) valgus at 0 deg and 30 deg       Painful PF grind mild R>L    Evaluation of ipsilateral kinetic chain       normal strength with hip extension and abduction       pes planus noted bilaterally      Radiology  XR KNEE STANDING BILATERAL 3 VW 3/13/2020 2:23 PM      HISTORY: Bilateral knee pain; Bilateral knee pain     COMPARISON: None.                                                                       IMPRESSION: There is at least mild to moderate degenerative changes of  the patellofemoral compartment of the right knee. Osteocartilaginous  ossific bodies along the posterior knee are probably within a Baker's  cyst and measure up to 7 mm. No significant joint  effusion. No  fracture or dislocation.     Views of left knee show mild patellofemoral compartment degenerative  change. No fracture or dislocation. No significant joint effusion.      Assessment:  1. Primary osteoarthritis of both knees    2. Chronic pain of both knees        Plan:  Discussed the assessment with the patient.  Follow up: prn based on clinical progress  XR images independently visualized and reviewed with patient today in clinic  PF OA changes, R>L on imaging, reasonably consistent with her sx, R usually worse than the left  Overall pain is relatively mild, has worked hard on PT exercises  Reviewed importance of continuing these, and other low impact and core building strategies  Reviewed options for steroid injection for severe flares of pain and the possibility for future orthopedic referral prn  Reviewed safe and appropriate OTC medication choices, try tylenol first  Up to 3000mg daily of tylenol is generally safe, topical Voltaren Gel is also safe  Discussed nature of degenerative arthrosis of the knee.   Discussed symptom treatment with ice or heat, topical treatments, and rest if needed.   We discussed modified progressive pain-free activity as tolerated  Supportive care reviewed  All questions were answered today  Contact us with additional questions or concerns  Signs and sx of concern reviewed      Scotty Edmondson DO, CAQ  Primary Care Sports Medicine  [facility]                   Disclaimer: This note consists of symbols derived from keyboarding, dictation and/or voice recognition software. As a result, there may be errors in the script that have gone undetected. Please consider this when interpreting information found in this chart.      Again, thank you for allowing me to participate in the care of your patient.        Sincerely,        Scotty Edmondson DO

## 2021-06-21 DIAGNOSIS — B35.3 TINEA PEDIS OF BOTH FEET: ICD-10-CM

## 2021-06-22 RX ORDER — KETOCONAZOLE 20 MG/G
CREAM TOPICAL
Qty: 60 G | Refills: 0 | Status: SHIPPED | OUTPATIENT
Start: 2021-06-22 | End: 2023-07-30

## 2021-07-08 PROBLEM — M17.0 PRIMARY OSTEOARTHRITIS OF BOTH KNEES: Status: RESOLVED | Noted: 2020-10-09 | Resolved: 2021-07-08

## 2021-07-09 ENCOUNTER — APPOINTMENT (OUTPATIENT)
Dept: URBAN - METROPOLITAN AREA CLINIC 252 | Age: 61
Setting detail: DERMATOLOGY
End: 2021-07-09

## 2021-07-09 ENCOUNTER — RX ONLY (RX ONLY)
Age: 61
End: 2021-07-09

## 2021-07-09 DIAGNOSIS — Z41.9 ENCOUNTER FOR PROCEDURE FOR PURPOSES OTHER THAN REMEDYING HEALTH STATE, UNSPECIFIED: ICD-10-CM

## 2021-07-09 PROCEDURE — OTHER BOTOX (U OR CC): OTHER

## 2021-07-09 RX ORDER — TRETIONIN 1 MG/G
.1% CREAM TOPICAL NIGHTLY
Qty: 1 | Refills: 2 | Status: ERX | COMMUNITY
Start: 2021-07-09

## 2021-07-09 NOTE — PROCEDURE: BOTOX (U OR CC)
Price (Use Numbers Only, No Special Characters Or $): 953 Price (Use Numbers Only, No Special Characters Or $): 366

## 2021-09-05 ENCOUNTER — HEALTH MAINTENANCE LETTER (OUTPATIENT)
Age: 61
End: 2021-09-05

## 2021-09-21 DIAGNOSIS — B00.9 HERPES SIMPLEX VIRUS INFECTION: ICD-10-CM

## 2021-09-21 NOTE — TELEPHONE ENCOUNTER
She was given 40 tablets with 11 refills in May, why are they requesting refill?    Carol Jaeger MD

## 2021-09-21 NOTE — TELEPHONE ENCOUNTER
Routing refill request to provider for review/approval because:  Labs not current:  Creatinine   Lab Test 03/11/20  1904   CR 0.92      No future appointments scheduled at this time.       Cheli Robledo RN  [facility]

## 2021-09-22 RX ORDER — ACYCLOVIR 400 MG/1
TABLET ORAL
Qty: 30 TABLET | OUTPATIENT
Start: 2021-09-22

## 2021-10-14 ENCOUNTER — APPOINTMENT (OUTPATIENT)
Dept: URBAN - METROPOLITAN AREA CLINIC 252 | Age: 61
Setting detail: DERMATOLOGY
End: 2021-10-18

## 2021-10-14 VITALS — HEIGHT: 70 IN | WEIGHT: 180 LBS | RESPIRATION RATE: 15 BRPM

## 2021-10-14 DIAGNOSIS — L82.1 OTHER SEBORRHEIC KERATOSIS: ICD-10-CM

## 2021-10-14 DIAGNOSIS — Z41.9 ENCOUNTER FOR PROCEDURE FOR PURPOSES OTHER THAN REMEDYING HEALTH STATE, UNSPECIFIED: ICD-10-CM

## 2021-10-14 PROCEDURE — OTHER BOTOX (U OR CC): OTHER

## 2021-10-14 PROCEDURE — OTHER COUNSELING: OTHER

## 2021-10-14 PROCEDURE — 99212 OFFICE O/P EST SF 10 MIN: CPT

## 2021-10-14 ASSESSMENT — LOCATION DETAILED DESCRIPTION DERM: LOCATION DETAILED: LEFT INFERIOR CENTRAL MALAR CHEEK

## 2021-10-14 ASSESSMENT — LOCATION SIMPLE DESCRIPTION DERM: LOCATION SIMPLE: LEFT CHEEK

## 2021-10-14 ASSESSMENT — LOCATION ZONE DERM: LOCATION ZONE: FACE

## 2021-10-14 NOTE — PROCEDURE: BOTOX (U OR CC)
Post-Care Instructions: - Skin was cleansed with 70% isopropyl alcohol prior to injection today.\\n- Patient was instructed to not lie down for touch area for 6 hrs.\\n\\nTOTAL BOTOX USED TODAY: 18 units\\n\\nCOLOR KEY FOR DIAGRAM BELOW:\\nRed = 1/2 unit of Botox per injection\\nOrange = 1 units of Botox per injection\\nYellow = 2 units of Botox per injection\\nGreen = 3 units of Botox per injection\\nBlue = 4 units of Botox per injection\\nBrown = 5 units of Botox per injection\\nBlack = 6 units of Botox per injection

## 2021-10-14 NOTE — PROCEDURE: BOTOX (U OR CC)
Price (Use Numbers Only, No Special Characters Or $): 256 Price (Use Numbers Only, No Special Characters Or $): 348

## 2021-12-15 ENCOUNTER — VIRTUAL VISIT (OUTPATIENT)
Dept: FAMILY MEDICINE | Facility: CLINIC | Age: 61
End: 2021-12-15
Payer: COMMERCIAL

## 2021-12-15 DIAGNOSIS — U07.1 INFECTION DUE TO 2019 NOVEL CORONAVIRUS: Primary | ICD-10-CM

## 2021-12-15 PROCEDURE — 99213 OFFICE O/P EST LOW 20 MIN: CPT | Mod: 95 | Performed by: NURSE PRACTITIONER

## 2021-12-15 ASSESSMENT — ENCOUNTER SYMPTOMS
WHEEZING: 0
FEVER: 1
PALPITATIONS: 0
ACTIVITY CHANGE: 1
FATIGUE: 1
COUGH: 1
APPETITE CHANGE: 1
DIZZINESS: 0
CHILLS: 1
WEAKNESS: 1
CHEST TIGHTNESS: 0
ABDOMINAL PAIN: 0
DIARRHEA: 1
SHORTNESS OF BREATH: 0
TROUBLE SWALLOWING: 0
SORE THROAT: 1

## 2021-12-15 NOTE — PATIENT INSTRUCTIONS
"Discharge Instructions for COVID-19 Patients  You have--or may have--COVID-19. Please follow the instructions listed below.   If you have a weakened immune system, discuss with your doctor any other actions you need to take.  How can I protect others?  If you have symptoms (fever, cough, body aches or trouble breathing):    Stay home and away from others (self-isolate) until:  ? Your other symptoms have resolved (gotten better). And   ? You've had no fever--and no medicine that reduces fever--for 1 full day (24 hours). And   ? At least 10 days have passed since your symptoms started. (You may need to wait 20 days. Follow the advice of your care team.)  If you don't show symptoms, but testing showed that you have COVID-19:    Stay home and away from others (self-isolate) until at least 10 days have passed since the date of your first positive COVID-19 test.  During this time    Stay in your own room, even for meals. Use your own bathroom if you can.    Stay away from others in your home. No hugging, kissing or shaking hands. No visitors.    Don't go to work, school or anywhere else.    Clean \"high touch\" surfaces often (doorknobs, counters, handles). Use household cleaning spray or wipes.    You'll find a full list of  on the EPA website: www.epa.gov/pesticide-registration/list-n-disinfectants-use-against-sars-cov-2.    Cover your mouth and nose with a mask or other face covering to avoid spreading germs.    Wash your hands and face often. Use soap and water.    Caregivers in these groups are at risk for severe illness due to COVID-19:  ? People 65 years and older  ? People who live in a nursing home or long-term care facility  ? People with chronic disease (lung, heart, cancer, diabetes, kidney, liver, immunologic)  ? People who have a weakened immune system, including those who:    Are in cancer treatment    Take medicine that weakens the immune system, such as corticosteroids    Had a bone marrow or organ " transplant    Have an immune deficiency    Have poorly controlled HIV or AIDS    Are obese (body mass index of 40 or higher)    Smoke regularly    Caregivers should wear gloves while washing dishes, handling laundry and cleaning bedrooms and bathrooms.    Use caution when washing and drying laundry: Don't shake dirty laundry and use the warmest water setting that you can.    For more tips on managing your health at home, go to www.cdc.gov/coronavirus/2019-ncov/downloads/10Things.pdf.  How can I take care of myself at home?  1. Get lots of rest. Drink extra fluids (unless a doctor has told you not to).  2. Take Tylenol (acetaminophen) for fever or pain. If you have liver or kidney problems, ask your family doctor if it's okay to take Tylenol.   Adults can take either:   ? 650 mg (two 325 mg pills) every 4 to 6 hours, or   ? 1,000 mg (two 500 mg pills) every 8 hours as needed.  ? Note: Don't take more than 3,000 mg in one day. Acetaminophen is found in many medicines (both prescribed and over-the-counter medicines). Read all labels to be sure you don't take too much.   For children, check the Tylenol bottle for the right dose. The dose is based on the child's age or weight.  3. If you have other health problems (like cancer, heart failure, an organ transplant or severe kidney disease): Call your specialty clinic if you don't feel better in the next 2 days.  4. Know when to call 911. Emergency warning signs include:  ? Trouble breathing or shortness of breath  ? Pain or pressure in the chest that doesn't go away  ? Feeling confused like you haven't felt before, or not being able to wake up  ? Bluish-colored lips or face  5. Your doctor may have prescribed a blood thinner medicine. Follow their instructions.  Where can I get more information?     [facility] [place] - About COVID-19:   [email or web address]    CDC - What to Do If You're Sick:  www.cdc.gov/coronavirus/2019-ncov/about/steps-when-sick.html    CDC - Ending Home Isolation: www.cdc.gov/coronavirus/2019-ncov/hcp/disposition-in-home-patients.html    CDC - Caring for Someone: www.cdc.gov/coronavirus/2019-ncov/if-you-are-sick/care-for-someone.html    [facility] - Interim Guidance for Hospital Discharge to Home: www.health.[facility].mn./diseases/coronavirus/hcp/hospdischarge.pdf    Below are the COVID-19 hotlines at the [place] Department of Health ([facility]). Interpreters are available.  ? For health questions: Call 809-190-9349 or 1-955.301.8181 (7 a.m. to 7 p.m.)  ? For questions about schools and childcare: Call 918-730-4804 or 1-899.597.8939 (7 a.m. to 7 p.m.)    For informational purposes only. Not to replace the advice of your health care provider. Clinically reviewed by Dr. Mauro Teague.   Copyright   2020 [facility]. All rights reserved. [employer] 006505 - REV 01/05/21.

## 2021-12-15 NOTE — PROGRESS NOTES
Mary is a 61 year old who is being evaluated via a billable telephone visit.      What phone number would you like to be contacted at? [phone]  How would you like to obtain your AVS? [facility]    Assessment & Plan     1. Infection due to 2019 novel coronavirus  - push fluids to ensure adequate hydration. Diet as tolerated.  - recommend Tylenol or Ibuprofen every 6 hours prn pain/fever.   - Discussed quarantine guidelines.   - ED if develops chest pain, shortness of breath, or worsening of symptoms.     Return in about 1 week (around 12/22/2021) for worsening symptoms or failure to improve.    Rina Nicole, VENKAT [facility]   Mary is a 61 year old who presents for the following health issues     HPI     Yoselin reports she tested positive for Covid-19 on 12/13/21.  She has been experiencing fever 102, body aches, extreme fatigue, sore throat, and coughing.  Lost sense of taste and smell.  Denies chest pain or shortness of breath.  Reports decrease in appetite with diarrhea.  She is taking Tylenol BID for symptoms.  She was not vaccinated against Covid-19 and reports her roommate is also positive.  She was wanting to discuss symptoms to expect and when to return to work.         Review of Systems   Constitutional: Positive for activity change, appetite change, chills, fatigue and fever.   HENT: Positive for sore throat. Negative for congestion, ear pain and trouble swallowing.    Respiratory: Positive for cough. Negative for chest tightness, shortness of breath and wheezing.    Cardiovascular: Negative for chest pain and palpitations.   Gastrointestinal: Positive for diarrhea. Negative for abdominal pain.   Neurological: Positive for weakness. Negative for dizziness.            Objective           Vitals:  No vitals were obtained today due to virtual visit.    Physical Exam   alert and no distress  PSYCH: Alert and oriented times 3; coherent speech, normal    rate and volume, able to articulate logical thoughts, able   to abstract reason, no tangential thoughts, no hallucinations   or delusions  Her affect is normal  RESP: + cough, no audible wheezing, able to talk in full sentences  Remainder of exam unable to be completed due to telephone visits                Phone call duration: 15 minutes

## 2021-12-18 ENCOUNTER — ANCILLARY PROCEDURE (OUTPATIENT)
Dept: GENERAL RADIOLOGY | Facility: CLINIC | Age: 61
End: 2021-12-18
Attending: INTERNAL MEDICINE
Payer: COMMERCIAL

## 2021-12-18 ENCOUNTER — OFFICE VISIT (OUTPATIENT)
Dept: URGENT CARE | Facility: URGENT CARE | Age: 61
End: 2021-12-18
Payer: COMMERCIAL

## 2021-12-18 ENCOUNTER — NURSE TRIAGE (OUTPATIENT)
Dept: NURSING | Facility: CLINIC | Age: 61
End: 2021-12-18
Payer: COMMERCIAL

## 2021-12-18 VITALS
DIASTOLIC BLOOD PRESSURE: 74 MMHG | OXYGEN SATURATION: 95 % | HEART RATE: 66 BPM | SYSTOLIC BLOOD PRESSURE: 111 MMHG | TEMPERATURE: 97.5 F

## 2021-12-18 DIAGNOSIS — U07.1 PNEUMONIA DUE TO 2019 NOVEL CORONAVIRUS: Primary | ICD-10-CM

## 2021-12-18 DIAGNOSIS — U07.1 INFECTION DUE TO 2019 NOVEL CORONAVIRUS: ICD-10-CM

## 2021-12-18 DIAGNOSIS — J12.82 PNEUMONIA DUE TO 2019 NOVEL CORONAVIRUS: ICD-10-CM

## 2021-12-18 DIAGNOSIS — J12.82 PNEUMONIA DUE TO 2019 NOVEL CORONAVIRUS: Primary | ICD-10-CM

## 2021-12-18 DIAGNOSIS — U07.1 PNEUMONIA DUE TO 2019 NOVEL CORONAVIRUS: ICD-10-CM

## 2021-12-18 DIAGNOSIS — R05.9 COUGH: ICD-10-CM

## 2021-12-18 PROCEDURE — 99214 OFFICE O/P EST MOD 30 MIN: CPT | Performed by: INTERNAL MEDICINE

## 2021-12-18 PROCEDURE — 71046 X-RAY EXAM CHEST 2 VIEWS: CPT | Performed by: RADIOLOGY

## 2021-12-18 RX ORDER — BENZONATATE 200 MG/1
200 CAPSULE ORAL 3 TIMES DAILY PRN
Qty: 45 CAPSULE | Refills: 0 | Status: SHIPPED | OUTPATIENT
Start: 2021-12-18 | End: 2022-07-26

## 2021-12-18 RX ORDER — ALBUTEROL SULFATE 90 UG/1
2 AEROSOL, METERED RESPIRATORY (INHALATION) EVERY 4 HOURS PRN
Qty: 18 G | Refills: 0 | Status: SHIPPED | OUTPATIENT
Start: 2021-12-18 | End: 2022-07-26

## 2021-12-18 RX ORDER — CODEINE PHOSPHATE AND GUAIFENESIN 10; 100 MG/5ML; MG/5ML
1-2 SOLUTION ORAL EVERY 4 HOURS PRN
Qty: 120 ML | Refills: 0 | Status: SHIPPED | OUTPATIENT
Start: 2021-12-18 | End: 2022-07-26

## 2021-12-18 NOTE — PATIENT INSTRUCTIONS
Consider oximeter to monitor oxygen levels      Oxygen level normal here at clinic    Try tessalon perles, albuterol inhaler & robitussin with codeine.    Fluids  Rest    Call or return to clinic if symptoms worsen or fail to improve as anticipated.

## 2021-12-18 NOTE — TELEPHONE ENCOUNTER
Patient calling reporting she is COVID 19 positive.  Stating symptoms started 12/9/21 with cough, fever.    Patient reporting increased symptoms in past 24 hours of chest pressure, and shortness of breath with exertion.  Reporting chest pressure is mild-moderate and consistent.   Reporting shortness of breath is occurring with exertion only.  Stating she does not have an oximeter to monitor breathing.   Frequent coughing spasms.    Taking fluids. Reporting looser stools since 12/9/21.  Per guidelines constant chest pain or pressure/ED advised.  Patient declines ED. Agrees to go to [facility] now.    Mirella Curtis RN  [facility]      COVID 19 Nurse Triage Plan/Patient Instructions    Please be aware that novel coronavirus (COVID-19) may be circulating in the community. If you develop symptoms such as fever, cough, or SOB or if you have concerns about the presence of another infection including coronavirus (COVID-19), please contact your health care provider or visit [email or web address].     Disposition/Instructions    In-Person Visit with provider recommended. Reference Visit Selection Guide.    Thank you for taking steps to prevent the spread of this virus.  o Limit your contact with others.  o Wear a simple mask to cover your cough.  o Wash your hands well and often.    Resources    [facility]: About COVID-19: [email or web address]    CDC: What to Do If You're Sick: www.cdc.gov/coronavirus/2019-ncov/about/steps-when-sick.html    CDC: Ending Home Isolation: www.cdc.gov/coronavirus/2019-ncov/hcp/disposition-in-home-patients.html     CDC: Caring for Someone: www.cdc.gov/coronavirus/2019-ncov/if-you-are-sick/care-for-someone.html     [facility]: Interim Guidance for Hospital Discharge to Home: www.health.[facility].mn.us/diseases/coronavirus/hcp/hospdischarge.pdf    [facility] clinical trials (COVID-19 research studies): clinicalaffairs.[facility]/umn-clinical-trials      Below are the COVID-19 hotlines at the [place] Department of Health ([facility]). Interpreters are available.   o For health questions: Call 974-725-6854 or 1-341.994.9075 (7 a.m. to 7 p.m.)  o For questions about schools and childcare: Call 490-761-9986 or 1-295.409.7117 (7 a.m. to 7 p.m.)                       Reason for Disposition    SEVERE or constant chest pain or pressure (Exception: mild central chest pain, present only when coughing)    Additional Information    Negative: SEVERE difficulty breathing (e.g., struggling for each breath, speaks in single words)    Negative: Difficult to awaken or acting confused (e.g., disoriented, slurred speech)    Negative: Bluish (or gray) lips or face now    Negative: Shock suspected (e.g., cold/pale/clammy skin, too weak to stand, low BP, rapid pulse)    Negative: Sounds like a life-threatening emergency to the triager    Negative: [1] COVID-19 exposure AND [2] no symptoms    Negative: COVID-19 vaccine reaction suspected (e.g., fever, headache, muscle aches) occurring 1 to 3 days after getting vaccine    Negative: COVID-19 vaccine, questions about    Negative: [1] Lives with someone known to have influenza (flu test positive) AND [2] flu-like symptoms (e.g., cough, runny nose, sore throat, SOB; with or without fever)    Negative: [1] Adult with possible COVID-19 symptoms AND [2] triager concerned about severity of symptoms or other causes    Negative: COVID-19 and breastfeeding, questions about    Protocols used: CORONAVIRUS (COVID-19) DIAGNOSED OR [id number] 8.25.2021

## 2021-12-18 NOTE — PROGRESS NOTES
ASSESSMENT AND PLAN:      ICD-10-CM    1. Infection due to 2019 novel coronavirus  U07.1 XR Chest 2 Views   2. Pneumonia due to 2019 novel coronavirus  U07.1 benzonatate (TESSALON) 200 MG capsule    J12.82 albuterol (PROAIR HFA/PROVENTIL HFA/VENTOLIN HFA) 108 (90 Base) MCG/ACT inhaler     guaiFENesin-codeine (ROBITUSSIN AC) 100-10 MG/5ML solution     XR Chest 2 Views   3. Cough  R05.9 benzonatate (TESSALON) 200 MG capsule     albuterol (PROAIR HFA/PROVENTIL HFA/VENTOLIN HFA) 108 (90 Base) MCG/ACT inhaler     guaiFENesin-codeine (ROBITUSSIN AC) 100-10 MG/5ML solution     XR Chest 2 Views   Chest x-ray findings consistent with Covid pneumonia    PLAN:      Patient Instructions         Consider oximeter to monitor oxygen levels      Oxygen level normal here at clinic    Try tessalon perles, albuterol inhaler & robitussin with codeine.    Fluids  Rest    Call or return to clinic if symptoms worsen or fail to improve as anticipated.      Return in about 1 week (around 12/25/2021) for virtual visit.        Aggie Gonzáles MD  [facility] URGENT [facility]    Subjective     Mary R Juan C is a 61 year old who presents for Patient presents with:  Cough: Tested positive for COVID on 13th. Coughing up milky white since COVID. Constant pain in chest, sob.     an established patient of [facility].    covid Adult  History over phone    Onset of symptoms was 8 day(s) ago.  1 week prior thought had 24 hour flu with vomiting and diarrhea - then better but not 100%  Got tested for work - positive COVID 12/13  Current and Associated symptoms: fever, chills, sweats, runny nose, stuffy nose, cough - non-productive, sore throat, headache, body aches and fatigue  Past few days, coughing fits, painful coughing, causing chest pain & LBP. Now chest pain constant  Treatment measures tried include OTC Cough med, Fluids and vicks.  Predisposing factors include ill contact: roommate had covid.    Not vaccinated against  covid      Review of Systems   HENT:        Loss taste & smell           Objective    /74   Pulse 66   Temp 97.5  F (36.4  C) (Tympanic)   SpO2 95%   Physical Exam  Vitals reviewed.   Constitutional:       General: She is not in acute distress.     Appearance: Normal appearance. She is ill-appearing. She is not toxic-appearing or diaphoretic.   HENT:      Right Ear: Tympanic membrane normal.      Left Ear: Tympanic membrane normal.      Nose: Nose normal.      Mouth/Throat:      Mouth: Mucous membranes are moist.      Pharynx: Oropharynx is clear.   Eyes:      Conjunctiva/sclera: Conjunctivae normal.   Cardiovascular:      Rate and Rhythm: Normal rate and regular rhythm.      Pulses: Normal pulses.      Heart sounds: Normal heart sounds.   Pulmonary:      Breath sounds: Rales (faint wetness bilaterally lower lobes) present.   Neurological:      Mental Status: She is alert.            CXR - Reviewed and interpreted by me patchiness noted middle and lower lobes.  Consistent with COVID

## 2021-12-20 ENCOUNTER — TELEPHONE (OUTPATIENT)
Dept: FAMILY MEDICINE | Facility: CLINIC | Age: 61
End: 2021-12-20
Payer: COMMERCIAL

## 2021-12-20 NOTE — LETTER
[facility]  [address] [facility] [address]  Phone: [phone]    December 20, 2021        Mary Irizarry  [address] [facility] [address]          To whom it may concern:    RE: Mary Irizarry    Patient was seen and treated in urgent care for pneumonia on 12/18/21. She has a follow up appointment on 12/22/21. She has missed work due to pneumonia and will discuss plan for return to work at her follow up appointment on 12/22/21.    Please contact me for questions or concerns.      Sincerely,        VENKAT Quinn CNP

## 2021-12-20 NOTE — TELEPHONE ENCOUNTER
Reason for call:  Patient reporting a symptom    Symptom or request: pneumonia    Duration (how long have symptoms been present): a week    Have you been treated for this before? Yes    Additional comments: Needs note for work, has f/u 12/22, seen in  12/18/21    Phone Number patient can be reached at:  Cell number on file:    Telephone Information:   Mobile [phone]       Best Time:  Any    Can we leave a detailed message on this number:  YES    Call taken on 12/20/2021 at 8:28 AM by Arturo Rodriguez

## 2021-12-21 ENCOUNTER — NURSE TRIAGE (OUTPATIENT)
Dept: NURSING | Facility: CLINIC | Age: 61
End: 2021-12-21
Payer: COMMERCIAL

## 2021-12-21 NOTE — TELEPHONE ENCOUNTER
Patient is calling and [facility] on December 18th was diagnosed with pneumonia patient was into  on 12/18/2021.  Patient tested positive on December 13th for covid.  Symptoms started on December 9th.  Today has a cough and is coughing up yellow mucus.  Patient denies fever.  Patient is needing a note to return to work.     COVID 19 Nurse Triage Plan/Patient Instructions    Please be aware that novel coronavirus (COVID-19) may be circulating in the community. If you develop symptoms such as fever, cough, or SOB or if you have concerns about the presence of another infection including coronavirus (COVID-19), please contact your health care provider or visit [email or web address].     Disposition/Instructions    Home care recommended. Follow home care protocol based instructions.    Thank you for taking steps to prevent the spread of this virus.  o Limit your contact with others.  o Wear a simple mask to cover your cough.  o Wash your hands well and often.    Resources    [facility]: About COVID-19: [email or web address]    CDC: What to Do If You're Sick: www.cdc.gov/coronavirus/2019-ncov/about/steps-when-sick.html    CDC: Ending Home Isolation: www.cdc.gov/coronavirus/2019-ncov/hcp/disposition-in-home-patients.html     CDC: Caring for Someone: www.cdc.gov/coronavirus/2019-ncov/if-you-are-sick/care-for-someone.html     [facility]: Interim Guidance for Hospital Discharge to Home: www.health.[facility].mn.us/diseases/coronavirus/hcp/hospdischarge.pdf    [facility] clinical trials (COVID-19 research studies): clinicalaffairs.[facility]/umn-clinical-trials     Below are the COVID-19 hotlines at the [facility] of Health ([facility]). Interpreters are available.   o For health questions: Call 833-907-4304 or 1-140.663.8202 (7 a.m. to 7 p.m.)  o For questions about schools and childcare: Call 066-033-4615 or 1-693.982.1775 (7 a.m. to 7 p.m.)                        Reason for Disposition    Cough with  no complications    Additional Information    Negative: Bluish (or gray) lips or face    Negative: Severe difficulty breathing (e.g., struggling for each breath, speaks in single words)    Negative: Rapid onset of cough and has hives    Negative: Coughing started suddenly after medicine, an allergic food or bee sting    Negative: Difficulty breathing after exposure to flames, smoke, or fumes    Negative: Sounds like a life-threatening emergency to the triager    Negative: Chest pain present when not coughing    Negative: Difficulty breathing    Negative: Passed out (i.e., fainted, collapsed and was not responding)    Negative: Patient sounds very sick or weak to the triager    Negative: Coughed up > 1 tablespoon (15 ml) blood (Exception: blood-tinged sputum)    Negative: Fever > 103 F (39.4 C)    Negative: Fever > 101 F (38.3 C) and over 60 years of age    Negative: Fever > 100.0 F (37.8 C) and has diabetes mellitus or a weak immune system (e.g., HIV positive, cancer chemotherapy, organ transplant, splenectomy, chronic steroids)    Negative: Fever > 100.0 F (37.8 C) and bedridden (e.g., nursing home patient, stroke, chronic illness, recovering from surgery)    Negative: Increasing ankle swelling    Negative: Wheezing is present    Negative: SEVERE coughing spells (e.g., whooping sound after coughing, vomiting after coughing)    Negative: Coughing up sarah-colored (reddish-brown) or blood-tinged sputum    Negative: Fever present > 3 days (72 hours)    Negative: Fever returns after gone for over 24 hours and symptoms worse or not improved    Negative: Using nasal washes and pain medicine > 24 hours and sinus pain persists    Negative: Known COPD or other severe lung disease (i.e., bronchiectasis, cystic fibrosis, lung surgery) and worsening symptoms (i.e., increased sputum purulence or amount, increased breathing difficulty)    Negative: Continuous (nonstop) coughing interferes with work or school and no improvement  using cough treatment per Care Advice    Negative: Patient wants to be seen    Negative: Cough has been present for > 3 weeks    Negative: Allergy symptoms are also present (e.g., itchy eyes, clear nasal discharge, postnasal drip)    Negative: Nasal discharge present > 10 days    Negative: Exposure to TB (Tuberculosis)    Negative: Taking an ACE Inhibitor medication (e.g., benazepril/LOTENSIN, captopril/CAPOTEN, enalapril/VASOTEC, lisinopril/ZESTRIL)    Protocols used: COUGH-A-OH

## 2021-12-23 ENCOUNTER — VIRTUAL VISIT (OUTPATIENT)
Dept: FAMILY MEDICINE | Facility: CLINIC | Age: 61
End: 2021-12-23
Payer: COMMERCIAL

## 2021-12-23 DIAGNOSIS — U07.1 INFECTION DUE TO 2019 NOVEL CORONAVIRUS: Primary | ICD-10-CM

## 2021-12-23 DIAGNOSIS — J12.82 PNEUMONIA DUE TO 2019 NOVEL CORONAVIRUS: ICD-10-CM

## 2021-12-23 DIAGNOSIS — U07.1 PNEUMONIA DUE TO 2019 NOVEL CORONAVIRUS: ICD-10-CM

## 2021-12-23 PROCEDURE — 99213 OFFICE O/P EST LOW 20 MIN: CPT | Mod: 95 | Performed by: PHYSICIAN ASSISTANT

## 2021-12-23 NOTE — PROGRESS NOTES
"Mary is a 61 year old who is being evaluated via a billable video visit.      How would you like to obtain your AVS? MyChart  If the video visit is dropped, the invitation should be resent by: Text to cell phone: [phone]  Will anyone else be joining your video visit? No      Video Start Time: 7:04am    Assessment & Plan     1. Infection due to 2019 novel coronavirus    2. Pneumonia due to 2019 novel coronavirus        1,2) Symptoms are improving. She feels she will be ready to return to work on Monday. Work note completed. Follow up if symptoms fail to improve or worsen.     Review of previous notes         BMI:   Estimated body mass index is 26.88 kg/m  as calculated from the following:    Height as of 6/2/21: 1.791 m (5' 10.5\").    Weight as of 6/2/21: 86.2 kg (190 lb).     Return in about 4 days (around 12/27/2021), or if unable to return to work.    Kasandra Hartmann PA-C  [facility]    Laura Hernandez is a 61 year old who presents for the following health issues     HPI     Concern - Recheck pneumonia     Description: Pt looking for clearance letter to return to work. Pt reports symptoms have resolved and is feeling better    Energy isn't fully back  Fevers have resolved  Would like to return to work on Monday   First day of work missed: 12/13/21    CXR 12/18/21:  IMPRESSION: Hazy peripheral groundglass opacities compatible with COVID-19. No pneumothorax or pleural effusion. Heart size normal.    VV 12/12/21: tests ordered, Covid positive on 12/13/21  VV 12/15/21: Sxs persist, supportive cares discussed  OV 12/18/21: CXR done. Given Tessalon, albuterol, Robitussin AC      Review of Systems   Constitutional, HEENT, cardiovascular, pulmonary, gi and gu systems are negative, except as otherwise noted.      Objective           Vitals:  No vitals were obtained today due to virtual visit.    Physical Exam   GENERAL: Healthy, alert and no distress  EYES: Eyes grossly normal " to inspection.  No discharge or erythema, or obvious scleral/conjunctival abnormalities.  RESP: No audible wheeze, cough, or visible cyanosis.  No visible retractions or increased work of breathing.    SKIN: Visible skin clear. No significant rash, abnormal pigmentation or lesions.  NEURO: Cranial nerves grossly intact.  Mentation and speech appropriate for age.  PSYCH: Mentation appears normal, affect normal/bright, judgement and insight intact, normal speech and appearance well-groomed.          Video-Visit Details    Type of service:  Video Visit    Video End Time: 7:12am, 16 mins total, switched to telephone visit [facility] through due to poor connection    Originating Location (pt. Location): Home    Distant Location (provider location):  [facility]     Platform used for Video Visit: [email or web address]

## 2021-12-23 NOTE — LETTER
[facility]  [address] [facility] [address]  Phone: [phone]    December 23, 2021        Mary Irizarry  [address] [facility] [address]          To whom it may concern:    RE: Mary Irizarry    Patient was seen and treated today at our clinic. Due to medical reasons she has been out of work since 12/13/21. I anticipate her return to work on Monday, 12/27/21.    Please contact me for questions or concerns.      Sincerely,        Kasandra Hartmann PA-C

## 2022-01-01 NOTE — PROCEDURE: BOTOX (U OR CC)
Dilution (U/0.1 Cc): 10 Please see your pediatrician in 1-2 days for their first check up. This appointment is very important. The pediatrician will check to be sure that your baby is not losing too much weight, is staying hydrated, is not having jaundice and is continuing to do well. call MD for any questions or concerns

## 2022-02-04 ENCOUNTER — APPOINTMENT (OUTPATIENT)
Dept: URBAN - METROPOLITAN AREA CLINIC 252 | Age: 62
Setting detail: DERMATOLOGY
End: 2022-02-04

## 2022-02-04 DIAGNOSIS — Z41.9 ENCOUNTER FOR PROCEDURE FOR PURPOSES OTHER THAN REMEDYING HEALTH STATE, UNSPECIFIED: ICD-10-CM

## 2022-02-04 PROCEDURE — OTHER RESTYLANE LYFT INJECTION: OTHER

## 2022-02-04 NOTE — PROCEDURE: RESTYLANE LYFT INJECTION
Price (Use Numbers Only, No Special Characters Or $): 646 Price (Use Numbers Only, No Special Characters Or $): 414

## 2022-02-06 DIAGNOSIS — J12.82 PNEUMONIA DUE TO 2019 NOVEL CORONAVIRUS: ICD-10-CM

## 2022-02-06 DIAGNOSIS — U07.1 PNEUMONIA DUE TO 2019 NOVEL CORONAVIRUS: ICD-10-CM

## 2022-02-06 DIAGNOSIS — R05.9 COUGH: ICD-10-CM

## 2022-02-07 RX ORDER — ALBUTEROL SULFATE 90 UG/1
AEROSOL, METERED RESPIRATORY (INHALATION)
Qty: 18 G | OUTPATIENT
Start: 2022-02-07

## 2022-04-22 ENCOUNTER — TELEPHONE (OUTPATIENT)
Dept: FAMILY MEDICINE | Facility: CLINIC | Age: 62
End: 2022-04-22
Payer: COMMERCIAL

## 2022-04-22 ENCOUNTER — OFFICE VISIT (OUTPATIENT)
Dept: URGENT CARE | Facility: URGENT CARE | Age: 62
End: 2022-04-22
Payer: COMMERCIAL

## 2022-04-22 VITALS
SYSTOLIC BLOOD PRESSURE: 143 MMHG | WEIGHT: 191.6 LBS | OXYGEN SATURATION: 97 % | TEMPERATURE: 98.1 F | HEIGHT: 71 IN | HEART RATE: 74 BPM | BODY MASS INDEX: 26.82 KG/M2 | DIASTOLIC BLOOD PRESSURE: 93 MMHG

## 2022-04-22 DIAGNOSIS — R39.9 UTI SYMPTOMS: ICD-10-CM

## 2022-04-22 DIAGNOSIS — N94.9 VAGINAL DISCOMFORT: ICD-10-CM

## 2022-04-22 DIAGNOSIS — N30.00 ACUTE CYSTITIS WITHOUT HEMATURIA: Primary | ICD-10-CM

## 2022-04-22 LAB
ALBUMIN UR-MCNC: NEGATIVE MG/DL
APPEARANCE UR: ABNORMAL
BACTERIA #/AREA URNS HPF: ABNORMAL /HPF
BILIRUB UR QL STRIP: NEGATIVE
CLUE CELLS: ABNORMAL
COLOR UR AUTO: YELLOW
GLUCOSE UR STRIP-MCNC: NEGATIVE MG/DL
HGB UR QL STRIP: ABNORMAL
KETONES UR STRIP-MCNC: NEGATIVE MG/DL
LEUKOCYTE ESTERASE UR QL STRIP: ABNORMAL
NITRATE UR QL: NEGATIVE
PH UR STRIP: 6 [PH] (ref 5–7)
RBC #/AREA URNS AUTO: ABNORMAL /HPF
SP GR UR STRIP: <=1.005 (ref 1–1.03)
SQUAMOUS #/AREA URNS AUTO: ABNORMAL /LPF
TRICHOMONAS, WET PREP: ABNORMAL
UROBILINOGEN UR STRIP-ACNC: 0.2 E.U./DL
WBC #/AREA URNS AUTO: >100 /HPF
WBC'S/HIGH POWER FIELD, WET PREP: ABNORMAL
YEAST, WET PREP: ABNORMAL

## 2022-04-22 PROCEDURE — 87186 SC STD MICRODIL/AGAR DIL: CPT | Performed by: PHYSICIAN ASSISTANT

## 2022-04-22 PROCEDURE — 99214 OFFICE O/P EST MOD 30 MIN: CPT | Performed by: PHYSICIAN ASSISTANT

## 2022-04-22 PROCEDURE — 87086 URINE CULTURE/COLONY COUNT: CPT | Performed by: PHYSICIAN ASSISTANT

## 2022-04-22 PROCEDURE — 81001 URINALYSIS AUTO W/SCOPE: CPT | Performed by: PHYSICIAN ASSISTANT

## 2022-04-22 PROCEDURE — 87210 SMEAR WET MOUNT SALINE/INK: CPT | Performed by: PHYSICIAN ASSISTANT

## 2022-04-22 RX ORDER — NITROFURANTOIN 25; 75 MG/1; MG/1
100 CAPSULE ORAL 2 TIMES DAILY
Qty: 14 CAPSULE | Refills: 0 | Status: SHIPPED | OUTPATIENT
Start: 2022-04-22 | End: 2022-04-29

## 2022-04-22 ASSESSMENT — ENCOUNTER SYMPTOMS
CONSTITUTIONAL NEGATIVE: 1
ACTIVITY CHANGE: 0
LIGHT-HEADEDNESS: 0
FLANK PAIN: 0
HEMATURIA: 0
ADENOPATHY: 0
NECK PAIN: 0
DYSURIA: 1
SHORTNESS OF BREATH: 0
VOMITING: 0
NAUSEA: 0
SORE THROAT: 0
CHILLS: 0
ABDOMINAL PAIN: 0
PALPITATIONS: 0
NEUROLOGICAL NEGATIVE: 1
DIARRHEA: 0
GASTROINTESTINAL NEGATIVE: 1
MYALGIAS: 0
POLYDIPSIA: 0
RESPIRATORY NEGATIVE: 1
FATIGUE: 0
MUSCULOSKELETAL NEGATIVE: 1
HEADACHES: 0
COUGH: 0
FREQUENCY: 1
NECK STIFFNESS: 0
DIZZINESS: 0
RHINORRHEA: 0
ENDOCRINE NEGATIVE: 1
CARDIOVASCULAR NEGATIVE: 1
WEAKNESS: 0
FEVER: 0

## 2022-04-22 NOTE — PROGRESS NOTES
Chief Complaint:    Chief Complaint   Patient presents with     UTI SYMPTOMS         ASSESSMENT     1. Acute cystitis without hematuria    2. UTI symptoms    3. Vaginal discomfort           PLAN    Urinalysis discussed with patient  We will call with culture results if resistant.  Wet prep was negative  Rx for Macrobid today  Follow up with PCP in 2-3 days if symptoms are not improving.  Worrisome symptoms discussed with instructions to go to the ED.  Patient verbalized understanding and agreed with this plan.    Labs:     Results for orders placed or performed in visit on 04/22/22   UA Macro with Reflex to Micro and Culture - lab collect     Status: Abnormal    Specimen: Urine, Clean Catch   Result Value Ref Range    Color Urine Yellow Colorless, Straw, Light Yellow, Yellow    Appearance Urine Cloudy (A) Clear    Glucose Urine Negative Negative mg/dL    Bilirubin Urine Negative Negative    Ketones Urine Negative Negative mg/dL    Specific Gravity Urine <=1.005 1.003 - 1.035    Blood Urine Small (A) Negative    pH Urine 6.0 5.0 - 7.0    Protein Albumin Urine Negative Negative mg/dL    Urobilinogen Urine 0.2 0.2, 1.0 E.U./dL    Nitrite Urine Negative Negative    Leukocyte Esterase Urine Large (A) Negative   Urine Microscopic Exam     Status: Abnormal   Result Value Ref Range    Bacteria Urine Moderate (A) None Seen /HPF    RBC Urine 5-10 (A) 0-2 /HPF /HPF    WBC Urine >100 (A) 0-5 /HPF /HPF    Squamous Epithelials Urine Few (A) None Seen /LPF   Wet prep - lab collect     Status: Abnormal    Specimen: Vagina; Swab   Result Value Ref Range    Trichomonas Absent Absent    Yeast Absent Absent    Clue Cells Absent Absent    WBCs/high power field 3+ (A) None       Problem history    Patient Active Problem List   Diagnosis     Raynaud's syndrome     Herpes simplex virus infection     RBBB (right bundle branch block with left anterior fascicular block)     Atrial enlargement, left       Current Meds    Current Outpatient  Medications:      acyclovir (ZOVIRAX) 400 MG tablet, Take 1 tablet (400 mg) by mouth daily May take 1 tablet tid for 5 days with outbreak, Disp: 40 tablet, Rfl: 11     albuterol (PROAIR HFA/PROVENTIL HFA/VENTOLIN HFA) 108 (90 Base) MCG/ACT inhaler, Inhale 2 puffs into the lungs every 4 hours as needed for shortness of breath / dyspnea or wheezing, Disp: 18 g, Rfl: 0     benzonatate (TESSALON) 200 MG capsule, Take 1 capsule (200 mg) by mouth 3 times daily as needed for cough, Disp: 45 capsule, Rfl: 0     cetirizine (ZYRTEC) 10 MG tablet, Take 1 tablet (10 mg) by mouth daily Needs to be seen for further refills, Disp: 30 tablet, Rfl: 11     guaiFENesin-codeine (ROBITUSSIN AC) 100-10 MG/5ML solution, Take 5-10 mLs by mouth every 4 hours as needed for cough, Disp: 120 mL, Rfl: 0     ketoconazole (NIZORAL) 2 % external cream, APPLY TOPICALLY TO AFFECTED AREA EVERY DAY, Disp: 60 g, Rfl: 0     nitroFURantoin macrocrystal-monohydrate (MACROBID) 100 MG capsule, Take 1 capsule (100 mg) by mouth 2 times daily for 7 days, Disp: 14 capsule, Rfl: 0     terbinafine (LAMISIL) 1 % external cream, Apply topically 2 times daily, Disp: 42 g, Rfl: 1     tolnaftate (LAMISIL) 1 % external spray, Externally apply topically 2 times daily To feet as needed, Disp: 138 g, Rfl: 4     zolpidem (AMBIEN) 5 MG tablet, Take 1 tablet (5 mg) by mouth nightly as needed for sleep, Disp: 30 tablet, Rfl: 1    Allergies  Allergies   Allergen Reactions     Levaquin [Levofloxacin] Other (See Comments)     tendonitis     Penicillins Rash       SUBJECTIVE    HPI:  Mary Irizarry is a 62 year old female who has symptoms of urinary dysuria and frequency for 1 day(s).  she denies nausea, vomiting, fever and chills, flank pain, vaginal discharge, and vaginal odor.    ROS:      Review of Systems   Constitutional: Negative.  Negative for activity change, chills, fatigue and fever.   HENT: Negative for congestion, ear pain, rhinorrhea and sore throat.     Respiratory: Negative.  Negative for cough and shortness of breath.    Cardiovascular: Negative.  Negative for chest pain and palpitations.   Gastrointestinal: Negative.  Negative for abdominal pain, diarrhea, nausea and vomiting.   Endocrine: Negative.  Negative for polydipsia and polyuria.   Genitourinary: Positive for dysuria and frequency. Negative for flank pain, hematuria, pelvic pain, urgency, vaginal discharge and vaginal pain.   Musculoskeletal: Negative.  Negative for myalgias, neck pain and neck stiffness.   Allergic/Immunologic: Negative for immunocompromised state.   Neurological: Negative.  Negative for dizziness, weakness, light-headedness and headaches.   Hematological: Negative for adenopathy.       Family History   Family History   Problem Relation Age of Onset     Diabetes Father      Glaucoma No family hx of      Macular Degeneration No family hx of         Social History  Social History     Socioeconomic History     Marital status: Single     Spouse name: Not on file     Number of children: Not on file     Years of education: Not on file     Highest education level: Not on file   Occupational History     Not on file   Tobacco Use     Smoking status: Former Smoker     Smokeless tobacco: Never Used   Substance and Sexual Activity     Alcohol use: Yes     Drug use: No     Sexual activity: Never   Other Topics Concern     Parent/sibling w/ CABG, MI or angioplasty before 65F 55M? Not Asked   Social History Narrative     Not on file     Social Determinants of Health     Financial Resource Strain: Not on file   Food Insecurity: Not on file   Transportation Needs: Not on file   Physical Activity: Not on file   Stress: Not on file   Social Connections: Not on file   Intimate Partner Violence: Not on file   Housing Stability: Not on file           OBJECTIVE     Vital signs noted and reviewed by Boby Carson PA-C  BP (!) 143/93 (BP Location: Left arm, Patient Position: Sitting, Cuff Size: Adult  "Regular)   Pulse 74   Temp 98.1  F (36.7  C) (Tympanic)   Ht 1.803 m (5' 11\")   Wt 86.9 kg (191 lb 9.6 oz)   SpO2 97%   BMI 26.72 kg/m       Physical Exam  Vitals and nursing note reviewed.   Constitutional:       General: She is not in acute distress.     Appearance: Normal appearance. She is well-developed. She is not ill-appearing, toxic-appearing or diaphoretic.   HENT:      Head: Normocephalic and atraumatic.      Right Ear: Tympanic membrane and external ear normal.      Left Ear: Tympanic membrane and external ear normal.   Eyes:      Pupils: Pupils are equal, round, and reactive to light.   Cardiovascular:      Rate and Rhythm: Normal rate and regular rhythm.      Heart sounds: Normal heart sounds. No murmur heard.    No friction rub. No gallop.   Pulmonary:      Effort: Pulmonary effort is normal. No respiratory distress.      Breath sounds: Normal breath sounds. No wheezing or rales.   Chest:      Chest wall: No tenderness.   Abdominal:      General: Bowel sounds are normal. There is no distension.      Palpations: Abdomen is soft. Abdomen is not rigid. There is no mass.      Tenderness: There is no abdominal tenderness. There is no guarding or rebound. Negative signs include Manzo's sign and McBurney's sign.   Musculoskeletal:      Cervical back: Normal range of motion and neck supple.   Lymphadenopathy:      Cervical: No cervical adenopathy.   Skin:     General: Skin is warm and dry.   Neurological:      Mental Status: She is alert and oriented to person, place, and time.      Cranial Nerves: No cranial nerve deficit.      Deep Tendon Reflexes: Reflexes are normal and symmetric.   Psychiatric:         Behavior: Behavior normal. Behavior is cooperative.         Thought Content: Thought content normal.         Judgment: Judgment normal.             Boby Carson PA-C  4/22/2022, 3:08 PM    "

## 2022-04-22 NOTE — TELEPHONE ENCOUNTER
Patient reports that she has a bladder infection. She has dysuria, slight back pain.     Denies: nausea, vomting.    Nursing advice: Patient was advised to be seen in urgent care today. Patient was given the hours for urgent care [place] [facility] [place].  Patient verbalized good understanding, agrees with plan and needs no further support.  Thank you. Karen Antonio R.N.

## 2022-04-24 LAB — BACTERIA UR CULT: ABNORMAL

## 2022-05-21 DIAGNOSIS — J30.2 SEASONAL ALLERGIES: ICD-10-CM

## 2022-05-21 DIAGNOSIS — B00.9 HERPES SIMPLEX VIRUS INFECTION: ICD-10-CM

## 2022-05-23 RX ORDER — CETIRIZINE HYDROCHLORIDE 10 MG/1
10 TABLET ORAL DAILY
Qty: 30 TABLET | Refills: 11 | Status: SHIPPED | OUTPATIENT
Start: 2022-05-23 | End: 2023-05-24

## 2022-05-23 RX ORDER — ACYCLOVIR 400 MG/1
400 TABLET ORAL DAILY
Qty: 40 TABLET | Refills: 0 | Status: SHIPPED | OUTPATIENT
Start: 2022-05-23 | End: 2022-07-26

## 2022-06-03 NOTE — HPI: SKIN LESIONS
Is This A New Presentation, Or A Follow-Up?: [facility]  Visit Type: treatment  Precautions:  Medical precautions:  fall risk; standard precautions. Lines:     Basic: indwelling urinary catheter and capped IV      Lines in chart and on patient reviewed, precautions maintained throughout session. Lower Extremity:    Left above knee amputation    SUBJECTIVE  Patient agreed to participate in therapy this date. Patient is a 81year old male admitted to [facility] with complaints of black stools. .  Pt resides in an assisted living facility. At baseline, patient is Moderate Assist with functional mobility tasks using a wheelchair. Patient reports being able to propel himself or with assistance as he fatigues. Patient / Family Goal: maximize function    Pain   RN informed on pain level     OBJECTIVE     Oriented to person, place and time     Arousal alertness: appropriate responses to stimuli  Patient activity tolerance: 1 to 1 activity to rest    Bed Mobility:        Supine to sit: moderate assist, maximal assist and with tactile cues  Training completed:      Education details: body mechanics, patient safety and patient requires additional training  Transfers:    Assistive devices: mechanical sit to stand lift    Sit to stand: total assist - dependent     Stand to sit: total assist - dependent     Stand pivot: total assist - dependent   Gait/Ambulation:        Non ambulatory at baseline      Interventions     Supine    Lower Extremity: Supine LE exercises: AP, QS, heel slides & knee ext to Rt LE only.    Training provided: activity tolerance, body mechanics, transfer training, use of adaptive equipment and safety training    Skilled input: Verbal instruction/cues, tactile instruction/cues and posture correction  Verbal Consent: Writer verbally educated and received verbal consent for hand placement, positioning of patient, and techniques to be performed today from patient for clothing adjustments for techniques, hand placement and palpation for techniques and therapist position for techniques as described above and how they are pertinent to the patient's plan of care. ASSESSMENT   Impairments: activity tolerance, strength, skin integrity and endurance  Functional Limitations: all functional mobility    Visit # since seen by PT:  3    Patient is displaying fair progress as evidenced by continued use of mechanical lift to complete transfers. Pt does tolerate lift well but continues to have poor activity tolerance and declines further therapy after transfer to chair. Patient is demonstrating decreased range of motion, decreased strength, decreased activity tolerance, decreased endurance which is limiting the completion of all functional mobility at this time. Further skilled physical therapy is required to address these limitations in attempt to maximize the patient's independence. PT's recommendation for discharge is Prior living situation (return to previous living situation) due to the following reasons:  Good support, appropriate level of care         Discharge Recommendations  Recommendation for Discharge Location: PT [place]: Prior living situation (return to previous living situation)  PT/OT ADL Equipment for Discharge: none anticipated          Patient at End of Session:   Location: in bed  Safety measures: alarm system in place/re-engaged and lines intact  Handoff to: nurse    Progress: slow progress, decreased activity tolerance    â¢ Skilled therapy is required to address these limitations in attempt to maximize the patient's independence. â¢ Predicted patient presentation: Low (stable) - Patient comorbidities and complexities, as defined above, will have little effect on progress for prescribed plan of care.     Education Provided:   Learning Assessment:  - Primary learner: patient  - Are they ready to learn: yes  - Preferred learning style: verbal  - Barriers to learning: no barriers apparent at this time  Education provided during session:  - Receiving Education: patient  - Results of above outlined education: Verbalizes understanding    PLAN   Suggestions for next session as indicated: Bed mobility, standing tolerance, transfers with non mechanical lift     PT Frequency: 3 days per week        Interventions: balance, bed mobility, body mechanics, endurance training, functional transfer training, neuromuscular re-education, ROM, strengthening and safety education  Agreement to plan and goals: patient agrees with goals and treatment plan        GOALS  Review Date: 6/5/2022  Long Term Goals: (to be met by time of discharge from hospital)  Sit to supine: Patient will complete sit to supine moderate assist.  Status: progressing/ongoing  Supine to sit: Patient will complete supine to sit moderate assist.  Status: progressing/ongoing  Sit to stand: Patient will complete sit to stand transfer with non-mechanical sit to stand lift, minimal assist and moderate assist.   Status: progressing/ongoing  Stand to sit: Patient will complete stand to sit transfer with non-mechanical sit to stand lift, minimal assist and moderate assist.   Status: progressing/ongoing          Therapy procedure time and total treatment time can be found documented on the Time Entry flowsheet

## 2022-06-12 ENCOUNTER — HEALTH MAINTENANCE LETTER (OUTPATIENT)
Age: 62
End: 2022-06-12

## 2022-07-14 ENCOUNTER — ANCILLARY PROCEDURE (OUTPATIENT)
Dept: MAMMOGRAPHY | Facility: CLINIC | Age: 62
End: 2022-07-14
Payer: COMMERCIAL

## 2022-07-14 DIAGNOSIS — Z12.31 VISIT FOR SCREENING MAMMOGRAM: ICD-10-CM

## 2022-07-14 PROCEDURE — 77067 SCR MAMMO BI INCL CAD: CPT | Mod: TC | Performed by: RADIOLOGY

## 2022-07-21 ENCOUNTER — RX ONLY (RX ONLY)
Age: 62
End: 2022-07-21

## 2022-07-21 ENCOUNTER — APPOINTMENT (OUTPATIENT)
Dept: URBAN - METROPOLITAN AREA CLINIC 252 | Age: 62
Setting detail: DERMATOLOGY
End: 2022-07-24

## 2022-07-21 VITALS — HEIGHT: 70 IN | WEIGHT: 190 LBS | RESPIRATION RATE: 16 BRPM

## 2022-07-21 DIAGNOSIS — Z71.89 OTHER SPECIFIED COUNSELING: ICD-10-CM

## 2022-07-21 DIAGNOSIS — Z41.9 ENCOUNTER FOR PROCEDURE FOR PURPOSES OTHER THAN REMEDYING HEALTH STATE, UNSPECIFIED: ICD-10-CM

## 2022-07-21 DIAGNOSIS — L72.8 OTHER FOLLICULAR CYSTS OF THE SKIN AND SUBCUTANEOUS TISSUE: ICD-10-CM

## 2022-07-21 DIAGNOSIS — L82.1 OTHER SEBORRHEIC KERATOSIS: ICD-10-CM

## 2022-07-21 DIAGNOSIS — L85.1 ACQUIRED KERATOSIS [KERATODERMA] PALMARIS ET PLANTARIS: ICD-10-CM

## 2022-07-21 DIAGNOSIS — L81.4 OTHER MELANIN HYPERPIGMENTATION: ICD-10-CM

## 2022-07-21 PROBLEM — D23.71 OTHER BENIGN NEOPLASM OF SKIN OF RIGHT LOWER LIMB, INCLUDING HIP: Status: ACTIVE | Noted: 2022-07-21

## 2022-07-21 PROCEDURE — OTHER JEUVEAU (U OR CC): OTHER

## 2022-07-21 PROCEDURE — OTHER MIPS QUALITY: OTHER

## 2022-07-21 PROCEDURE — 99213 OFFICE O/P EST LOW 20 MIN: CPT

## 2022-07-21 PROCEDURE — OTHER COUNSELING: OTHER

## 2022-07-21 RX ORDER — TRETIONIN 1 MG/G
.1% CREAM TOPICAL AT BEDTIME
Qty: 45 | Refills: 3 | Status: ERX

## 2022-07-21 ASSESSMENT — LOCATION DETAILED DESCRIPTION DERM
LOCATION DETAILED: RIGHT POSTERIOR SHOULDER
LOCATION DETAILED: LEFT MID-UPPER BACK
LOCATION DETAILED: LEFT POSTERIOR SHOULDER
LOCATION DETAILED: MEDIAL FRONTAL SCALP
LOCATION DETAILED: RIGHT CENTRAL TEMPLE
LOCATION DETAILED: SUPERIOR THORACIC SPINE
LOCATION DETAILED: RIGHT MEDIAL PLANTAR HEEL
LOCATION DETAILED: LEFT MEDIAL PLANTAR HEEL
LOCATION DETAILED: LEFT SUPERIOR MEDIAL UPPER BACK

## 2022-07-21 ASSESSMENT — LOCATION ZONE DERM
LOCATION ZONE: TRUNK
LOCATION ZONE: FACE
LOCATION ZONE: ARM
LOCATION ZONE: SCALP
LOCATION ZONE: FEET

## 2022-07-21 ASSESSMENT — LOCATION SIMPLE DESCRIPTION DERM
LOCATION SIMPLE: LEFT SHOULDER
LOCATION SIMPLE: UPPER BACK
LOCATION SIMPLE: RIGHT TEMPLE
LOCATION SIMPLE: LEFT PLANTAR SURFACE
LOCATION SIMPLE: RIGHT SHOULDER
LOCATION SIMPLE: RIGHT PLANTAR SURFACE
LOCATION SIMPLE: FRONTAL SCALP
LOCATION SIMPLE: LEFT UPPER BACK

## 2022-07-21 NOTE — PROCEDURE: JEUVEAU (U OR CC)
Price (Use Numbers Only, No Special Characters Or $):  Price (Use Numbers Only, No Special Characters Or $): 394

## 2022-07-21 NOTE — PROCEDURE: JEUVEAU (U OR CC)
Post-Care Instructions: - Skin was cleansed with 70% isopropyl alcohol prior to injection today.\\n- Patient was instructed to not lie down for touch area for 6 hrs.\\n\\nTOTAL JEUVEAU USED TODAY: 26 units\\n\\nCOLOR KEY FOR DIAGRAM BELOW:\\nRed = 1/2 unit of Jeuveau per injection\\nOrange = 1 units of Jeuveau per injection\\nYellow = 2 units of Jeuveau per injection\\nGreen = 3 units of Jeuveau per injection\\nBlue = 4 units of Jeuveau per injection\\nBrown = 5 units of Jeuveau per injection\\nBlack = 6 units of Jeuveau per injection

## 2022-07-21 NOTE — PROCEDURE: COUNSELING
Detail Level: Zone
Patient Specific Counseling (Will Not Stick From Patient To Patient): **patient will return in the fall for LN2 removal. Reviewed pricing with patient.
Detail Level: Simple
Patient Specific Counseling (Will Not Stick From Patient To Patient): **discussed possible punch removal if area becomes symptomatic.
Detail Level: Generalized
Detail Level: Detailed

## 2022-07-23 DIAGNOSIS — B00.9 HERPES SIMPLEX VIRUS INFECTION: ICD-10-CM

## 2022-07-24 RX ORDER — ACYCLOVIR 400 MG/1
TABLET ORAL
Qty: 40 TABLET | Refills: 0 | OUTPATIENT
Start: 2022-07-24

## 2022-07-25 NOTE — PROGRESS NOTES
SUBJECTIVE:   CC: Mary Irizarry is an 62 year old woman who presents for preventive health visit.       Patient has been advised of split billing requirements and indicates understanding: Yes   Healthy Habits:     Getting at least 3 servings of Calcium per day:  Yes    Bi-annual eye exam:  Yes    Dental care twice a year:  Yes    Sleep apnea or symptoms of sleep apnea:  None    Diet:  Regular (no restrictions)    Frequency of exercise:  2-3 days/week    Duration of exercise:  15-30 minutes    Taking medications regularly:  Yes    Medication side effects:  None    PHQ-2 Total Score: 0    Additional concerns today:  No    Ability to successfully perform activities of daily living: Yes, no assistance needed  Home safety:none  Hearing impairment: no    Pt doing well no concerns  Does not want any labwork  Needs refill of acyclovir for cold sores  Cream for her athletes foot and some ambien to use intermittently            Today's PHQ-2 Score:   PHQ-2 ( 1999 [employer]) 7/26/2022   Q1: Little interest or pleasure in doing things 0   Q2: Feeling down, depressed or hopeless 0   PHQ-2 Score 0   PHQ-2 Total Score (12-17 Years)- Positive if 3 or more points; Administer PHQ-A if positive -   Q1: Little interest or pleasure in doing things Not at all   Q2: Feeling down, depressed or hopeless Not at all   PHQ-2 Score 0       Abuse: Current or Past (Physical, Sexual or Emotional) - No  Do you feel safe in your environment? Yes        Social History     Tobacco Use     Smoking status: Former Smoker     Smokeless tobacco: Never Used   Substance Use Topics     Alcohol use: Yes     If you drink alcohol do you typically have >3 drinks per day or >7 drinks per week? No    Alcohol Use 7/26/2022   Prescreen: >3 drinks/day or >7 drinks/week? Not Applicable   Prescreen: >3 drinks/day or >7 drinks/week? -       Reviewed orders with patient.  Reviewed health maintenance and updated orders accordingly - No  Labs reviewed in  EPIC    Breast Cancer Screening:  Any new diagnosis of family breast, ovarian, or bowel cancer? No    FHS-7:   Breast CA Risk Assessment (FHS-7) 7/14/2022   Did any of your first-degree relatives have breast or ovarian cancer? No   Did any of your relatives have bilateral breast cancer? No   Did any man in your family have breast cancer? No   Did any woman in your family have breast and ovarian cancer? No   Did any woman in your family have breast cancer before age 50 y? No   Do you have 2 or more relatives with breast and/or ovarian cancer? No   Do you have 2 or more relatives with breast and/or bowel cancer? No       Mammogram Screening: Recommended mammography every 1-2 years with patient discussion and risk factor consideration  Pertinent mammograms are reviewed under the imaging tab.    History of abnormal Pap smear: NO - age 30-65 PAP every 5 years with negative HPV co-testing recommended  PAP / HPV Latest Ref Rng & Units 3/11/2020   PAP (Historical) - NIL   HPV16 NEG:Negative Negative   HPV18 NEG:Negative Negative   HRHPV NEG:Negative Negative     Reviewed and updated as needed this visit by clinical staff   Tobacco  Allergies  Meds   Med Hx  Surg Hx  Fam Hx  Soc Hx          Reviewed and updated as needed this visit by Provider                       Review of Systems   Constitutional: Negative for chills and fever.   HENT: Negative for congestion, ear pain, hearing loss and sore throat.    Eyes: Positive for visual disturbance. Negative for pain.   Respiratory: Negative for cough and shortness of breath.    Cardiovascular: Positive for peripheral edema. Negative for chest pain and palpitations.   Gastrointestinal: Negative for abdominal pain, constipation, diarrhea, heartburn, hematochezia and nausea.   Breasts:  Negative for tenderness, breast mass and discharge.   Genitourinary: Negative for dysuria, frequency, genital sores, hematuria, pelvic pain, urgency, vaginal bleeding and vaginal discharge.  "  Musculoskeletal: Positive for myalgias. Negative for arthralgias and joint swelling.   Skin: Negative for rash.   Neurological: Negative for dizziness, weakness, headaches and paresthesias.   Psychiatric/Behavioral: Negative for mood changes. The patient is not nervous/anxious.      CONSTITUTIONAL: NEGATIVE for fever, chills, change in weight  INTEGUMENTARY/SKIN: NEGATIVE for worrisome rashes, moles or lesions  EYES: NEGATIVE for vision changes or irritation  ENT: NEGATIVE for ear, mouth and throat problems  RESP: NEGATIVE for significant cough or SOB  BREAST: NEGATIVE for masses, tenderness or discharge  CV: NEGATIVE for chest pain, palpitations or peripheral edema  GI: NEGATIVE for nausea, abdominal pain, heartburn, or change in bowel habits  : NEGATIVE for unusual urinary or vaginal symptoms. No vaginal bleeding.  MUSCULOSKELETAL: NEGATIVE for significant arthralgias or myalgia  NEURO: NEGATIVE for weakness, dizziness or paresthesias  PSYCHIATRIC: NEGATIVE for changes in mood or affect      OBJECTIVE:   /76   Pulse 60   Temp 97.7  F (36.5  C) (Tympanic)   Resp 16   Ht 1.803 m (5' 11\")   Wt 85.3 kg (188 lb)   SpO2 98%   BMI 26.22 kg/m    Physical Exam  GENERAL: healthy, alert and no distress  EYES: Eyes grossly normal to inspection, PERRL and conjunctivae and sclerae normal  HENT: ear canals and TM's normal, nose and mouth without ulcers or lesions  NECK: no adenopathy, no asymmetry, masses, or scars and thyroid normal to palpation  RESP: lungs clear to auscultation - no rales, rhonchi or wheezes  CV: regular rate and rhythm, normal S1 S2, no S3 or S4, no murmur, click or rub, no peripheral edema and peripheral pulses strong  ABDOMEN: soft, nontender, no hepatosplenomegaly, no masses and bowel sounds normal  MS: no gross musculoskeletal defects noted, no edema  SKIN: no suspicious lesions or rashes  NEURO: Normal strength and tone, mentation intact and speech normal  PSYCH: mentation appears " "normal, affect normal/bright    Diagnostic Test Results:  Labs reviewed in Epic    ASSESSMENT/PLAN:   (Z00.00) Routine general medical examination at a health care facility  (primary encounter diagnosis)  Comment:   Plan:     (B00.9) Herpes simplex virus infection  Comment: uses daily and increased dose with flare ups  Plan: acyclovir (ZOVIRAX) 400 MG tablet            (B35.3) Tinea pedis of both feet  Comment: refill  Plan: terbinafine (LAMISIL) 1 % external cream            (G47.00) Insomnia, unspecified type  Comment: use as needed  Plan: zolpidem (AMBIEN) 5 MG tablet            (Z12.11) Screen for colon cancer  Comment: pt is due and agreeable  Plan: Colonscopy Screening  Referral              Patient has been advised of split billing requirements and indicates understanding: Yes    COUNSELING:  Reviewed preventive health counseling, as reflected in patient instructions       Regular exercise       Healthy diet/nutrition       Vision screening       Colorectal Cancer Screening    Estimated body mass index is 26.22 kg/m  as calculated from the following:    Height as of this encounter: 1.803 m (5' 11\").    Weight as of this encounter: 85.3 kg (188 lb).    Weight management plan: Discussed healthy diet and exercise guidelines    She reports that she has quit smoking. She has never used smokeless tobacco.      Counseling Resources:  ATP IV Guidelines  Pooled Cohorts Equation Calculator  Breast Cancer Risk Calculator  BRCA-Related Cancer Risk Assessment: FHS-7 Tool  FRAX Risk Assessment  ICSI Preventive Guidelines  Dietary Guidelines for Americans, 2010  USDA's MyPlate  ASA Prophylaxis  Lung CA Screening    Carol Jaeger MD  [facility]  "

## 2022-07-26 ENCOUNTER — OFFICE VISIT (OUTPATIENT)
Dept: FAMILY MEDICINE | Facility: CLINIC | Age: 62
End: 2022-07-26
Payer: COMMERCIAL

## 2022-07-26 VITALS
TEMPERATURE: 97.7 F | DIASTOLIC BLOOD PRESSURE: 76 MMHG | RESPIRATION RATE: 16 BRPM | HEART RATE: 60 BPM | OXYGEN SATURATION: 98 % | BODY MASS INDEX: 26.32 KG/M2 | HEIGHT: 71 IN | WEIGHT: 188 LBS | SYSTOLIC BLOOD PRESSURE: 112 MMHG

## 2022-07-26 DIAGNOSIS — Z12.11 SCREEN FOR COLON CANCER: ICD-10-CM

## 2022-07-26 DIAGNOSIS — Z00.00 ROUTINE GENERAL MEDICAL EXAMINATION AT A HEALTH CARE FACILITY: Primary | ICD-10-CM

## 2022-07-26 DIAGNOSIS — B35.3 TINEA PEDIS OF BOTH FEET: ICD-10-CM

## 2022-07-26 DIAGNOSIS — B00.9 HERPES SIMPLEX VIRUS INFECTION: ICD-10-CM

## 2022-07-26 DIAGNOSIS — G47.00 INSOMNIA, UNSPECIFIED TYPE: ICD-10-CM

## 2022-07-26 PROCEDURE — 99213 OFFICE O/P EST LOW 20 MIN: CPT | Mod: 25 | Performed by: FAMILY MEDICINE

## 2022-07-26 PROCEDURE — 99396 PREV VISIT EST AGE 40-64: CPT | Performed by: FAMILY MEDICINE

## 2022-07-26 RX ORDER — PRENATAL VIT 91/IRON/FOLIC/DHA 28-975-200
COMBINATION PACKAGE (EA) ORAL 2 TIMES DAILY
Qty: 42 G | Refills: 1 | Status: SHIPPED | OUTPATIENT
Start: 2022-07-26 | End: 2023-07-31

## 2022-07-26 RX ORDER — ACYCLOVIR 400 MG/1
TABLET ORAL
Qty: 40 TABLET | Refills: 11 | Status: SHIPPED | OUTPATIENT
Start: 2022-07-26 | End: 2023-07-27

## 2022-07-26 RX ORDER — ZOLPIDEM TARTRATE 5 MG/1
5 TABLET ORAL
Qty: 30 TABLET | Refills: 1 | Status: SHIPPED | OUTPATIENT
Start: 2022-07-26 | End: 2024-02-22

## 2022-07-26 ASSESSMENT — ENCOUNTER SYMPTOMS
JOINT SWELLING: 0
SHORTNESS OF BREATH: 0
CONSTIPATION: 0
HEMATURIA: 0
NAUSEA: 0
EYE PAIN: 0
SORE THROAT: 0
FEVER: 0
HEMATOCHEZIA: 0
DYSURIA: 0
COUGH: 0
PALPITATIONS: 0
PARESTHESIAS: 0
FREQUENCY: 0
HEADACHES: 0
BREAST MASS: 0
ABDOMINAL PAIN: 0
MYALGIAS: 1
ARTHRALGIAS: 0
NERVOUS/ANXIOUS: 0
CHILLS: 0
DIARRHEA: 0
WEAKNESS: 0
HEARTBURN: 0
DIZZINESS: 0

## 2022-07-26 ASSESSMENT — PAIN SCALES - GENERAL: PAINLEVEL: NO PAIN (0)

## 2022-07-27 ENCOUNTER — TELEPHONE (OUTPATIENT)
Dept: GASTROENTEROLOGY | Facility: CLINIC | Age: 62
End: 2022-07-27

## 2022-07-27 ENCOUNTER — HOSPITAL ENCOUNTER (OUTPATIENT)
Facility: AMBULATORY SURGERY CENTER | Age: 62
End: 2022-07-27
Attending: SURGERY | Admitting: SURGERY
Payer: COMMERCIAL

## 2022-07-27 NOTE — TELEPHONE ENCOUNTER
Screening Questions    BlueKIND OF PREP RedLOCATION [review exclusion criteria] GreenSEDATION TYPE      1. Are you active on mychart? Y    2. What insurance is in the chart? [employer]     3.   Ordering/Referring Provider: Carol Jaeger MD     4. BMI   (If greater than 40 review exclusion criteria [PAC APPT IF [MAC] @ [facility])  26.2  [If yes, BMI OVER 40-EXTENDED PREP]      **(Sedation review/consideration needed)**  Do you have a legal guardian or Medical Power of    and/or are you able to give consent for your medical care?     Y    5. Have you had a positive covid test in the last 90 days?   N -     6.  Are you currently on dialysis?   N [ If yes, G-PREP & HOSPITAL setting ONLY]     7.  Do you have chronic kidney disease?  N [ If yes, G-PREP ]    8.   Do you have a diagnosis of diabetes?   N   [ If yes, G-PREP ]    9.  On a regular basis do you go 3-5 days between bowel movements?   N   [ If yes, EXTENDED PREP]    10.  Are you taking any prescription pain medications on a routine schedule?    N -  [ If yes, EXTENDED PREP] [If yes, MAC]      11.   Do you have any chemical dependencies such as alcohol, street drugs, or methadone?    N [If yes, MAC]    12.   Do you have any history of post-traumatic stress syndrome, severe anxiety or history of psychosis?    N  [If yes, MAC]    13.  [FEMALES] Are you currently pregnant? N    If yes, how many weeks?       Respiratory/Heart Screening:  [If yes to any of the following HOSPITAL setting only]     14. Do you have Pulmonary Hypertension [Lungs]?   N       15. Do you have UNCONTROLLED asthma?   N     16.  Do you use daily home oxygen?  N      17. Do you have mod to severe Obstructive Sleep Apnea?         (OKAY @ [facility]  [facility]  SH  PH  RI  MG - if pt is not on OXYGEN)  N      18.   Have you had a heart or lung transplant?   N      19.   Have you had a stroke or Transient ischemic attack (TIA - aka  mini stroke ) within 6 months?  (If yes, please review exclusion  criteria)  N     20.   In the past 6 months, have you had any heart related issues including cardiomyopathy or heart attack?   NA           If yes, did it require cardiac stenting or other implantable device?   N      21.   Do you have any implantable devices in your body (pacemaker, defib, LVAD)? (If yes, please review exclusion criteria)  N     22.  Do you take the medication Phentermine?  N     Yes-> Hold for 7 days before procedure.  Please consult your prescribing provider if you have questions about holding this medication.     No-> Continue to next question.    23. Do you take nitroglycerin?   N           If yes, how often? NA  (if yes, HOSPITAL setting ONLY)    24.  Are you currently taking any blood thinners?    [If yes, INFORM patient to follw up w/ ORDERING PROVIDER FOR BRIDGING INSTRUCTIONS]     N    25.   Do you transfer independently?                (If NO, please HOSPITAL setting ONLY)  Y    26.   Preferred LOCAL Pharmacy for Pre Prescription:      [id number] - [place], [place] - [address]      Scheduling Details  (Please ask for phone number if not scheduled by patient)      Caller : Mary Irizarry    Date of Procedure: 9/9  Surgeon: Zuhair  Location: [facility]        Sedation Type: CS l Per Protocol  Conscious Sedation- Needs  for 6 hours after the procedure  MAC/General-Needs  for 24 hours after procedure    N :[Pre-op Required] at [facility]  [facility] and OR for MAC sedation   (advise patient they will need a pre-op WITH IN 30 DAYS of procedure date)     Type of Procedure Scheduled:   Lower Endoscopy [Colonoscopy]    Which Colonoscopy Prep was Sent?:   Miralax - Per Protocol    CLAUDIA CF PATIENTS & GROEN'S PATIENTS NEEDS EXTENDED PREP       Informed patient they will need an adult  Y  Cannot take any type of public or medical transportation alone    Pre-Procedure Covid test to be completed at [facility] or Externally: Y   **INFORMED OF HOME TESTING & LAB OPTION**        Confirmed Nurse will call to complete assessment Y    Additional comments:

## 2022-08-02 ENCOUNTER — TELEPHONE (OUTPATIENT)
Dept: GASTROENTEROLOGY | Facility: CLINIC | Age: 62
End: 2022-08-02

## 2022-08-02 NOTE — TELEPHONE ENCOUNTER
Caller:Eduard    Procedure: Colon    Date, Location, and Surgeon of Procedure Cancelled: [place] [facility] 9/9/22    Ordering Provider:bebeto Camacho Laurie    Reason for cancel (please be detailed, any staff messages or encounters to note?): MD PTO per project time from [employer]    Left message for patient to call back and [employer] message sent    Rescheduled: n     If rescheduled:    Date:    Location:    Prep Resent: (changes to prep?)   Covid Test Rescheduled:    Note any change or update to original order/sedation:

## 2022-10-23 ENCOUNTER — HEALTH MAINTENANCE LETTER (OUTPATIENT)
Age: 62
End: 2022-10-23

## 2023-03-17 ENCOUNTER — TELEPHONE (OUTPATIENT)
Dept: FAMILY MEDICINE | Facility: CLINIC | Age: 63
End: 2023-03-17
Payer: COMMERCIAL

## 2023-03-17 NOTE — TELEPHONE ENCOUNTER
Patient Quality Outreach    Patient is due for the following:   Colon Cancer Screening      Topic Date Due     COVID-19 Vaccine (1) Never done     Zoster (Shingles) Vaccine (1 of 2) Never done     Flu Vaccine (1) 09/01/2022       Next Steps:   Schedule a office visit for Colorectal Cancer Screening    Type of outreach:    Sent [employer] message.      Questions for provider review:         Jonathon White MA

## 2023-03-22 ENCOUNTER — APPOINTMENT (OUTPATIENT)
Dept: URBAN - METROPOLITAN AREA CLINIC 252 | Age: 63
Setting detail: DERMATOLOGY
End: 2023-03-27

## 2023-03-22 VITALS — RESPIRATION RATE: 16 BRPM | HEIGHT: 55 IN | WEIGHT: 182 LBS

## 2023-03-22 DIAGNOSIS — L24 IRRITANT CONTACT DERMATITIS: ICD-10-CM

## 2023-03-22 DIAGNOSIS — L81.4 OTHER MELANIN HYPERPIGMENTATION: ICD-10-CM

## 2023-03-22 PROBLEM — L24.9 IRRITANT CONTACT DERMATITIS, UNSPECIFIED CAUSE: Status: ACTIVE | Noted: 2023-03-22

## 2023-03-22 PROCEDURE — 99213 OFFICE O/P EST LOW 20 MIN: CPT

## 2023-03-22 PROCEDURE — OTHER PRESCRIPTION: OTHER

## 2023-03-22 PROCEDURE — OTHER COUNSELING: OTHER

## 2023-03-22 RX ORDER — TRIAMCINOLONE ACETONIDE 1 MG/G
0.1% CREAM TOPICAL BID
Qty: 80 | Refills: 1 | Status: ERX | COMMUNITY
Start: 2023-03-22

## 2023-03-22 ASSESSMENT — LOCATION ZONE DERM: LOCATION ZONE: ARM

## 2023-03-22 ASSESSMENT — LOCATION SIMPLE DESCRIPTION DERM
LOCATION SIMPLE: LEFT FOREARM
LOCATION SIMPLE: RIGHT FOREARM

## 2023-03-22 ASSESSMENT — LOCATION DETAILED DESCRIPTION DERM
LOCATION DETAILED: RIGHT PROXIMAL DORSAL FOREARM
LOCATION DETAILED: LEFT PROXIMAL DORSAL FOREARM
LOCATION DETAILED: LEFT DISTAL DORSAL FOREARM

## 2023-03-22 NOTE — PROCEDURE: COUNSELING
Detail Level: Zone
Patient Specific Counseling (Will Not Stick From Patient To Patient): Advised to discontinue tretinoin. May restart once rash has cleared 3-4 night a week.

## 2023-03-27 ENCOUNTER — RX ONLY (RX ONLY)
Age: 63
End: 2023-03-27

## 2023-03-27 RX ORDER — TRETIONIN 1 MG/G
.1% CREAM TOPICAL AT BEDTIME
Qty: 45 | Refills: 3 | Status: ERX

## 2023-04-04 ENCOUNTER — E-VISIT (OUTPATIENT)
Dept: FAMILY MEDICINE | Facility: CLINIC | Age: 63
End: 2023-04-04
Payer: COMMERCIAL

## 2023-04-04 ENCOUNTER — TELEPHONE (OUTPATIENT)
Dept: FAMILY MEDICINE | Facility: CLINIC | Age: 63
End: 2023-04-04

## 2023-04-04 DIAGNOSIS — M79.604 PAIN OF RIGHT LOWER EXTREMITY: Primary | ICD-10-CM

## 2023-04-04 PROCEDURE — 99421 OL DIG E/M SVC 5-10 MIN: CPT | Performed by: FAMILY MEDICINE

## 2023-04-04 NOTE — TELEPHONE ENCOUNTER
Team, please return call to patient and inform patient that provider would like some form of a visit to further discuss PT referral, as noted below.  Patient can set up virtual or submit an E-visit through [employer], if this is preferred over office visit.  Thank you.    Leticia Monahan RN  Clinical Triage/Primary Care  [facility]

## 2023-04-04 NOTE — TELEPHONE ENCOUNTER
Incoming call from pt.     Pt requesting referral to begin going to PT at [facility] in [place] to treat right leg pain present with activity/exercise that is associated with an old hamstring injury that occurred years ago when pt used to jog.    [facility] - [id number], [place] [address] - phone: [phone]. Pt states she will obtain the [facility] fax number by the time clinic calls back.    Please advise if Evisit or other visit type is needed for referral request. Pt is scheduled for annual physical exam when next due in July on 7/31/23.    Jahiara Woodard, MITULN, RN

## 2023-04-05 ENCOUNTER — TRANSFERRED RECORDS (OUTPATIENT)
Dept: HEALTH INFORMATION MANAGEMENT | Facility: CLINIC | Age: 63
End: 2023-04-05
Payer: COMMERCIAL

## 2023-04-24 ENCOUNTER — APPOINTMENT (OUTPATIENT)
Dept: URBAN - METROPOLITAN AREA CLINIC 252 | Age: 63
Setting detail: DERMATOLOGY
End: 2023-04-24

## 2023-04-24 VITALS — HEIGHT: 70 IN | WEIGHT: 186 LBS | RESPIRATION RATE: 16 BRPM

## 2023-04-24 DIAGNOSIS — L82.1 OTHER SEBORRHEIC KERATOSIS: ICD-10-CM

## 2023-04-24 DIAGNOSIS — L81.4 OTHER MELANIN HYPERPIGMENTATION: ICD-10-CM

## 2023-04-24 DIAGNOSIS — L81.0 POSTINFLAMMATORY HYPERPIGMENTATION: ICD-10-CM

## 2023-04-24 PROCEDURE — OTHER LIQUID NITROGEN (COSMETIC): OTHER

## 2023-04-24 PROCEDURE — OTHER PRESCRIPTION: OTHER

## 2023-04-24 PROCEDURE — 99213 OFFICE O/P EST LOW 20 MIN: CPT

## 2023-04-24 PROCEDURE — OTHER BENIGN DESTRUCTION COSMETIC: OTHER

## 2023-04-24 PROCEDURE — OTHER COUNSELING: OTHER

## 2023-04-24 RX ORDER — HYDROQUINONE 4 %
CREAM (GRAM) TOPICAL BID
Qty: 30 | Refills: 2 | Status: ERX | COMMUNITY
Start: 2023-04-24

## 2023-04-24 ASSESSMENT — LOCATION DETAILED DESCRIPTION DERM
LOCATION DETAILED: RIGHT MEDIAL TRAPEZIAL NECK
LOCATION DETAILED: LEFT CLAVICULAR NECK
LOCATION DETAILED: RIGHT LATERAL TRAPEZIAL NECK
LOCATION DETAILED: LEFT ANTERIOR SHOULDER
LOCATION DETAILED: LEFT POSTERIOR SHOULDER
LOCATION DETAILED: RIGHT ANTERIOR SHOULDER
LOCATION DETAILED: RIGHT POSTERIOR SHOULDER
LOCATION DETAILED: LEFT ANTERIOR PROXIMAL UPPER ARM
LOCATION DETAILED: RIGHT PROXIMAL LATERAL POSTERIOR UPPER ARM
LOCATION DETAILED: LEFT PROXIMAL POSTERIOR UPPER ARM
LOCATION DETAILED: LEFT CENTRAL MALAR CHEEK

## 2023-04-24 ASSESSMENT — LOCATION ZONE DERM
LOCATION ZONE: NECK
LOCATION ZONE: ARM
LOCATION ZONE: ARM
LOCATION ZONE: FACE

## 2023-04-24 ASSESSMENT — LOCATION SIMPLE DESCRIPTION DERM
LOCATION SIMPLE: LEFT UPPER ARM
LOCATION SIMPLE: LEFT SHOULDER
LOCATION SIMPLE: RIGHT SHOULDER
LOCATION SIMPLE: LEFT CHEEK
LOCATION SIMPLE: RIGHT SHOULDER
LOCATION SIMPLE: RIGHT POSTERIOR UPPER ARM
LOCATION SIMPLE: LEFT ANTERIOR NECK
LOCATION SIMPLE: POSTERIOR NECK
LOCATION SIMPLE: LEFT POSTERIOR UPPER ARM
LOCATION SIMPLE: LEFT SHOULDER

## 2023-04-24 NOTE — PROCEDURE: LIQUID NITROGEN (COSMETIC)
Consent: The patient's consent was obtained including but not limited to risks of crusting, scabbing, blistering, scarring, darker or lighter pigmentary change, recurrence, incomplete removal and infection. The patient understands that the procedure is cosmetic in nature and is not covered by insurance.
Show Spray Paint Technique Variable?: Yes
Price (Use Numbers Only, No Special Characters Or $): 150
Spray Paint Text: The liquid nitrogen was applied to the skin utilizing a spray paint frosting technique.
Post-Care Instructions: I reviewed with the patient in detail post-care instructions. Patient is to wear sunprotection, and avoid picking at any of the treated lesions. Pt may apply Vaseline to crusted or scabbing areas.
Render Post-Care Instructions In Note?: no
Billing Information: Bill by Static Price
Detail Level: Detailed
None

## 2023-04-24 NOTE — PROCEDURE: COUNSELING
Detail Level: Detailed
Detail Level: Zone
Patient Specific Counseling (Will Not Stick From Patient To Patient): **foothills handout given today.

## 2023-06-18 ENCOUNTER — OFFICE VISIT (OUTPATIENT)
Dept: URGENT CARE | Facility: URGENT CARE | Age: 63
End: 2023-06-18
Payer: COMMERCIAL

## 2023-06-18 ENCOUNTER — ANCILLARY PROCEDURE (OUTPATIENT)
Dept: GENERAL RADIOLOGY | Facility: CLINIC | Age: 63
End: 2023-06-18
Attending: STUDENT IN AN ORGANIZED HEALTH CARE EDUCATION/TRAINING PROGRAM
Payer: COMMERCIAL

## 2023-06-18 VITALS
TEMPERATURE: 98 F | RESPIRATION RATE: 20 BRPM | BODY MASS INDEX: 27.39 KG/M2 | WEIGHT: 196.4 LBS | HEART RATE: 61 BPM | OXYGEN SATURATION: 98 % | DIASTOLIC BLOOD PRESSURE: 79 MMHG | SYSTOLIC BLOOD PRESSURE: 118 MMHG

## 2023-06-18 DIAGNOSIS — S69.92XA INJURY OF FINGER OF LEFT HAND, INITIAL ENCOUNTER: Primary | ICD-10-CM

## 2023-06-18 DIAGNOSIS — S69.92XA INJURY OF FINGER OF LEFT HAND, INITIAL ENCOUNTER: ICD-10-CM

## 2023-06-18 PROCEDURE — 73140 X-RAY EXAM OF FINGER(S): CPT | Mod: TC | Performed by: RADIOLOGY

## 2023-06-18 PROCEDURE — 99213 OFFICE O/P EST LOW 20 MIN: CPT | Performed by: STUDENT IN AN ORGANIZED HEALTH CARE EDUCATION/TRAINING PROGRAM

## 2023-06-18 NOTE — PROGRESS NOTES
Assessment & Plan     RBBB (right bundle branch block with left anterior fascicular block)  ***    Injury of finger of left hand, initial encounter  ***  - XR Finger Left G/E 2 Views     {2021 E&M time (Optional):321055}    {Provider  Link to [facility] Help Grid :888896}    No follow-ups on file.    Oxana Dai, APRGRIS [facility] JOSE DAVIDBRYAN Hernandez is a 63 year old female who presents to clinic today for the following health issues:  Chief Complaint   Patient presents with     Finger     Left; ring; swollen     HPI    ***  {UC Conditions (Optional):591251}    Review of Systems  {ROS COMP (Optional):672670}      Objective    /79   Pulse 61   Temp 98  F (36.7  C) (Tympanic)   Resp 20   Wt 89.1 kg (196 lb 6.4 oz)   SpO2 98%   BMI 27.39 kg/m    Physical Exam   {Exam List (Optional):464571}    {Diagnostic Test Results (Optional):622555}

## 2023-06-18 NOTE — PROGRESS NOTES
"  Assessment & Plan     Injury of finger of left hand, initial encounter  Patient here for finger injury in which she reports happened about 2-3 weeks ago as she was trying to stop a car door from hitting her leg.  Mild tenderness and swelling.  Xray reveals no obvious fracture.  Discussed hand therapy vs home exercise and rest.  Patient opted to just monitor for now and rest as needed. Limit use and lifting heavy objects.   - XR Finger Left G/E 2 Views; Future     BMI:   Estimated body mass index is 27.39 kg/m  as calculated from the following:    Height as of 7/26/22: 1.803 m (5' 11\").    Weight as of this encounter: 89.1 kg (196 lb 6.4 oz).       She is to follow up as needed.     William Guerrero, MITULN, RN, PHN  Nurse Practitioner Student  [facility]      Oxana Dai, APRN [facility] ADÁN Hernandez is a 63 year old, presenting for the following health issues:  Finger (Left; ring; swollen)         View : No data to display.              HPI   Patient here with concerns of left ring finger swelling. She is concerned it is broken. 2-3 weeks ago she was trying to stop a car door from hitting her knee and put that hand down.  It has been swollen since.  Reports she is able to make a fist, but holding items causes some pain.  Did try some Aspercreme which did not seem to help.    Review of Systems   Constitutional, HEENT, cardiovascular, pulmonary, GI, , musculoskeletal, neuro, skin, endocrine and psych systems are negative, except as otherwise noted.      Objective    /79   Pulse 61   Temp 98  F (36.7  C) (Tympanic)   Resp 20   Wt 89.1 kg (196 lb 6.4 oz)   SpO2 98%   BMI 27.39 kg/m    Body mass index is 27.39 kg/m .  Physical Exam   GENERAL: healthy, alert and no distress  MS: slight tenderness to palpation of left ring finger.  Mild swelling between middle to promxial phalanges, no redness  SKIN: no suspicious lesions or rashes  PSYCH: " mentation appears normal, affect normal/bright    X-ray ordered and interpreted in the office today. X-ray shows: see imaging results

## 2023-07-27 DIAGNOSIS — B00.9 HERPES SIMPLEX VIRUS INFECTION: ICD-10-CM

## 2023-07-27 RX ORDER — ACYCLOVIR 400 MG/1
TABLET ORAL
Qty: 40 TABLET | Refills: 0 | Status: SHIPPED | OUTPATIENT
Start: 2023-07-27 | End: 2023-07-31

## 2023-07-31 ENCOUNTER — OFFICE VISIT (OUTPATIENT)
Dept: FAMILY MEDICINE | Facility: CLINIC | Age: 63
End: 2023-07-31
Payer: COMMERCIAL

## 2023-07-31 VITALS
WEIGHT: 195 LBS | DIASTOLIC BLOOD PRESSURE: 77 MMHG | SYSTOLIC BLOOD PRESSURE: 119 MMHG | TEMPERATURE: 98.7 F | HEIGHT: 69 IN | RESPIRATION RATE: 16 BRPM | BODY MASS INDEX: 28.88 KG/M2 | OXYGEN SATURATION: 95 % | HEART RATE: 54 BPM

## 2023-07-31 DIAGNOSIS — Z00.00 ROUTINE GENERAL MEDICAL EXAMINATION AT A HEALTH CARE FACILITY: Primary | ICD-10-CM

## 2023-07-31 DIAGNOSIS — B35.3 TINEA PEDIS OF BOTH FEET: ICD-10-CM

## 2023-07-31 DIAGNOSIS — Z12.11 SCREEN FOR COLON CANCER: ICD-10-CM

## 2023-07-31 DIAGNOSIS — J30.2 SEASONAL ALLERGIES: ICD-10-CM

## 2023-07-31 DIAGNOSIS — B00.9 HERPES SIMPLEX VIRUS INFECTION: ICD-10-CM

## 2023-07-31 DIAGNOSIS — Z86.0100 HISTORY OF COLONIC POLYPS: ICD-10-CM

## 2023-07-31 DIAGNOSIS — E83.52 SERUM CALCIUM ELEVATED: ICD-10-CM

## 2023-07-31 LAB
ANION GAP SERPL CALCULATED.3IONS-SCNC: 12 MMOL/L (ref 7–15)
BUN SERPL-MCNC: 22.3 MG/DL (ref 8–23)
CALCIUM SERPL-MCNC: 11.2 MG/DL (ref 8.8–10.2)
CHLORIDE SERPL-SCNC: 104 MMOL/L (ref 98–107)
CHOLEST SERPL-MCNC: 220 MG/DL
CREAT SERPL-MCNC: 0.75 MG/DL (ref 0.51–0.95)
DEPRECATED HCO3 PLAS-SCNC: 22 MMOL/L (ref 22–29)
ERYTHROCYTE [DISTWIDTH] IN BLOOD BY AUTOMATED COUNT: 13.5 % (ref 10–15)
GFR SERPL CREATININE-BSD FRML MDRD: 90 ML/MIN/1.73M2
GLUCOSE SERPL-MCNC: 91 MG/DL (ref 70–99)
HCT VFR BLD AUTO: 43.6 % (ref 35–47)
HDLC SERPL-MCNC: 77 MG/DL
HGB BLD-MCNC: 14.3 G/DL (ref 11.7–15.7)
LDLC SERPL CALC-MCNC: 132 MG/DL
MCH RBC QN AUTO: 30.2 PG (ref 26.5–33)
MCHC RBC AUTO-ENTMCNC: 32.8 G/DL (ref 31.5–36.5)
MCV RBC AUTO: 92 FL (ref 78–100)
NONHDLC SERPL-MCNC: 143 MG/DL
PLATELET # BLD AUTO: 231 10E3/UL (ref 150–450)
POTASSIUM SERPL-SCNC: 4.3 MMOL/L (ref 3.4–5.3)
RBC # BLD AUTO: 4.73 10E6/UL (ref 3.8–5.2)
SODIUM SERPL-SCNC: 138 MMOL/L (ref 136–145)
TRIGL SERPL-MCNC: 54 MG/DL
WBC # BLD AUTO: 7.6 10E3/UL (ref 4–11)

## 2023-07-31 PROCEDURE — 85027 COMPLETE CBC AUTOMATED: CPT | Performed by: FAMILY MEDICINE

## 2023-07-31 PROCEDURE — 83735 ASSAY OF MAGNESIUM: CPT | Performed by: FAMILY MEDICINE

## 2023-07-31 PROCEDURE — 99396 PREV VISIT EST AGE 40-64: CPT | Performed by: FAMILY MEDICINE

## 2023-07-31 PROCEDURE — 36415 COLL VENOUS BLD VENIPUNCTURE: CPT | Performed by: FAMILY MEDICINE

## 2023-07-31 PROCEDURE — 80048 BASIC METABOLIC PNL TOTAL CA: CPT | Performed by: FAMILY MEDICINE

## 2023-07-31 PROCEDURE — 99213 OFFICE O/P EST LOW 20 MIN: CPT | Mod: 25 | Performed by: FAMILY MEDICINE

## 2023-07-31 PROCEDURE — 80061 LIPID PANEL: CPT | Performed by: FAMILY MEDICINE

## 2023-07-31 PROCEDURE — 84100 ASSAY OF PHOSPHORUS: CPT | Performed by: FAMILY MEDICINE

## 2023-07-31 PROCEDURE — 82306 VITAMIN D 25 HYDROXY: CPT | Performed by: FAMILY MEDICINE

## 2023-07-31 RX ORDER — PRENATAL VIT 91/IRON/FOLIC/DHA 28-975-200
COMBINATION PACKAGE (EA) ORAL 2 TIMES DAILY
Qty: 42 G | Refills: 4 | Status: SHIPPED | OUTPATIENT
Start: 2023-07-31

## 2023-07-31 RX ORDER — ACYCLOVIR 400 MG/1
TABLET ORAL
Qty: 120 TABLET | Refills: 3 | Status: SHIPPED | OUTPATIENT
Start: 2023-07-31

## 2023-07-31 RX ORDER — CETIRIZINE HYDROCHLORIDE 10 MG/1
10 TABLET ORAL DAILY
Qty: 90 TABLET | Refills: 3 | Status: SHIPPED | OUTPATIENT
Start: 2023-07-31

## 2023-07-31 ASSESSMENT — ENCOUNTER SYMPTOMS
CHILLS: 0
CONSTIPATION: 0
FEVER: 0
HEADACHES: 0
DIZZINESS: 0
COUGH: 0
FREQUENCY: 0
NERVOUS/ANXIOUS: 0
SORE THROAT: 0
BREAST MASS: 0
ARTHRALGIAS: 0
NAUSEA: 0
ABDOMINAL PAIN: 0
DIARRHEA: 0
DYSURIA: 0
WEAKNESS: 0
HEARTBURN: 0
PARESTHESIAS: 0
HEMATOCHEZIA: 0
SHORTNESS OF BREATH: 0
PALPITATIONS: 0
EYE PAIN: 0
HEMATURIA: 0
JOINT SWELLING: 0
MYALGIAS: 0

## 2023-07-31 ASSESSMENT — PAIN SCALES - GENERAL: PAINLEVEL: NO PAIN (0)

## 2023-07-31 NOTE — PROGRESS NOTES
SUBJECTIVE:   CC: Mary is an 63 year old who presents for preventive health visit.       2023     2:28 PM   Additional Questions   Roomed by aries       Healthy Habits:     Getting at least 3 servings of Calcium per day:  Yes    Bi-annual eye exam:  Yes    Dental care twice a year:  Yes    Sleep apnea or symptoms of sleep apnea:  None    Diet:  Regular (no restrictions) and Carbohydrate counting    Frequency of exercise:  4-5 days/week    Duration of exercise:  45-60 minutes    Taking medications regularly:  Yes    Medication side effects:  None    Additional concerns today:  No      Today's PHQ-2 Score:       2023     2:19 PM   PHQ-2 (  [employer])   Q1: Little interest or pleasure in doing things 0   Q2: Feeling down, depressed or hopeless 0   PHQ-2 Score 0   Q1: Little interest or pleasure in doing things Not at all   Q2: Feeling down, depressed or hopeless Not at all   PHQ-2 Score 0       Needs refill of med for herpes and cream for athletes foot  No other concerns today  They are working okay                Social History     Tobacco Use    Smoking status: Former    Smokeless tobacco: Never   Substance Use Topics    Alcohol use: Yes             2023     2:19 PM   Alcohol Use   Prescreen: >3 drinks/day or >7 drinks/week? Not Applicable          No data to display              Reviewed orders with patient.  Reviewed health maintenance and updated orders accordingly - Yes  Lab work is in process    Breast Cancer Screenin/26/2022     7:52 AM   Breast CA Risk Assessment (FHS-7)   Do you have a family history of breast, colon, or ovarian cancer? No / Unknown         Mammogram Screening: Recommended mammography every 1-2 years with patient discussion and risk factor consideration  Pertinent mammograms are reviewed under the imaging tab.    History of abnormal Pap smear: NO - age 30-65 PAP every 5 years with negative HPV co-testing recommended      Latest Ref Rng & Units 3/11/2020      6:40 PM 3/11/2020     6:39 PM   PAP / HPV   PAP (Historical)   NIL    HPV 16 DNA NEG^Negative Negative     HPV 18 DNA NEG^Negative Negative     Other HR HPV NEG^Negative Negative       Reviewed and updated as needed this visit by clinical staff   Tobacco  Allergies  Meds              Reviewed and updated as needed this visit by Provider                     Review of Systems   Constitutional:  Negative for chills and fever.   HENT:  Negative for congestion, ear pain, hearing loss and sore throat.    Eyes:  Negative for pain and visual disturbance.   Respiratory:  Negative for cough and shortness of breath.    Cardiovascular:  Negative for chest pain, palpitations and peripheral edema.   Gastrointestinal:  Negative for abdominal pain, constipation, diarrhea, heartburn, hematochezia and nausea.   Breasts:  Negative for tenderness, breast mass and discharge.   Genitourinary:  Negative for dysuria, frequency, genital sores, hematuria, pelvic pain, urgency, vaginal bleeding and vaginal discharge.   Musculoskeletal:  Negative for arthralgias, joint swelling and myalgias.   Skin:  Negative for rash.   Neurological:  Negative for dizziness, weakness, headaches and paresthesias.   Psychiatric/Behavioral:  Negative for mood changes. The patient is not nervous/anxious.      CONSTITUTIONAL: NEGATIVE for fever, chills, change in weight  INTEGUMENTARY/SKIN: NEGATIVE for worrisome rashes, moles or lesions  EYES: NEGATIVE for vision changes or irritation  ENT: NEGATIVE for ear, mouth and throat problems  RESP: NEGATIVE for significant cough or SOB  BREAST: NEGATIVE for masses, tenderness or discharge  CV: NEGATIVE for chest pain, palpitations or peripheral edema  GI: NEGATIVE for nausea, abdominal pain, heartburn, or change in bowel habits  : NEGATIVE for unusual urinary or vaginal symptoms. No vaginal bleeding.  MUSCULOSKELETAL: NEGATIVE for significant arthralgias or myalgia  NEURO: NEGATIVE for weakness, dizziness or  "paresthesias  PSYCHIATRIC: NEGATIVE for changes in mood or affect      OBJECTIVE:   /77   Pulse 54   Temp 98.7  F (37.1  C) (Oral)   Resp 16   Ht 1.753 m (5' 9\")   Wt 88.5 kg (195 lb)   SpO2 95%   BMI 28.80 kg/m    Physical Exam  GENERAL: healthy, alert and no distress  EYES: Eyes grossly normal to inspection, PERRL and conjunctivae and sclerae normal  HENT: ear canals and TM's normal, nose and mouth without ulcers or lesions  NECK: no adenopathy, no asymmetry, masses, or scars and thyroid normal to palpation  RESP: lungs clear to auscultation - no rales, rhonchi or wheezes  BREAST: normal without masses, tenderness or nipple discharge and no palpable axillary masses or adenopathy  CV: regular rate and rhythm, normal S1 S2, no S3 or S4, no murmur, click or rub, no peripheral edema and peripheral pulses strong  ABDOMEN: soft, nontender, no hepatosplenomegaly, no masses and bowel sounds normal  MS: no gross musculoskeletal defects noted, no edema  SKIN: no suspicious lesions or rashes  NEURO: Normal strength and tone, mentation intact and speech normal  PSYCH: mentation appears normal, affect normal/bright    Diagnostic Test Results:  Labs reviewed in Epic  Results for orders placed or performed in visit on 07/31/23 (from the past 24 hour(s))   **CBC with platelets FUTURE 2mo   Result Value Ref Range    WBC Count 7.6 4.0 - 11.0 10e3/uL    RBC Count 4.73 3.80 - 5.20 10e6/uL    Hemoglobin 14.3 11.7 - 15.7 g/dL    Hematocrit 43.6 35.0 - 47.0 %    MCV 92 78 - 100 fL    MCH 30.2 26.5 - 33.0 pg    MCHC 32.8 31.5 - 36.5 g/dL    RDW 13.5 10.0 - 15.0 %    Platelet Count 231 150 - 450 10e3/uL       ASSESSMENT/PLAN:   (Z00.00) Routine general medical examination at a health care facility  (primary encounter diagnosis)  Comment:   Plan: **CBC with platelets FUTURE 2mo, Lipid panel         reflex to direct LDL Non-fasting, **Basic         metabolic panel FUTURE 2mo            (J30.2) Seasonal allergies  Comment: " "refill  Plan: cetirizine (ZYRTEC) 10 MG tablet            (B00.9) Herpes simplex virus infection  Comment: refill as is working well  Plan: acyclovir (ZOVIRAX) 400 MG tablet            (B35.3) Tinea pedis of both feet  Comment:   Plan: terbinafine (LAMISIL) 1 % external cream            (Z86.010) History of colonic polyps  Comment:   Plan: Colonoscopy Screening  Referral            (Z12.11) Screen for colon cancer  Comment:   Plan:     Patient has been advised of split billing requirements and indicates understanding: Yes      COUNSELING:  Reviewed preventive health counseling, as reflected in patient instructions       Regular exercise       Healthy diet/nutrition       Vision screening       Colorectal Cancer Screening      BMI:   Estimated body mass index is 28.8 kg/m  as calculated from the following:    Height as of this encounter: 1.753 m (5' 9\").    Weight as of this encounter: 88.5 kg (195 lb).   Weight management plan: Discussed healthy diet and exercise guidelines      She reports that she has quit smoking. She has never used smokeless tobacco.          Carol Jaeger MD  [facility]  "

## 2023-08-02 LAB
MAGNESIUM SERPL-MCNC: 1.9 MG/DL (ref 1.7–2.3)
PHOSPHATE SERPL-MCNC: 3.8 MG/DL (ref 2.5–4.5)

## 2023-08-03 ENCOUNTER — DOCUMENTATION ONLY (OUTPATIENT)
Dept: FAMILY MEDICINE | Facility: CLINIC | Age: 63
End: 2023-08-03
Payer: COMMERCIAL

## 2023-08-03 NOTE — PROGRESS NOTES
The add-on test FROM 07.31.23 that was requested on 08.03.23 is unable to be completed due to specimen stability.  The tube needed to be centrifuge within 24 hours after collection. Future order is available for collection. If labs are needed before next appointment, please arrange for collection.

## 2023-08-04 LAB — DEPRECATED CALCIDIOL+CALCIFEROL SERPL-MC: 33 UG/L (ref 20–75)

## 2023-08-09 ENCOUNTER — LAB (OUTPATIENT)
Dept: LAB | Facility: CLINIC | Age: 63
End: 2023-08-09
Payer: COMMERCIAL

## 2023-08-09 DIAGNOSIS — E83.52 SERUM CALCIUM ELEVATED: ICD-10-CM

## 2023-08-09 PROCEDURE — 83970 ASSAY OF PARATHORMONE: CPT

## 2023-08-09 PROCEDURE — 36415 COLL VENOUS BLD VENIPUNCTURE: CPT

## 2023-08-10 LAB — PTH-INTACT SERPL-MCNC: 90 PG/ML (ref 15–65)

## 2023-08-18 ENCOUNTER — ANCILLARY PROCEDURE (OUTPATIENT)
Dept: BONE DENSITY | Facility: CLINIC | Age: 63
End: 2023-08-18
Attending: FAMILY MEDICINE
Payer: COMMERCIAL

## 2023-08-18 DIAGNOSIS — Z78.0 ASYMPTOMATIC POSTMENOPAUSAL STATUS: ICD-10-CM

## 2023-08-18 DIAGNOSIS — E83.52 SERUM CALCIUM ELEVATED: ICD-10-CM

## 2023-08-18 PROCEDURE — 77081 DXA BONE DENSITY APPENDICULR: CPT | Performed by: INTERNAL MEDICINE

## 2023-08-18 PROCEDURE — 77080 DXA BONE DENSITY AXIAL: CPT | Mod: 59 | Performed by: INTERNAL MEDICINE

## 2023-08-21 ENCOUNTER — APPOINTMENT (OUTPATIENT)
Dept: URBAN - METROPOLITAN AREA CLINIC 252 | Age: 63
Setting detail: DERMATOLOGY
End: 2023-08-28

## 2023-08-21 VITALS — WEIGHT: 190 LBS | RESPIRATION RATE: 16 BRPM | HEIGHT: 70 IN

## 2023-08-21 DIAGNOSIS — L82.1 OTHER SEBORRHEIC KERATOSIS: ICD-10-CM

## 2023-08-21 PROCEDURE — OTHER LIQUID NITROGEN (COSMETIC): OTHER

## 2023-08-21 PROCEDURE — OTHER COUNSELING: OTHER

## 2023-08-21 ASSESSMENT — LOCATION ZONE DERM
LOCATION ZONE: TRUNK
LOCATION ZONE: SCALP
LOCATION ZONE: NECK
LOCATION ZONE: ARM

## 2023-08-21 ASSESSMENT — LOCATION DETAILED DESCRIPTION DERM
LOCATION DETAILED: RIGHT SUPERIOR PARIETAL SCALP
LOCATION DETAILED: RIGHT ANTERIOR SHOULDER
LOCATION DETAILED: RIGHT POSTERIOR SHOULDER
LOCATION DETAILED: LEFT VENTRAL PROXIMAL FOREARM
LOCATION DETAILED: RIGHT SUPERIOR MEDIAL UPPER BACK
LOCATION DETAILED: RIGHT LATERAL TRAPEZIAL NECK
LOCATION DETAILED: RIGHT SUPERIOR OCCIPITAL SCALP
LOCATION DETAILED: LEFT ANTERIOR MEDIAL DISTAL UPPER ARM
LOCATION DETAILED: LEFT ANTERIOR SHOULDER
LOCATION DETAILED: LEFT MEDIAL FRONTAL SCALP
LOCATION DETAILED: LEFT SUPERIOR PARIETAL SCALP
LOCATION DETAILED: LEFT POSTERIOR SHOULDER

## 2023-08-21 ASSESSMENT — LOCATION SIMPLE DESCRIPTION DERM
LOCATION SIMPLE: POSTERIOR NECK
LOCATION SIMPLE: RIGHT SHOULDER
LOCATION SIMPLE: RIGHT OCCIPITAL SCALP
LOCATION SIMPLE: LEFT FOREARM
LOCATION SIMPLE: LEFT SCALP
LOCATION SIMPLE: LEFT UPPER ARM
LOCATION SIMPLE: LEFT SHOULDER
LOCATION SIMPLE: SCALP
LOCATION SIMPLE: RIGHT UPPER BACK

## 2023-08-21 NOTE — PROCEDURE: LIQUID NITROGEN (COSMETIC)
Price (Use Numbers Only, No Special Characters Or $): 150
Post-Care Instructions: I reviewed with the patient in detail post-care instructions. Patient is to wear sunprotection, and avoid picking at any of the treated lesions. Pt may apply Vaseline to crusted or scabbing areas.
Detail Level: Detailed
Render Post-Care Instructions In Note?: no
Consent: The patient's consent was obtained including but not limited to risks of crusting, scabbing, blistering, scarring, darker or lighter pigmentary change, recurrence, incomplete removal and infection. The patient understands that the procedure is cosmetic in nature and is not covered by insurance.
Show Spray Paint Technique Variable?: Yes
Spray Paint Text: The liquid nitrogen was applied to the skin utilizing a spray paint frosting technique.
Billing Information: Bill by Static Price

## 2023-09-01 ENCOUNTER — ANCILLARY PROCEDURE (OUTPATIENT)
Dept: GENERAL RADIOLOGY | Facility: CLINIC | Age: 63
End: 2023-09-01
Attending: PHYSICIAN ASSISTANT
Payer: COMMERCIAL

## 2023-09-01 ENCOUNTER — OFFICE VISIT (OUTPATIENT)
Dept: URGENT CARE | Facility: URGENT CARE | Age: 63
End: 2023-09-01
Payer: COMMERCIAL

## 2023-09-01 VITALS
TEMPERATURE: 97.7 F | HEART RATE: 56 BPM | BODY MASS INDEX: 28.72 KG/M2 | OXYGEN SATURATION: 98 % | DIASTOLIC BLOOD PRESSURE: 85 MMHG | SYSTOLIC BLOOD PRESSURE: 126 MMHG | WEIGHT: 194.5 LBS

## 2023-09-01 DIAGNOSIS — M25.552 HIP PAIN, LEFT: ICD-10-CM

## 2023-09-01 DIAGNOSIS — R10.32 LEFT GROIN PAIN: ICD-10-CM

## 2023-09-01 DIAGNOSIS — M25.552 HIP PAIN, LEFT: Primary | ICD-10-CM

## 2023-09-01 DIAGNOSIS — M75.22 BICEPS TENDONITIS, LEFT: ICD-10-CM

## 2023-09-01 PROCEDURE — 73502 X-RAY EXAM HIP UNI 2-3 VIEWS: CPT | Mod: TC | Performed by: RADIOLOGY

## 2023-09-01 PROCEDURE — 99214 OFFICE O/P EST MOD 30 MIN: CPT | Performed by: PHYSICIAN ASSISTANT

## 2023-09-01 RX ORDER — NAPROXEN 500 MG/1
500 TABLET ORAL 2 TIMES DAILY WITH MEALS
Qty: 20 TABLET | Refills: 0 | Status: SHIPPED | OUTPATIENT
Start: 2023-09-01 | End: 2023-09-11

## 2023-09-01 NOTE — PROGRESS NOTES
Chief Complaint   Patient presents with    Leg Pain     Pt c/o of  pain in left leg noticed it 1-2 weeks, pt injured herself while gardening, worsening, certain motions causes pain    Shoulder Pain     Left shoulder is weak               ASSESSMENT:     ICD-10-CM    1. Hip pain, left  M25.552 naproxen (NAPROSYN) 500 MG tablet     Orthopedic  Referral     XR Pelvis w Hip Left G/E 2 Views      2. Left groin pain  R10.32 naproxen (NAPROSYN) 500 MG tablet     Orthopedic  Referral     XR Pelvis w Hip Left G/E 2 Views      3. Biceps tendonitis, left  M75.22 naproxen (NAPROSYN) 500 MG tablet     Orthopedic  Referral     XR Pelvis w Hip Left G/E 2 Views            PLAN: Left biceps tendinitis.  Left hip/groin pain.  Consider lumbar neuro impingement, hip arthritis, hip bursitis, groin strain/sprain.  Naproxen twice daily with food for at least 5 days but up to 10 days.  Only tried over-the-counter ibuprofen 600 mg a day for 3 days.  Try ice and/or heat.  Ortho follow-up.    I have discussed clinical findings with patient.  Side effects of medications discussed.  Symptomatic care is discussed.  I have discussed the possibility of  worsening symptoms and indication to RTC or go to the ER if they occur.  All questions are answered, patient indicates understanding of these issues and is in agreement with plan.   Patient care instructions are discussed/given at the end of visit.       Vanda Lacey PA-C      SUBJECTIVE:  3-year-old female presents for left shoulder discomfort and left hip/groin pain.  Onset 2 weeks ago after shoveling bricks and rock from an old farm house to form a garden.  Shoveled and remove rocks and [place] for approximately 4 days.  Pain in the left hip and groin area that does radiate down the anterior thigh.  Does not go past the knee.  No midline low back pain.  Also left biceps has been has been feeling weak.  No chest pain, shortness of breath, headache,  dizziness.      Allergies   Allergen Reactions    Levaquin [Levofloxacin] Other (See Comments)     tendonitis    Penicillins Rash       No past medical history on file.    acyclovir (ZOVIRAX) 400 MG tablet, TAKE 1 TABLET BY MOUTH EVERY DAY MAY TAKE 1 TAB TWICE DAILY FOR 5 DAYS FOR BREAK OUTS.  cetirizine (ZYRTEC) 10 MG tablet, Take 1 tablet (10 mg) by mouth daily Needs to be seen for further refills  terbinafine (LAMISIL) 1 % external cream, Apply topically 2 times daily  tolnaftate (LAMISIL) 1 % external spray, Externally apply topically 2 times daily To feet as needed  zolpidem (AMBIEN) 5 MG tablet, Take 1 tablet (5 mg) by mouth nightly as needed for sleep    No current facility-administered medications on file prior to visit.      Social History     Tobacco Use    Smoking status: Former    Smokeless tobacco: Never   Substance Use Topics    Alcohol use: Yes       ROS:  CONSTITUTIONAL: Negative for fatigue or fever.  EYES: Negative for eye problems.  ENT: Neg for ST.  RESP: Neg for SOB.  CV: Negative for chest pains.  GI: Negative for vomiting.  MUSCULOSKELETAL:  as above.  NEUROLOGIC: Negative for headaches.  SKIN: Negative for rash.  PSYCH: Normal mentation for age.    OBJECTIVE:  /85 (BP Location: Left arm, Patient Position: Sitting, Cuff Size: Adult Regular)   Pulse 56   Temp 97.7  F (36.5  C) (Tympanic)   Wt 88.2 kg (194 lb 8 oz)   SpO2 98%   BMI 28.72 kg/m    GENERAL APPEARANCE: Healthy, alert and no distress.  EYES:Conjunctiva/sclera clear.  EARS: No cerumen.   Ear canals w/o erythema.  TM's intact w/o erythema.    NOSE/MOUTH: Nose without ulcers, erythema or lesions.  SINUSES: No maxillary sinus tenderness.  THROAT: No erythema w/o tonsillar enlargement . No exudates.    NECK: Supple, full range of motion.  Some tenderness left posterior occipital groove.    Left shoulder tender over the bicipital groove.  Some decreased internal rotation of the left shoulder.  Full forward flexion and  abduction/AB duction.  Strength intact upper extremities.  Negative impingement sign.    RESP: Lungs clear to auscultation - no rales, rhonchi or wheezes  CV: Regular rate and rhythm, normal S1 S2, no murmur noted.  NEURO: Awake, alert    SKIN: No rashes    Lumbar spine nontender with full range of motion.  Tender over left trochanteric area.  Tender left groin.  No palpable masses.  Increased pain with internal and external rotation of the left hip.  Had patient lie on her right hip and extending her left leg backwards.  This caused some pain into the groin and anterior thigh.    DTR symmetric and physiologic throughout upper and lower extremities.  Sensation to soft touch intact upper and lower extremities.  Strength intact upper and lower extremities.  Good palpable radial and dorsalis pedis pulses on the left.

## 2023-09-07 NOTE — PROGRESS NOTES
"chief complaint:   Chief Complaint   Patient presents with    Left Hip - Pain       HISTORY OF PRESENT ILLNESS    Mary Irizarry is a 63 year old female seen for evaluation of a left hip pain/injury that occurred 3 weeks ago. She was shoveling bricks and rock from an old farm house to form a garden. Shoveled and remove rocks for approximately 4 days. Pain in the left hip and groin area that does radiate down the anterior thigh. Does not go past the knee. No midline low back pain.   No \"event\" with pain, just noticed after the project was over. 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    Surgical Hx:  has a past surgical history that includes ENT surgery; Soft tissue surgery; and Lasik.    Medications:   Current Outpatient Medications:     acyclovir (ZOVIRAX) 400 MG tablet, TAKE 1 TABLET BY MOUTH EVERY DAY MAY TAKE 1 TAB TWICE DAILY FOR 5 DAYS FOR BREAK OUTS., Disp: 120 tablet, Rfl: 3    cetirizine (ZYRTEC) 10 MG tablet, Take 1 tablet (10 mg) by mouth daily Needs to be seen for further refills, Disp: 90 tablet, Rfl: 3    naproxen (NAPROSYN) 500 MG tablet, Take 1 tablet (500 mg) by mouth 2 times daily (with meals) for 10 days, Disp: 20 tablet, Rfl: 0    terbinafine (LAMISIL) 1 % external cream, Apply topically 2 times daily, Disp: 42 g, Rfl: 4    tolnaftate (LAMISIL) 1 % external spray, Externally apply topically 2 times daily To feet as needed, Disp: 138 g, Rfl: 4    zolpidem (AMBIEN) 5 MG tablet, Take 1 tablet (5 mg) by mouth nightly as needed for sleep, Disp: 30 tablet, Rfl: 1    Allergies:   Allergies   Allergen Reactions    Levaquin [Levofloxacin] Other (See Comments)     tendonitis    Penicillins Rash       Social Hx:  reports that she has quit smoking. She has never used smokeless tobacco. She reports current alcohol use. She reports that she " "does not use drugs.    Family Hx: family history includes Diabetes in her father.    REVIEW OF SYSTEMS:   ROS: 10 point ROS neg other than the symptoms noted above in the HPI and PAST MEDICAL HISTORY. Notables,  CONSTITUTIONAL:NEGATIVE for fever, chills, change in weight  INTEGUMENTARY/SKIN: NEGATIVE for worrisome rashes, moles or lesions  MUSCULOSKELETAL:See HPI above  NEURO: NEGATIVE for weakness, dizziness or paresthesias    PHYSICAL EXAM:  Ht 1.753 m (5' 9\")   Wt 88 kg (194 lb)   BMI 28.65 kg/m     GENERAL APPEARANCE: healthy, alert, no distress  SKIN: no suspicious lesions or rashes  NEURO: Normal strength and tone, mentation intact and speech normal  PSYCH:  mentation appears normal and affect normal/bright  RESPIRATORY: No increased work of breathing.    BILATERAL LOWER EXTREMITIES:  Gait: antalgic favoring left  Neutral to slight varus alignment.  No gross deformities or masses.  No Quad atrophy, strength normal.  Intact sensation deep peroneal nerve, superficial peroneal nerve, med/lat tibial nerve, sural nerve, saphenous nerve  Intact EHL, EDL, TA, FHL, GS, quadriceps hamstrings and hip flexors  Toes warm and well perfused, brisk capillary refill. Palpable 2+ dp pulses.  Bilateral calf soft and nttp or squeeze.  No palpable inquinal lymphadenopathy.  DTRs: achilles 2+, patella 2+.  Edema: none        LEFT HIP EXAM:      Palpation: Tender:   hip flexor, greater trochanteric bursa   Strength:  full strength  Special tests:  no crepitation/snapping over central inguinal region, no crepitation/snapping over greater trochanter, negative Rafael's, positive IT band tightness, no iliopsoas tightness, logrolling -negative, JCAKELYN neg, FADER neg  Hip range of motion: normal, all pain free, tight (mild discomfort) with internal rotation  Leg lengths: not tested    Lumbar range of motion: flexion to touch knees  Toe stand: Able  Heel stand: Able    Seated SLR: able  Supine SLR: able  Sensation:normal  Motor: all " normal      RIGHT HIP EXAM:    Palpation: Tender:   all nontender  Strength:  full strength  Hip range of motion: normal, all pain free  Leg lengths: not tested    Lumbar range of motion: flexion to touch mid shin  Toe stand: Able  Heel stand: Able  Seated SLR: able  Supine SLR: able  Sensation:normal  Motor: all normal      X-RAY:  AP pelvis and hip left hip from 9/1/2023 were reviewed in clinic today.   IMPRESSION:  1.  Normal left hip joint space and alignment.  2.  No fracture.  3.  Degenerative narrowing of the lumbosacral interspace.      Impression: 64 y/o female with left sided hip flexor tendinitis and greater trochanteric bursitis    Plan:   Activity modification -continue yoga and stretching hip flexor. Exercies demonstrated  NSAIDS -relafen sent to pharmacy of choice  PT ordered  Continue chiropractor as desired  Injections: Cortisone for greater trochanteric bursa. See note below  Return to clinic as needed      Freedom Chapman PA-C, CAQ-OS  Dept. of Orthopedic Surgery  [facility]      Large Joint Injection/Arthocentesis: L greater trochanteric bursa    Date/Time: 9/8/2023 11:44 AM    Performed by: Freedom Chapman PA  Authorized by: Freedom Chapman PA    Indications:  Pain  Needle Size:  22 G  Approach:  Lateral  Location:  Hip      Site:  L greater trochanteric bursa  Medications:  80 mg triamcinolone 40 MG/ML; 4 mL BUPivacaine 0.25 %  Outcome:  Tolerated well, no immediate complications  Procedure discussed: discussed risks, benefits, and alternatives    Consent Given by:  Patient  Prep: patient was prepped and draped in usual sterile fashion

## 2023-09-08 ENCOUNTER — OFFICE VISIT (OUTPATIENT)
Dept: ORTHOPEDICS | Facility: CLINIC | Age: 63
End: 2023-09-08
Attending: PHYSICIAN ASSISTANT
Payer: COMMERCIAL

## 2023-09-08 VITALS — BODY MASS INDEX: 28.73 KG/M2 | HEIGHT: 69 IN | WEIGHT: 194 LBS

## 2023-09-08 DIAGNOSIS — M70.62 TROCHANTERIC BURSITIS OF LEFT HIP: ICD-10-CM

## 2023-09-08 DIAGNOSIS — M25.552 HIP PAIN, LEFT: ICD-10-CM

## 2023-09-08 DIAGNOSIS — M76.892 HIP FLEXOR TENDINITIS, LEFT: Primary | ICD-10-CM

## 2023-09-08 PROCEDURE — 99203 OFFICE O/P NEW LOW 30 MIN: CPT | Mod: 25 | Performed by: PHYSICIAN ASSISTANT

## 2023-09-08 PROCEDURE — 20610 DRAIN/INJ JOINT/BURSA W/O US: CPT | Mod: LT | Performed by: PHYSICIAN ASSISTANT

## 2023-09-08 RX ORDER — BUPIVACAINE HYDROCHLORIDE 2.5 MG/ML
4 INJECTION, SOLUTION INFILTRATION; PERINEURAL
Status: SHIPPED | OUTPATIENT
Start: 2023-09-08

## 2023-09-08 RX ORDER — TRIAMCINOLONE ACETONIDE 40 MG/ML
80 INJECTION, SUSPENSION INTRA-ARTICULAR; INTRAMUSCULAR
Status: SHIPPED | OUTPATIENT
Start: 2023-09-08

## 2023-09-08 RX ADMIN — BUPIVACAINE HYDROCHLORIDE 4 ML: 2.5 INJECTION, SOLUTION INFILTRATION; PERINEURAL at 11:44

## 2023-09-08 RX ADMIN — TRIAMCINOLONE ACETONIDE 80 MG: 40 INJECTION, SUSPENSION INTRA-ARTICULAR; INTRAMUSCULAR at 11:44

## 2023-09-08 ASSESSMENT — PAIN SCALES - GENERAL: PAINLEVEL: MODERATE PAIN (5)

## 2023-09-08 NOTE — LETTER
"    9/8/2023         RE: Mary Irizarry  [address] [facility] [address]        Dear Colleague,    Thank you for referring your patient, Mary Irizarry, to the [facility]. Please see a copy of my visit note below.    chief complaint:   Chief Complaint   Patient presents with     Left Hip - Pain       HISTORY OF PRESENT ILLNESS    Mary Irizarry is a 63 year old female seen for evaluation of a left hip pain/injury that occurred 3 weeks ago. She was shoveling bricks and rock from an old farm house to form a garden. Shoveled and remove rocks for approximately 4 days. Pain in the left hip and groin area that does radiate down the anterior thigh. Does not go past the knee. No midline low back pain.   No \"event\" with pain, just noticed after the project was over. 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    Surgical Hx:  has a past surgical history that includes ENT surgery; Soft tissue surgery; and Lasik.    Medications:   Current Outpatient Medications:      acyclovir (ZOVIRAX) 400 MG tablet, TAKE 1 TABLET BY MOUTH EVERY DAY MAY TAKE 1 TAB TWICE DAILY FOR 5 DAYS FOR BREAK OUTS., Disp: 120 tablet, Rfl: 3     cetirizine (ZYRTEC) 10 MG tablet, Take 1 tablet (10 mg) by mouth daily Needs to be seen for further refills, Disp: 90 tablet, Rfl: 3     naproxen (NAPROSYN) 500 MG tablet, Take 1 tablet (500 mg) by mouth 2 times daily (with meals) for 10 days, Disp: 20 tablet, Rfl: 0     terbinafine (LAMISIL) 1 % external cream, Apply topically 2 times daily, Disp: 42 g, Rfl: 4     tolnaftate (LAMISIL) 1 % external spray, Externally apply topically 2 times daily To feet as needed, Disp: 138 g, Rfl: 4     zolpidem (AMBIEN) 5 MG tablet, Take 1 tablet (5 mg) by mouth nightly as needed for sleep, Disp: 30 tablet, Rfl: " "1    Allergies:   Allergies   Allergen Reactions     Levaquin [Levofloxacin] Other (See Comments)     tendonitis     Penicillins Rash       Social Hx:  reports that she has quit smoking. She has never used smokeless tobacco. She reports current alcohol use. She reports that she does not use drugs.    Family Hx: family history includes Diabetes in her father.    REVIEW OF SYSTEMS:   ROS: 10 point ROS neg other than the symptoms noted above in the HPI and PAST MEDICAL HISTORY. Notables,  CONSTITUTIONAL:NEGATIVE for fever, chills, change in weight  INTEGUMENTARY/SKIN: NEGATIVE for worrisome rashes, moles or lesions  MUSCULOSKELETAL:See HPI above  NEURO: NEGATIVE for weakness, dizziness or paresthesias    PHYSICAL EXAM:  Ht 1.753 m (5' 9\")   Wt 88 kg (194 lb)   BMI 28.65 kg/m     GENERAL APPEARANCE: healthy, alert, no distress  SKIN: no suspicious lesions or rashes  NEURO: Normal strength and tone, mentation intact and speech normal  PSYCH:  mentation appears normal and affect normal/bright  RESPIRATORY: No increased work of breathing.    BILATERAL LOWER EXTREMITIES:  Gait: antalgic favoring left  Neutral to slight varus alignment.  No gross deformities or masses.  No Quad atrophy, strength normal.  Intact sensation deep peroneal nerve, superficial peroneal nerve, med/lat tibial nerve, sural nerve, saphenous nerve  Intact EHL, EDL, TA, FHL, GS, quadriceps hamstrings and hip flexors  Toes warm and well perfused, brisk capillary refill. Palpable 2+ dp pulses.  Bilateral calf soft and nttp or squeeze.  No palpable inquinal lymphadenopathy.  DTRs: achilles 2+, patella 2+.  Edema: none        LEFT HIP EXAM:      Palpation: Tender:   hip flexor, greater trochanteric bursa   Strength:  full strength  Special tests:  no crepitation/snapping over central inguinal region, no crepitation/snapping over greater trochanter, negative Rafael's, positive IT band tightness, no iliopsoas tightness, logrolling -negative, JACKELYN neg, FADER " neg  Hip range of motion: normal, all pain free, tight (mild discomfort) with internal rotation  Leg lengths: not tested    Lumbar range of motion: flexion to touch knees  Toe stand: Able  Heel stand: Able    Seated SLR: able  Supine SLR: able  Sensation:normal  Motor: all normal      RIGHT HIP EXAM:    Palpation: Tender:   all nontender  Strength:  full strength  Hip range of motion: normal, all pain free  Leg lengths: not tested    Lumbar range of motion: flexion to touch mid shin  Toe stand: Able  Heel stand: Able  Seated SLR: able  Supine SLR: able  Sensation:normal  Motor: all normal      X-RAY:  AP pelvis and hip left hip from 9/1/2023 were reviewed in clinic today.   IMPRESSION:  1.  Normal left hip joint space and alignment.  2.  No fracture.  3.  Degenerative narrowing of the lumbosacral interspace.      Impression: 64 y/o female with left sided hip flexor tendinitis and greater trochanteric bursitis    Plan:   Activity modification -continue yoga and stretching hip flexor. Exercies demonstrated  NSAIDS -relafen sent to pharmacy of choice  PT ordered  Continue chiropractor as desired  Injections: Cortisone for greater trochanteric bursa. See note below  Return to clinic as needed      Freedom Chapman PA-C, CAQ-OS  Dept. of Orthopedic Surgery  [facility]      Large Joint Injection/Arthocentesis: L greater trochanteric bursa    Date/Time: 9/8/2023 11:44 AM    Performed by: Freedom Chapman PA  Authorized by: Freedom Chapman PA    Indications:  Pain  Needle Size:  22 G  Approach:  Lateral  Location:  Hip      Site:  L greater trochanteric bursa  Medications:  80 mg triamcinolone 40 MG/ML; 4 mL BUPivacaine 0.25 %  Outcome:  Tolerated well, no immediate complications  Procedure discussed: discussed risks, benefits, and alternatives    Consent Given by:  Patient  Prep: patient was prepped and draped in usual sterile fashion               Again, thank you for allowing me to participate in the care of  your patient.        Sincerely,        RANDY Richard

## 2023-09-19 ENCOUNTER — ANCILLARY PROCEDURE (OUTPATIENT)
Dept: MAMMOGRAPHY | Facility: CLINIC | Age: 63
End: 2023-09-19
Attending: FAMILY MEDICINE
Payer: COMMERCIAL

## 2023-09-19 DIAGNOSIS — Z12.31 VISIT FOR SCREENING MAMMOGRAM: ICD-10-CM

## 2023-09-19 PROCEDURE — 77067 SCR MAMMO BI INCL CAD: CPT | Mod: TC | Performed by: RADIOLOGY

## 2023-10-02 ENCOUNTER — TELEPHONE (OUTPATIENT)
Dept: GASTROENTEROLOGY | Facility: CLINIC | Age: 63
End: 2023-10-02
Payer: COMMERCIAL

## 2023-10-02 NOTE — TELEPHONE ENCOUNTER
"Endoscopy Scheduling Screen    Have you had a positive Covid test in the last 14 days?  No    Are you active on MyChart?   Yes    What insurance is in the chart?  Other:  [facility]    Ordering/Referring Provider: CHINMAY COSTA    (If ordering provider performs procedure, schedule with ordering provider unless otherwise instructed. )    BMI: Estimated body mass index is 28.65 kg/m  as calculated from the following:    Height as of 9/8/23: 1.753 m (5' 9\").    Weight as of 9/8/23: 88 kg (194 lb).     Sedation Ordered  moderate sedation.   If patient BMI > 50 do not schedule in [facility].    If patient BMI > 45 do not schedule at [facility].    Are you taking methadone or Suboxone?  No    Are you taking any prescription medications for pain 3 or more times per week?   No    Do you have a history of malignant hyperthermia or adverse reaction to anesthesia?  No    (Females) Are you currently pregnant?   No     Have you been diagnosed or told you have pulmonary hypertension?   No    Do you have an LVAD?  No    Have you been told you have moderate to severe sleep apnea?  No    Have you been told you have COPD, asthma, or any other lung disease?  No    Do you have any heart conditions?  No     Have you ever had an organ transplant?   No    Have you ever had or are you awaiting a heart or lung transplant?   No    Have you had a stroke or transient ischemic attack (TIA aka \"mini stroke\" in the last 6 months?   No    Have you been diagnosed with or been told you have cirrhosis of the liver?   No    Are you currently on dialysis?   No    Do you need assistance transferring?   No    BMI: Estimated body mass index is 28.65 kg/m  as calculated from the following:    Height as of 9/8/23: 1.753 m (5' 9\").    Weight as of 9/8/23: 88 kg (194 lb).     Is patients BMI > 40 and scheduling location [facility]?  No    Do you take an injectable medication for weight loss or diabetes (excluding insulin)?  No    Do you take the medication " Naltrexone?  No    Do you take blood thinners?  No       Prep   Are you currently on dialysis or do you have chronic kidney disease?  No    Do you have a diagnosis of diabetes?  No    Do you have a diagnosis of cystic fibrosis (CF)?  No    On a regular basis do you go 3 -5 days between bowel movements?  No    BMI > 40?  No    Preferred Pharmacy:    [facility]/pharmacy #[id number] - [place], [place] - [address]  [address] [address]  Phone: [phone] Fax: [phone]      Final Scheduling Details   Colonoscopy prep sent?  Standard MiraLAX    Procedure scheduled  Colonoscopy    Surgeon:  aster    Date of procedure:  12/7/23     Pre-OP / PAC:   No - Not required for this site.    Location  [facility] - [facility] - Per order.    Sedation   Moderate Sedation - Per order.      Patient Reminders:   You will receive a call from a Nurse to review instructions and health history.  This assessment must be completed prior to your procedure.  Failure to complete the Nurse assessment may result in the procedure being cancelled.      On the day of your procedure, please designate an adult(s) who can drive you home stay with you for the next 24 hours. The medicines used in the exam will make you sleepy. You will not be able to drive.      You cannot take public transportation, ride share services, or non-medical taxi service without a responsible caregiver.  Medical transport services are allowed with the requirement that a responsible caregiver will receive you at your destination.  We require that drivers and caregivers are confirmed prior to your procedure.

## 2023-11-08 ENCOUNTER — APPOINTMENT (OUTPATIENT)
Dept: URBAN - METROPOLITAN AREA CLINIC 256 | Age: 63
Setting detail: DERMATOLOGY
End: 2023-11-08

## 2023-11-08 DIAGNOSIS — Z41.9 ENCOUNTER FOR PROCEDURE FOR PURPOSES OTHER THAN REMEDYING HEALTH STATE, UNSPECIFIED: ICD-10-CM

## 2023-11-08 ASSESSMENT — LOCATION ZONE DERM: LOCATION ZONE: SCALP

## 2023-11-08 ASSESSMENT — LOCATION DETAILED DESCRIPTION DERM: LOCATION DETAILED: LEFT MEDIAL FRONTAL SCALP

## 2023-11-08 ASSESSMENT — LOCATION SIMPLE DESCRIPTION DERM: LOCATION SIMPLE: LEFT SCALP

## 2023-11-22 ENCOUNTER — APPOINTMENT (OUTPATIENT)
Dept: URBAN - METROPOLITAN AREA CLINIC 256 | Age: 63
Setting detail: DERMATOLOGY
End: 2023-11-22

## 2023-11-22 DIAGNOSIS — Z41.9 ENCOUNTER FOR PROCEDURE FOR PURPOSES OTHER THAN REMEDYING HEALTH STATE, UNSPECIFIED: ICD-10-CM

## 2023-11-22 PROCEDURE — OTHER COSMETIC FOLLOW-UP: OTHER

## 2023-11-27 ENCOUNTER — TELEPHONE (OUTPATIENT)
Dept: GASTROENTEROLOGY | Facility: CLINIC | Age: 63
End: 2023-11-27
Payer: COMMERCIAL

## 2023-11-27 NOTE — TELEPHONE ENCOUNTER
Pre visit planning completed.      Procedure details:    Patient scheduled for Colonoscopy  on 12.07.2023.     Arrival time: 1055. Procedure time 1140    Pre op exam needed? N/A    Facility location: [facility] [place]; [address], [place] [address]    Sedation type: Conscious sedation     Indication for procedure: History of colonic polyps       Chart review:     Electronic implanted devices? No    Recent diagnosis of diverticulitis within the last 6 weeks? No    Diabetic? No    Diabetic medication HOLDING recommendations: (if applicable)  Oral diabetic medications: N/A  Diabetic injectables: N/A  Insulin: N/A      Medication review:    Anticoagulants? No    NSAIDS? Yes.  Nabumetone (Relafen).  Holding interval of 10 days    Other medication HOLDING recommendations:  N/A      Prep for procedure:     Bowel prep recommendation: Standard Miralax   Due to:  standard bowel prep.    Prep instructions sent via [employer]       Jahaira Sandra RN  Endoscopy Procedure Pre Assessment RN  [phone] option 4

## 2023-11-27 NOTE — TELEPHONE ENCOUNTER
Pre assessment completed for upcoming procedure.   (Please see previous telephone encounter notes for complete details)    Patient  returned call.       Procedure details:    Arrival time and facility location reviewed.    Pre op exam needed? N/A    Designated  policy reviewed. Instructed to have someone stay 6 hours post procedure.     COVID policy reviewed.      Medication review:    Medications reviewed. Please see supporting documentation below. Holding recommendations discussed (if applicable).     The Pt reports she is NOT taking Nabumetone.       Prep for procedure:     Procedure prep instructions reviewed.        Additional information needed?  N/A      Patient  verbalized understanding and had no questions or concerns at this time.      Cheli Escoto RN  Endoscopy Procedure Pre Assessment RN  [phone] option 4

## 2023-11-27 NOTE — TELEPHONE ENCOUNTER
Attempted to contact patient in order to complete pre assessment questions.     No answer. Left message to return call to [phone] option 4      Jahaira Sandra RN  Endoscopy Procedure Pre Assessment RN

## 2023-11-30 RX ORDER — PROCHLORPERAZINE MALEATE 10 MG
10 TABLET ORAL EVERY 6 HOURS PRN
Status: CANCELLED | OUTPATIENT
Start: 2023-11-30

## 2023-11-30 RX ORDER — NALOXONE HYDROCHLORIDE 0.4 MG/ML
0.4 INJECTION, SOLUTION INTRAMUSCULAR; INTRAVENOUS; SUBCUTANEOUS
Status: CANCELLED | OUTPATIENT
Start: 2023-11-30

## 2023-11-30 RX ORDER — NALOXONE HYDROCHLORIDE 0.4 MG/ML
0.2 INJECTION, SOLUTION INTRAMUSCULAR; INTRAVENOUS; SUBCUTANEOUS
Status: CANCELLED | OUTPATIENT
Start: 2023-11-30

## 2023-11-30 RX ORDER — ONDANSETRON 4 MG/1
4 TABLET, ORALLY DISINTEGRATING ORAL EVERY 6 HOURS PRN
Status: CANCELLED | OUTPATIENT
Start: 2023-11-30

## 2023-11-30 RX ORDER — FLUMAZENIL 0.1 MG/ML
0.2 INJECTION, SOLUTION INTRAVENOUS
Status: CANCELLED | OUTPATIENT
Start: 2023-11-30 | End: 2023-12-01

## 2023-11-30 RX ORDER — ONDANSETRON 2 MG/ML
4 INJECTION INTRAMUSCULAR; INTRAVENOUS EVERY 6 HOURS PRN
Status: CANCELLED | OUTPATIENT
Start: 2023-11-30

## 2023-12-07 ENCOUNTER — HOSPITAL ENCOUNTER (OUTPATIENT)
Facility: AMBULATORY SURGERY CENTER | Age: 63
Discharge: HOME | End: 2023-12-07
Attending: INTERNAL MEDICINE | Admitting: INTERNAL MEDICINE
Payer: COMMERCIAL

## 2023-12-07 VITALS
TEMPERATURE: 99.1 F | OXYGEN SATURATION: 98 % | HEART RATE: 56 BPM | SYSTOLIC BLOOD PRESSURE: 119 MMHG | RESPIRATION RATE: 16 BRPM | DIASTOLIC BLOOD PRESSURE: 84 MMHG

## 2023-12-07 LAB — COLONOSCOPY: NORMAL

## 2023-12-07 PROCEDURE — G8907 PT DOC NO EVENTS ON DISCHARG: HCPCS

## 2023-12-07 PROCEDURE — G8918 PT W/O PREOP ORDER IV AB PRO: HCPCS

## 2023-12-07 PROCEDURE — G0121 COLON CA SCRN NOT HI RSK IND: HCPCS

## 2023-12-07 RX ORDER — FENTANYL CITRATE 50 UG/ML
INJECTION, SOLUTION INTRAMUSCULAR; INTRAVENOUS PRN
Status: DISCONTINUED | OUTPATIENT
Start: 2023-12-07 | End: 2023-12-07 | Stop reason: HOSPADM

## 2023-12-07 RX ORDER — ONDANSETRON 2 MG/ML
4 INJECTION INTRAMUSCULAR; INTRAVENOUS
Status: DISCONTINUED | OUTPATIENT
Start: 2023-12-07 | End: 2023-12-08 | Stop reason: HOSPADM

## 2023-12-07 RX ORDER — LIDOCAINE 40 MG/G
CREAM TOPICAL
Status: DISCONTINUED | OUTPATIENT
Start: 2023-12-07 | End: 2023-12-08 | Stop reason: HOSPADM

## 2023-12-22 ENCOUNTER — PATIENT OUTREACH (OUTPATIENT)
Dept: GASTROENTEROLOGY | Facility: CLINIC | Age: 63
End: 2023-12-22
Payer: COMMERCIAL

## 2024-02-22 ENCOUNTER — OFFICE VISIT (OUTPATIENT)
Dept: FAMILY MEDICINE | Facility: CLINIC | Age: 64
End: 2024-02-22
Payer: COMMERCIAL

## 2024-02-22 VITALS
TEMPERATURE: 98.7 F | OXYGEN SATURATION: 96 % | BODY MASS INDEX: 28.96 KG/M2 | DIASTOLIC BLOOD PRESSURE: 85 MMHG | WEIGHT: 195.5 LBS | RESPIRATION RATE: 16 BRPM | SYSTOLIC BLOOD PRESSURE: 121 MMHG | HEART RATE: 67 BPM | HEIGHT: 69 IN

## 2024-02-22 DIAGNOSIS — G62.9 PERIPHERAL POLYNEUROPATHY: Primary | ICD-10-CM

## 2024-02-22 DIAGNOSIS — G47.00 INSOMNIA, UNSPECIFIED TYPE: ICD-10-CM

## 2024-02-22 DIAGNOSIS — E83.52 HYPERCALCEMIA: ICD-10-CM

## 2024-02-22 DIAGNOSIS — M25.50 MULTIPLE JOINT PAIN: ICD-10-CM

## 2024-02-22 LAB
ALBUMIN SERPL BCG-MCNC: 4.7 G/DL (ref 3.5–5.2)
ALP SERPL-CCNC: 47 U/L (ref 40–150)
ALT SERPL W P-5'-P-CCNC: 23 U/L (ref 0–50)
ANION GAP SERPL CALCULATED.3IONS-SCNC: 11 MMOL/L (ref 7–15)
AST SERPL W P-5'-P-CCNC: 25 U/L (ref 0–45)
BASOPHILS # BLD AUTO: 0.1 10E3/UL (ref 0–0.2)
BASOPHILS NFR BLD AUTO: 1 %
BILIRUB SERPL-MCNC: 0.4 MG/DL
BUN SERPL-MCNC: 12.6 MG/DL (ref 8–23)
CALCIUM SERPL-MCNC: 11.3 MG/DL (ref 8.8–10.2)
CHLORIDE SERPL-SCNC: 104 MMOL/L (ref 98–107)
CREAT SERPL-MCNC: 0.73 MG/DL (ref 0.51–0.95)
CRP SERPL-MCNC: <3 MG/L
DEPRECATED HCO3 PLAS-SCNC: 25 MMOL/L (ref 22–29)
EGFRCR SERPLBLD CKD-EPI 2021: >90 ML/MIN/1.73M2
EOSINOPHIL # BLD AUTO: 0.1 10E3/UL (ref 0–0.7)
EOSINOPHIL NFR BLD AUTO: 2 %
ERYTHROCYTE [DISTWIDTH] IN BLOOD BY AUTOMATED COUNT: 13.3 % (ref 10–15)
ERYTHROCYTE [SEDIMENTATION RATE] IN BLOOD BY WESTERGREN METHOD: 4 MM/HR (ref 0–30)
GLUCOSE SERPL-MCNC: 98 MG/DL (ref 70–99)
HBA1C MFR BLD: 5.6 % (ref 0–5.6)
HCT VFR BLD AUTO: 45.9 % (ref 35–47)
HGB BLD-MCNC: 15 G/DL (ref 11.7–15.7)
IMM GRANULOCYTES # BLD: 0 10E3/UL
IMM GRANULOCYTES NFR BLD: 0 %
LYMPHOCYTES # BLD AUTO: 1.7 10E3/UL (ref 0.8–5.3)
LYMPHOCYTES NFR BLD AUTO: 32 %
MCH RBC QN AUTO: 29.9 PG (ref 26.5–33)
MCHC RBC AUTO-ENTMCNC: 32.7 G/DL (ref 31.5–36.5)
MCV RBC AUTO: 91 FL (ref 78–100)
MONOCYTES # BLD AUTO: 0.3 10E3/UL (ref 0–1.3)
MONOCYTES NFR BLD AUTO: 6 %
NEUTROPHILS # BLD AUTO: 3.2 10E3/UL (ref 1.6–8.3)
NEUTROPHILS NFR BLD AUTO: 59 %
PLATELET # BLD AUTO: 221 10E3/UL (ref 150–450)
POTASSIUM SERPL-SCNC: 4.4 MMOL/L (ref 3.4–5.3)
PROT SERPL-MCNC: 6.8 G/DL (ref 6.4–8.3)
PTH-INTACT SERPL-MCNC: 82 PG/ML (ref 15–65)
RBC # BLD AUTO: 5.02 10E6/UL (ref 3.8–5.2)
SODIUM SERPL-SCNC: 140 MMOL/L (ref 135–145)
TSH SERPL DL<=0.005 MIU/L-ACNC: 1.36 UIU/ML (ref 0.3–4.2)
VIT B12 SERPL-MCNC: 856 PG/ML (ref 232–1245)
WBC # BLD AUTO: 5.4 10E3/UL (ref 4–11)

## 2024-02-22 PROCEDURE — 99214 OFFICE O/P EST MOD 30 MIN: CPT | Performed by: FAMILY MEDICINE

## 2024-02-22 PROCEDURE — 80053 COMPREHEN METABOLIC PANEL: CPT | Performed by: FAMILY MEDICINE

## 2024-02-22 PROCEDURE — 85652 RBC SED RATE AUTOMATED: CPT | Performed by: FAMILY MEDICINE

## 2024-02-22 PROCEDURE — 83036 HEMOGLOBIN GLYCOSYLATED A1C: CPT | Performed by: FAMILY MEDICINE

## 2024-02-22 PROCEDURE — 82607 VITAMIN B-12: CPT | Performed by: FAMILY MEDICINE

## 2024-02-22 PROCEDURE — 86140 C-REACTIVE PROTEIN: CPT | Performed by: FAMILY MEDICINE

## 2024-02-22 PROCEDURE — 84443 ASSAY THYROID STIM HORMONE: CPT | Performed by: FAMILY MEDICINE

## 2024-02-22 PROCEDURE — 83970 ASSAY OF PARATHORMONE: CPT | Performed by: FAMILY MEDICINE

## 2024-02-22 PROCEDURE — 36415 COLL VENOUS BLD VENIPUNCTURE: CPT | Performed by: FAMILY MEDICINE

## 2024-02-22 PROCEDURE — 85025 COMPLETE CBC W/AUTO DIFF WBC: CPT | Performed by: FAMILY MEDICINE

## 2024-02-22 RX ORDER — ZOLPIDEM TARTRATE 5 MG/1
5 TABLET ORAL
Qty: 30 TABLET | Refills: 1 | Status: SHIPPED | OUTPATIENT
Start: 2024-02-22

## 2024-02-22 ASSESSMENT — PAIN SCALES - GENERAL: PAINLEVEL: NO PAIN (0)

## 2024-02-22 ASSESSMENT — ENCOUNTER SYMPTOMS: NUMBNESS: 1

## 2024-02-22 NOTE — PROGRESS NOTES
"      Laura Hernandez is a 63 year old, presenting for the following health issues:  Numbness (Bilateral feet numbness for the past couple months)        2024     7:39 AM   Additional Questions   Roomed by Tayla Nunn     Numbness  Associated symptoms include numbness.   History of Present Illness       Reason for visit:  Numb, tingling in feet  Symptom onset:  3-4 weeks ago  Symptoms include:  Numb, tingling in feet  Symptom intensity:  Moderate  Symptom progression:  Staying the same  Had these symptoms before:  No  What makes it worse:  Walking  What makes it better:  No    She eats 2-3 servings of fruits and vegetables daily.She consumes 0 sweetened beverage(s) daily. She exercises with enough effort to increase her heart rate 4 days per week.   She is taking medications regularly.           Dug a garden last fall    ?hip issues since then    Went to chiropractor a couple times    Both feet equally    All the time    Tingling and numbness    Mostly ball of feet    Not much up leg    Office work, seated    Tingling not positional    Walking makes it worse    Dad  of diabetes    Only smoked for a few year     Rare etoh    Never drank heavily     2-3 months      No weakness    Tired    Up to date mammogram and colonoscopy    Carb resistant     Wt up         Objective    /85 (BP Location: Left arm, Patient Position: Chair, Cuff Size: Adult Regular)   Pulse 67   Temp 98.7  F (37.1  C) (Temporal)   Resp 16   Ht 1.753 m (5' 9\")   Wt 88.7 kg (195 lb 8 oz)   SpO2 96%   BMI 28.87 kg/m    Body mass index is 28.87 kg/m .  Physical Exam    Full physical not done     Mentation and affect are fine    No tremor of speech or extremity    We did do complete neurologic exam    Sensation/strength in extremities normal except for feet and distal lower legs.  Light touch seems okay but vibration sensation absent on both feet. Romberg, finger nose test, standing on one leg test, heel toe walk all " normal.  Cranial nerves normal.     Heart/ lungs fine.    ASSESSMENT / PLAN:  (G62.9) Peripheral polyneuropathy  (primary encounter diagnosis)  Comment: unclear etiology.  Check labs.  Prudent given exam findings and history to do neurology consult.  Patient will schedule.   Plan: Comprehensive metabolic panel, CBC with         Platelets & Differential, TSH with free T4         reflex, Hemoglobin A1c, Vitamin B12, Adult         Neurology  Referral             (G47.00) Insomnia, unspecified type  Comment: needs refill, only uses rarely   Plan: zolpidem (AMBIEN) 5 MG tablet             (M25.50) Multiple joint pain  Comment: joint pain not real bad    Plan: CRP, inflammation, ESR: Erythrocyte         sedimentation rate        Check labs     (E83.52) Hypercalcemia  Comment: check   Plan: Parathyroid Hormone Intact                 I reviewed the patient's medications, allergies, medical history, family history, and social history.    Jacques Mills MD          Signed Electronically by: Jacques Mills MD

## 2024-02-22 NOTE — PATIENT INSTRUCTIONS
Try to stay active    We will send you lab results    Advise scheduling with neurology     Be seen promptly if symptoms acutely worsen

## 2024-02-23 ENCOUNTER — TELEPHONE (OUTPATIENT)
Dept: FAMILY MEDICINE | Facility: CLINIC | Age: 64
End: 2024-02-23
Payer: COMMERCIAL

## 2024-02-23 DIAGNOSIS — E21.3 HYPERPARATHYROIDISM (H): Primary | ICD-10-CM

## 2024-02-23 NOTE — RESULT ENCOUNTER NOTE
The inflammation tests ( sedimentation rate and c reactive protein ) are normal.    You do have elevated parathyroid hormone levels.  This was ordered due to the high calcium level.    It would be appropriate to see endocrinology for this.  I am not sure if you have seen an endocrinologist before.  If you need a referral, let us know.    See neurology also as we discussed.    Other labs okay.    Jacques Mills MD

## 2024-02-23 NOTE — TELEPHONE ENCOUNTER
Reason for Call:  Other call back    Detailed comments: PT IS CALLING TODAY AND WOULD LIKE TO GET A REFERRAL TO ENDOCRINOLOGY THAT DR CHAND HAD SUGGESTED ON 2/22/24. PLEASE CALL AND ADVISE PT     Phone Number Patient can be reached at: Cell number on file:    Telephone Information:   Mobile [phone]       Best Time: BEST TIME TO CALL HER IS AFTER 2:00PM    Can we leave a detailed message on this number? Not Applicable    Call taken on 2/23/2024 at 9:13 AM by Bony Fernando

## 2024-02-23 NOTE — TELEPHONE ENCOUNTER
Routing to Provider..      Patient requesting a referral to endocrinology that was discussed at the appointment on 2/22/24.    Willing to send referral?    Christine M Klisch, RN

## 2024-02-27 ENCOUNTER — TELEPHONE (OUTPATIENT)
Dept: ENDOCRINOLOGY | Facility: CLINIC | Age: 64
End: 2024-02-27
Payer: COMMERCIAL

## 2024-02-27 ENCOUNTER — APPOINTMENT (OUTPATIENT)
Dept: URBAN - METROPOLITAN AREA CLINIC 252 | Age: 64
Setting detail: DERMATOLOGY
End: 2024-02-28

## 2024-02-27 DIAGNOSIS — L72.8 OTHER FOLLICULAR CYSTS OF THE SKIN AND SUBCUTANEOUS TISSUE: ICD-10-CM

## 2024-02-27 DIAGNOSIS — L81.4 OTHER MELANIN HYPERPIGMENTATION: ICD-10-CM

## 2024-02-27 DIAGNOSIS — L82.1 OTHER SEBORRHEIC KERATOSIS: ICD-10-CM

## 2024-02-27 PROCEDURE — OTHER BENIGN DESTRUCTION COSMETIC: OTHER

## 2024-02-27 PROCEDURE — OTHER COUNSELING: OTHER

## 2024-02-27 PROCEDURE — OTHER LIQUID NITROGEN (COSMETIC): OTHER

## 2024-02-27 ASSESSMENT — LOCATION SIMPLE DESCRIPTION DERM
LOCATION SIMPLE: UPPER BACK
LOCATION SIMPLE: LEFT EYEBROW
LOCATION SIMPLE: LEFT CHEEK
LOCATION SIMPLE: LEFT FOREHEAD
LOCATION SIMPLE: LEFT SCALP
LOCATION SIMPLE: RIGHT CHEEK
LOCATION SIMPLE: RIGHT FOREHEAD
LOCATION SIMPLE: RIGHT LOWER BACK

## 2024-02-27 ASSESSMENT — LOCATION DETAILED DESCRIPTION DERM
LOCATION DETAILED: LEFT CENTRAL EYEBROW
LOCATION DETAILED: RIGHT CENTRAL MALAR CHEEK
LOCATION DETAILED: LEFT CENTRAL FRONTAL SCALP
LOCATION DETAILED: LEFT SUPERIOR FOREHEAD
LOCATION DETAILED: RIGHT MEDIAL FOREHEAD
LOCATION DETAILED: LEFT MEDIAL FOREHEAD
LOCATION DETAILED: RIGHT SUPERIOR MEDIAL MIDBACK
LOCATION DETAILED: LEFT CENTRAL MALAR CHEEK
LOCATION DETAILED: LEFT MEDIAL FRONTAL SCALP
LOCATION DETAILED: LEFT SUPERIOR CENTRAL MALAR CHEEK
LOCATION DETAILED: LEFT SUPERIOR MEDIAL FOREHEAD
LOCATION DETAILED: INFERIOR THORACIC SPINE

## 2024-02-27 ASSESSMENT — LOCATION ZONE DERM
LOCATION ZONE: SCALP
LOCATION ZONE: TRUNK
LOCATION ZONE: FACE

## 2024-02-27 NOTE — TELEPHONE ENCOUNTER
Spoke with pt to schedule sooner appt   CCs notified to schedule New AMAN spot with Al[facility] or Mini within 1-2 months  Crystal Cheung RN on 2/26/24 at 9:30 AM    Danelle Michael on 2/27/2024 at 11:05 AM

## 2024-02-27 NOTE — PROCEDURE: LIQUID NITROGEN (COSMETIC)
Billing Information: Bill by Static Price
Spray Paint Technique: No
Post-Care Instructions: I reviewed with the patient in detail post-care instructions. Patient is to wear sunprotection, and avoid picking at any of the treated lesions. Pt may apply Vaseline to crusted or scabbing areas.
Detail Level: Detailed
Spray Paint Text: The liquid nitrogen was applied to the skin utilizing a spray paint frosting technique.
Consent: The patient's consent was obtained including but not limited to risks of crusting, scabbing, blistering, scarring, darker or lighter pigmentary change, recurrence, incomplete removal and infection. The patient understands that the procedure is cosmetic in nature and is not covered by insurance.
Show Spray Paint Technique Variable?: Yes

## 2024-02-27 NOTE — HPI: SKIN LESION
What Type Of Note Output Would You Prefer (Optional)?: Bullet Format
Has Your Skin Lesion Been Treated?: not been treated
Is This A New Presentation, Or A Follow-Up?: Growths
Additional History: Diagnosed as benign at a prior OV, here today for elective treatment

## 2024-02-27 NOTE — TELEPHONE ENCOUNTER
RECORDS RECEIVED FROM: Hyperparathyroidism   DATE RECEIVED: 4/16/2024   NOTES (FOR ALL VISITS) STATUS DETAILS   OFFICE NOTES from referring provider [facility]:  2/22/24 - PCC OV with Dr. Mills   OFFICE NOTES from other specialist [facility]:  7/25/19 - ENDO OV with Dr. Viramontes   ED NOTES N/A    OPERATIVE REPORT  (thyroid, pituitary, adrenal, parathyroid) N/A    MEDICATION LIST Internal    IMAGING      DEXASCAN [facility]:  8/18/23 - DEXA   XR (Chest) [facility]:  12/18/21 - XR Chest   LABS     DIABETES: HBGA1C, CREATININE, FASTING LIPIDS, MICROALBUMIN URINE, POTASSIUM, TSH, T4    THYROID: TSH, T4, CBC, THYRODLONULIN, TOTAL T3, FREE T4, CALCITONIN, CEA Internal   [facility]:  2/22/24 - CMP  2/22/24 - HBGA1C  2/22/24 - Parathyroid Hormone  2/22/24 - TSH, T4  7/31/23 - BMP  7/31/23 - CBC  7/31/23 - Lipid  7/31/23 - Phosphorus  4/22/22 - Urine Analysis

## 2024-02-27 NOTE — PROCEDURE: BENIGN DESTRUCTION COSMETIC
Post-Care Instructions: I reviewed with the patient in detail post-care instructions. Patient is to wear sunprotection, and avoid picking at any of the treated lesions. Pt may apply Vaseline to crusted or scabbing areas.
Price (Use Numbers Only, No Special Characters Or $): 150
Anesthesia Volume In Cc: 0
Detail Level: Detailed
Consent: The patient's consent was obtained including but not limited to risks of crusting, scabbing, blistering, scarring, darker or lighter pigmentary change, recurrence, incomplete removal and infection.

## 2024-02-27 NOTE — PROCEDURE: COUNSELING
Detail Level: Simple
Patient Specific Counseling (Will Not Stick From Patient To Patient): Pt squeezed cyst a few days ago. Discussed that cysts can be punch removed if they continue to be a problem
Detail Level: Detailed
Patient Specific Counseling (Will Not Stick From Patient To Patient): ***lesions under the left eye were anesthetized with lidocaine and Epi prior to procedure as patient felt too much discomfort with the topical BLT alone.

## 2024-04-16 ENCOUNTER — PRE VISIT (OUTPATIENT)
Dept: ENDOCRINOLOGY | Facility: CLINIC | Age: 64
End: 2024-04-16

## 2024-04-24 ENCOUNTER — TELEPHONE (OUTPATIENT)
Dept: ENDOCRINOLOGY | Facility: CLINIC | Age: 64
End: 2024-04-24
Payer: COMMERCIAL

## 2024-04-24 NOTE — TELEPHONE ENCOUNTER
Please contact to schedule sooner  due to Calcium 11.3 New AMAN spot with Dr Wang or Dr Morse within 1-2 months  Hyperparathyroid. Yue Khan RN on 4/24/2024 at 2:45 PM

## 2024-04-24 NOTE — TELEPHONE ENCOUNTER
Pt had to cancel her appt on 4/16 due to an insurance issue, but pt got that figured out. Writer scheduled the pt for first available on 11/7 but pt was referred for Hyperparathyroidism, wanting to send a message to make sure pt does not need to be seen sooner

## 2024-04-24 NOTE — TELEPHONE ENCOUNTER
Left Voicemail (1st Attempt) for the patient to call back and schedule the following:    Appointment type: New Endocrine   Provider: Felipe or Mini   Return date: Sooner than appt on 11/7  Specialty phone number: [phone]   Additional appointment(s) needed: NA   Additonal Notes: LVM, MyC x1     Please contact to schedule sooner  due to Calcium 11.3 New AMAN spot with Dr Wang or Dr Morse within 1-2 months  Hyperparathyroid. Yue Khan RN on 4/24/2024 at 2:45 PM         Examples:  7/30 Mini in person [facility]  8/1 [place] virtual [facility]  8/6 [place] virtual [facility]  8/6 Mini virtual [facility]  8/7 Mini in person [facility]  8/8 [place] virtual [facility]    * Check to see if there are sooner visits since pts cancel often. If there are no sooner visits and the options above are scheduled or do not work for pt please schedule next avail New AMAN or 2 back-to-back return AMAN slots at [facility] or  only. Thank you. *    Please note that the above appointment(s) will require manual scheduling as they are marked as AMAN and will not appear using auto search. Do not schedule the patient if another patient has already been scheduled in the requested appointment slot.     Oxana Hayes on 4/24/2024 at 3:09 PM

## 2024-05-30 NOTE — CONFIDENTIAL NOTE
RECORDS RECEIVED FROM: internal    DATE RECEIVED: 6.6.24    NOTES (FOR ALL VISITS) STATUS DETAILS   OFFICE NOTES from referring provider internal    Jacques Mills MD      MEDICATION LIST internal     IMAGING      DEXASCAN internal  9.1.23, 8/18/23   LABS     DIABETES: HBGA1C, CREATININE, FASTING LIPIDS, MICROALBUMIN URINE, POTASSIUM, TSH, T4    THYROID: TSH, T4, CBC, THYRODLONULIN, TOTAL T3, FREE T4, CALCITONIN, CEA internal  Paratyhroid horm- 2.22.24  HBGA1C- 2.22.24  TSH/T4- 2.22.24  Cbc- 2.22.24  Cmp- 2.22.24  Vitamin D- 7.31.23  Bmp- 7.31.23  Lipid- 7.31.23

## 2024-06-06 ENCOUNTER — PRE VISIT (OUTPATIENT)
Dept: ENDOCRINOLOGY | Facility: CLINIC | Age: 64
End: 2024-06-06

## 2024-06-06 ENCOUNTER — VIRTUAL VISIT (OUTPATIENT)
Dept: ENDOCRINOLOGY | Facility: CLINIC | Age: 64
End: 2024-06-06
Attending: FAMILY MEDICINE
Payer: COMMERCIAL

## 2024-06-06 VITALS — BODY MASS INDEX: 27.2 KG/M2 | WEIGHT: 190 LBS | HEIGHT: 70 IN

## 2024-06-06 DIAGNOSIS — E21.3 HYPERPARATHYROIDISM (H): ICD-10-CM

## 2024-06-06 PROCEDURE — G2211 COMPLEX E/M VISIT ADD ON: HCPCS | Mod: 95 | Performed by: STUDENT IN AN ORGANIZED HEALTH CARE EDUCATION/TRAINING PROGRAM

## 2024-06-06 PROCEDURE — 99204 OFFICE O/P NEW MOD 45 MIN: CPT | Mod: 95 | Performed by: STUDENT IN AN ORGANIZED HEALTH CARE EDUCATION/TRAINING PROGRAM

## 2024-06-06 ASSESSMENT — PAIN SCALES - GENERAL: PAINLEVEL: NO PAIN (0)

## 2024-06-06 NOTE — NURSING NOTE
Is the patient currently in the state of [place]? YES    Visit mode:VIDEO    If the visit is dropped, the patient can be reconnected by: VIDEO VISIT: Text to cell phone:   Telephone Information:   Mobile [phone]       Will anyone else be joining the visit? NO  (If patient encounters technical issues they should call [phone]    How would you like to obtain your AVS? MyChart    Are changes needed to the allergy or medication list? No    Are refills needed on medications prescribed by this physician? YES    Reason for visit: RECHECK    Bobbilynn Grossaint VVF

## 2024-06-06 NOTE — PROGRESS NOTES
Endocrinology Clinic Visit 6/6/2024      Video-Visit Details    Type of service:  Video Visit    Joined the call at 6/6/2024, 11:36:09 am.  Left the call at 6/6/2024, 12:07:06 pm.    Originating Location (pt. Location): Home        Distant Location (provider location):  Off-site    Mode of Communication:  Video Conference via [employer]    Physician has received verbal consent for a Video Visit from the patient? Yes    I spent a total of 45 minutes on the date of encounter reviewing medical records, evaluating the patient, coordinating care and documenting in the EHR, as detailed above.      NAME:  Mary Irizarry  PCP:  Carol Jaeger  MRN:  [id number]  Reason for Consult:  hypercalcemia  Requesting Provider:  Referred Self    Chief Complaint     Chief Complaint   Patient presents with    RECHECK       History of Present Illness     Mary Irizarry is a 64 year old female who is seen in video visit for hypercalcemia    Her oldest lab was 5/2019, ca was elevated at 10.7 and remained elevated since then. No prior labs , she was not seeing doctors in the past. Most recent labs:     Latest Ref Rng 2/22/2024  8:17 AM   ENDO CALCIUM LABS-[facility]     Albumin 3.5 - 5.2 g/dL 4.7    Alkaline Phosphatase 40 - 150 U/L 47    Calcium 8.8 - 10.2 mg/dL 11.3 (H)    Creatinine 0.51 - 0.95 mg/dL 0.73    Parathyroid Hormone Intact 15 - 65 pg/mL 82 (H)       Dxa scan 8/2023:  FINDINGS:               Lumbar Spine (L1-L4)      T-score: -2.0, marked degenerative changes present                Left Femoral Neck            T-score:  -1.8               Right Femoral Neck          T-score:  -1.9               Forearm (radius 33%)      T-score:  -2.9                  Lumbar (L1-L4) BMD: 0.945                             Total Hip Mean BMD: 0.782                 Forearm (radius 33%) BMD: 0.508    Symptoms of hypercalcemia: chronic constipation, no dyspepsia, no mental status changes/lethargy but tired, no polyuria.    Calcium  intake: Dietary: no milk, cheese every day , yogurt sometimes, a lot of greens. Supplements: yes unsure of dose prescribed by chiropractor     Vitamin D intake: Supplements: yes, unsure of dose    Previous fractures: No  Family history of fragility fracture in parent: N/A she does not know her FH  History of kidney stones: No  History of kidney disease: No  Family history of any calcium problems or kidney stones: N/A  History of vitamin D deficiency: No  History of HCTZ use: No  History of Lithium use: No  History of malabsorption (IBD, Celiac, gastric bypass ): No  History of thyroid disease: No  Menopause at age : late 40s  Non smoker, limited alcohol      Social: she is an assisted .    Problem List     Patient Active Problem List   Diagnosis    Raynaud's syndrome    Herpes simplex virus infection    RBBB (right bundle branch block with left anterior fascicular block)    Atrial enlargement, left        Medications     Current Outpatient Medications   Medication Sig Dispense Refill    acyclovir (ZOVIRAX) 400 MG tablet TAKE 1 TABLET BY MOUTH EVERY DAY MAY TAKE 1 TAB TWICE DAILY FOR 5 DAYS FOR BREAK OUTS. 120 tablet 3    cetirizine (ZYRTEC) 10 MG tablet Take 1 tablet (10 mg) by mouth daily Needs to be seen for further refills 90 tablet 3    terbinafine (LAMISIL) 1 % external cream Apply topically 2 times daily 42 g 4    tolnaftate (LAMISIL) 1 % external spray Externally apply topically 2 times daily To feet as needed 138 g 4    zolpidem (AMBIEN) 5 MG tablet Take 1 tablet (5 mg) by mouth nightly as needed for sleep 30 tablet 1     Current Facility-Administered Medications   Medication Dose Route Frequency Provider Last Rate Last Admin    BUPivacaine (MARCAINE) 0.25 % injection 4 mL  4 mL   Freedom Chapman PA   4 mL at 09/08/23 1144    triamcinolone (KENALOG-40) injection 80 mg  80 mg   Freedom Chapman PA   80 mg at 09/08/23 1144        Allergies     Allergies   Allergen Reactions    Levaquin  [Levofloxacin] Other (See Comments)     tendonitis    Penicillins Rash       Medical / Surgical History     No past medical history on file.  Past Surgical History:   Procedure Laterality Date    COLONOSCOPY WITH CO2 INSUFFLATION N/A 12/7/2023    Procedure: Colonoscopy with CO2 insufflation;  Surgeon: Keara Belle DO;  Location: [facility] OR    ENT SURGERY      mouth    LASIK      monovision - left is near eye    SOFT TISSUE SURGERY      remove needle from foot       Social History     Social History     Socioeconomic History    Marital status: Single     Spouse name: Not on file    Number of children: Not on file    Years of education: Not on file    Highest education level: Not on file   Occupational History    Not on file   Tobacco Use    Smoking status: Former     Passive exposure: Never    Smokeless tobacco: Never   Vaping Use    Vaping status: Never Used   Substance and Sexual Activity    Alcohol use: Yes    Drug use: No    Sexual activity: Never   Other Topics Concern    Parent/sibling w/ CABG, MI or angioplasty before 65F 55M? Not Asked   Social History Narrative    Not on file     Social Determinants of Health     Financial Resource Strain: High Risk (12/23/2021)    [facility]    Financial Resource Strain     Difficulty of Paying Living Expenses: Not on file     Difficulty of Paying Living Expenses: Not on file   Food Insecurity: Not on file   Transportation Needs: Not on file   Physical Activity: Not on file   Stress: Not on file   Social Connections: Unknown (12/23/2021)    Received from [facility]    Social Connections     Frequency of Communication with Friends and Family: Not on file   Interpersonal Safety: Low Risk  (2/22/2024)    Interpersonal Safety     Do you feel physically and emotionally safe where you currently live?: Yes      "Within the past 12 months, have you been hit, slapped, kicked or otherwise physically hurt by someone?: No     Within the past 12 months, have you been humiliated or emotionally abused in other ways by your partner or ex-partner?: No   Housing Stability: Not on file       Family History     Family History   Problem Relation Age of Onset    Diabetes Father     Glaucoma No family hx of     Macular Degeneration No family hx of        ROS     12 ROS completed, pertinent positive and negative in HPI  Numbness in the feet bilaterally.     Physical Exam   Ht 1.778 m (5' 10\")   Wt 86.2 kg (190 lb)   BMI 27.26 kg/m     GENERAL: alert and no distress  EYES: Eyes grossly normal to inspection.  No discharge or erythema, or obvious scleral/conjunctival abnormalities.  RESP: No audible wheeze, cough, or visible cyanosis.    SKIN: Visible skin clear. No significant rash, abnormal pigmentation or lesions.  NEURO: Cranial nerves grossly intact.  Mentation and speech appropriate for age.  PSYCH: Appropriate affect, tone, and pace of words     Labs/Imaging     Pertinent Labs were reviewed and updated in EPIC and discussed  .  Radiology Results were  reviewed and updated in EPIC and discussed briefly.    Summary of recent findings:   Lab Results   Component Value Date    A1C 5.6 02/22/2024       TSH   Date Value Ref Range Status   02/22/2024 1.36 0.30 - 4.20 uIU/mL Final   09/10/2018 1.03 0.40 - 4.00 mU/L Final     T4 Free   Date Value Ref Range Status   09/10/2018 0.84 0.76 - 1.46 ng/dL Final       Creatinine   Date Value Ref Range Status   02/22/2024 0.73 0.51 - 0.95 mg/dL Final   03/11/2020 0.92 0.52 - 1.04 mg/dL Final       Recent Labs   Lab Test 07/31/23  1501 03/11/20  1904   CHOL 220* 236*   HDL 77 95   * 125*   TRIG 54 79       No results found for: \"[id number]\", \"[id number]\", \"[id number]\"    I personally reviewed the patient's outside records from [facility] EMR and Care Everywhere. Summary of pertinent findings in " HPI.    Impression / Plan     1. Hypercalcemia  2. hyperparathyroidism  2. Osteoporosis   With elevated PTH, the most likely diagnosis is primary hyperparathyroidism. Two other less likely etiologies include parathyroid hyperplasia from MEN syndrome, and Familial Hypocalciuric Hypercalemia. There is no family history to suggest any of the latter two diagnoses. To differentiate primary HPTH from FHH, a 24 hr urine collection is to be done (low calcium excretion in FHH, vs. Normal to high in primary HPTH).     The latest guidelines conclude that surgery for hyperparathyroidism is indicated in symptomatic patients, or in asymptomatic patients who meet any one of the following conditions:  Serum calcium concentration of 1.0 mg/dL (0.25 mmol/L) or more above the upper limit of normal   Creatnine clearance reduced to < 60 mL/min, or 24-hour urine for calcium > 400 mg/day and increased stone risk by biochemical stone risk analysis, or nephrolithiasis or nephrocalcinosis on imaging studyCreatinine clearance that is reduced to <60 mL/min   Bone density at the hip, lumbar spine, or distal radius that is more than 2.5 standard deviations below peak bone mass (T score <-2.5) and/or previous fragility fracture   Age less than 50 years  Operative intervention can be delayed in patients over 50 years of age who are asymptomatic or minimally symptomatic and who have serum calcium concentrations <1.0 mg/dL (0.2 mmol/L) above the upper limit of normal, and in patients who are medically unfit for surgery.    As far as calcium intake, even in cases of hyperparathyroidism, it is recommended to keep up calcium intake to about 1000 mg daily, to prevent further increase in PTH and bone disease. I advised that to the patient.    I have discussed with the patient the procedure of cervical exploration, parathyroidectomy. Reviewed the risks, benefits, potential complications, length of hospitalization, disability, including bleeding,  infection, injury to blood vessel, nerve structures of the neck including the recurrent laryngeal nerves as well as permanent hypocalcemia. After a full discussion, she understands and would like to proceed.      I will get 24 hour urine collection of ca first, once PHPTH is confirmed, I will place orders for parathyroid imaging and ENT referral.        Test and/or medications prescribed today:  Orders Placed This Encounter   Procedures    Calcium timed urine    Creatinine timed urine         Follow up: 4 month    The longitudinal plan of care for the diagnosis(es)/condition(s) as documented were addressed during this visit. Due to the added complexity in care, I will continue to support Mary in the subsequent management and with ongoing continuity of care.    Nura Wang MD  Endocrinology, Diabetes and Metabolism  [facility]

## 2024-06-06 NOTE — PATIENT INSTRUCTIONS
24 Hour Urine Collection instruction    Decide a day you want to collect the urine for 24 hours.   On the day of collection, discard the first void urine in the morning.   Start collecting all the urine in the provided container for the entire day and over night.   The next morning when you wake up collect the first void urine in the container and then submit the container to the lab.  Store in the refrigerator    --------------------------------------------------------------------------

## 2024-06-06 NOTE — LETTER
6/6/2024       RE: Mary Irizarry  [address] [facility] [address]     Dear Colleague,    Thank you for referring your patient, Mary Irizarry, to the [facility] [place] at [facility]. Please see a copy of my visit note below.    Endocrinology Clinic Visit 6/6/2024      Video-Visit Details    Type of service:  Video Visit    Joined the call at 6/6/2024, 11:36:09 am.  Left the call at 6/6/2024, 12:07:06 pm.    Originating Location (pt. Location): Home        Distant Location (provider location):  Off-site    Mode of Communication:  Video Conference via [employer]    Physician has received verbal consent for a Video Visit from the patient? Yes    I spent a total of 45 minutes on the date of encounter reviewing medical records, evaluating the patient, coordinating care and documenting in the EHR, as detailed above.      NAME:  Mary Irizarry  PCP:  Carol Jaeger  MRN:  [id number]  Reason for Consult:  hypercalcemia  Requesting Provider:  Referred Self    Chief Complaint     Chief Complaint   Patient presents with    RECHECK       History of Present Illness     Mary Irizarry is a 64 year old female who is seen in video visit for hypercalcemia    Her oldest lab was 5/2019, ca was elevated at 10.7 and remained elevated since then. No prior labs , she was not seeing doctors in the past. Most recent labs:     Latest Ref Rng 2/22/2024  8:17 AM   ENDO CALCIUM LABS-[facility]     Albumin 3.5 - 5.2 g/dL 4.7    Alkaline Phosphatase 40 - 150 U/L 47    Calcium 8.8 - 10.2 mg/dL 11.3 (H)    Creatinine 0.51 - 0.95 mg/dL 0.73    Parathyroid Hormone Intact 15 - 65 pg/mL 82 (H)       Dxa scan 8/2023:  FINDINGS:               Lumbar Spine (L1-L4)      T-score: -2.0, marked degenerative changes present                Left Femoral Neck            T-score:  -1.8               Right Femoral Neck          T-score:  -1.9                Forearm (radius 33%)      T-score:  -2.9                  Lumbar (L1-L4) BMD: 0.945                             Total Hip Mean BMD: 0.782                 Forearm (radius 33%) BMD: 0.508    Symptoms of hypercalcemia: chronic constipation, no dyspepsia, no mental status changes/lethargy but tired, no polyuria.    Calcium intake: Dietary: no milk, cheese every day , yogurt sometimes, a lot of greens. Supplements: yes unsure of dose prescribed by chiropractor     Vitamin D intake: Supplements: yes, unsure of dose    Previous fractures: No  Family history of fragility fracture in parent: N/A she does not know her FH  History of kidney stones: No  History of kidney disease: No  Family history of any calcium problems or kidney stones: N/A  History of vitamin D deficiency: No  History of HCTZ use: No  History of Lithium use: No  History of malabsorption (IBD, Celiac, gastric bypass ): No  History of thyroid disease: No  Menopause at age : late 40s  Non smoker, limited alcohol      Social: she is an assisted .    Problem List     Patient Active Problem List   Diagnosis    Raynaud's syndrome    Herpes simplex virus infection    RBBB (right bundle branch block with left anterior fascicular block)    Atrial enlargement, left        Medications     Current Outpatient Medications   Medication Sig Dispense Refill    acyclovir (ZOVIRAX) 400 MG tablet TAKE 1 TABLET BY MOUTH EVERY DAY MAY TAKE 1 TAB TWICE DAILY FOR 5 DAYS FOR BREAK OUTS. 120 tablet 3    cetirizine (ZYRTEC) 10 MG tablet Take 1 tablet (10 mg) by mouth daily Needs to be seen for further refills 90 tablet 3    terbinafine (LAMISIL) 1 % external cream Apply topically 2 times daily 42 g 4    tolnaftate (LAMISIL) 1 % external spray Externally apply topically 2 times daily To feet as needed 138 g 4    zolpidem (AMBIEN) 5 MG tablet Take 1 tablet (5 mg) by mouth nightly as needed for sleep 30 tablet 1     Current Facility-Administered Medications   Medication  Dose Route Frequency Provider Last Rate Last Admin    BUPivacaine (MARCAINE) 0.25 % injection 4 mL  4 mL   Freedom Chapman PA   4 mL at 09/08/23 1144    triamcinolone (KENALOG-40) injection 80 mg  80 mg   Freedom Chapman PA   80 mg at 09/08/23 1144        Allergies     Allergies   Allergen Reactions    Levaquin [Levofloxacin] Other (See Comments)     tendonitis    Penicillins Rash       Medical / Surgical History     No past medical history on file.  Past Surgical History:   Procedure Laterality Date    COLONOSCOPY WITH CO2 INSUFFLATION N/A 12/7/2023    Procedure: Colonoscopy with CO2 insufflation;  Surgeon: Keara Belle DO;  Location: [facility] OR    ENT SURGERY      mouth    LASIK      monovision - left is near eye    SOFT TISSUE SURGERY      remove needle from foot       Social History     Social History     Socioeconomic History    Marital status: Single     Spouse name: Not on file    Number of children: Not on file    Years of education: Not on file    Highest education level: Not on file   Occupational History    Not on file   Tobacco Use    Smoking status: Former     Passive exposure: Never    Smokeless tobacco: Never   Vaping Use    Vaping status: Never Used   Substance and Sexual Activity    Alcohol use: Yes    Drug use: No    Sexual activity: Never   Other Topics Concern    Parent/sibling w/ CABG, MI or angioplasty before 65F 55M? Not Asked   Social History Narrative    Not on file     Social Determinants of Health     Financial Resource Strain: High Risk (12/23/2021)    Received from [facility], [facility]    Financial Resource Strain     Difficulty of Paying Living Expenses: Not on file     Difficulty of Paying Living Expenses: Not on file   Food Insecurity: Not on file   Transportation Needs: Not on file   Physical Activity: Not on file   Stress: Not on file   Social Connections: Unknown (12/23/2021)    Received from [facility], [facility]    Social Connections     Frequency of Communication with Friends and Family: Not on file   Interpersonal Safety: Low Risk  (2/22/2024)    Interpersonal Safety     Do you feel physically and emotionally safe where you currently live?: Yes     Within the past 12 months, have you been hit, slapped, kicked or otherwise physically hurt by someone?: No     Within the past 12 months, have you been humiliated or emotionally abused in other ways by your partner or ex-partner?: No   Housing Stability: Not on file       Family History     Family History   Problem Relation Age of Onset    Diabetes Father     Glaucoma No family hx of     Macular Degeneration No family hx of        ROS     12 ROS completed, pertinent positive and negative in HPI  Numbness in the feet bilaterally.     Physical Exam   Ht 1.778 m (5' 10\")   Wt 86.2 kg (190 lb)   BMI 27.26 kg/m     GENERAL: alert and no distress  EYES: Eyes grossly normal to inspection.  No discharge or erythema, or obvious scleral/conjunctival abnormalities.  RESP: No audible wheeze, cough, or visible cyanosis.    SKIN: Visible skin clear. No significant rash, abnormal pigmentation or lesions.  NEURO: Cranial nerves grossly intact.  Mentation and speech appropriate for age.  PSYCH: Appropriate affect, tone, and pace of words     Labs/Imaging     Pertinent Labs were reviewed and updated in EPIC and discussed  .  Radiology Results were  reviewed and updated in EPIC and discussed briefly.    Summary of recent findings:   Lab Results   Component Value Date    A1C 5.6 02/22/2024       TSH   Date Value Ref Range Status   02/22/2024 1.36 0.30 - 4.20 uIU/mL Final   09/10/2018 1.03 0.40 - 4.00 mU/L Final     T4 Free   Date Value Ref Range Status   09/10/2018 0.84 0.76 - 1.46 ng/dL Final       Creatinine   Date Value Ref Range Status   02/22/2024 0.73 0.51 - 0.95 mg/dL Final   03/11/2020 0.92 0.52 - 1.04 " "mg/dL Final       Recent Labs   Lab Test 07/31/23  1501 03/11/20  1904   CHOL 220* 236*   HDL 77 95   * 125*   TRIG 54 79       No results found for: \"[id number]\", \"[id number]\", \"[id number]\"    I personally reviewed the patient's outside records from [facility] Everywhere. Summary of pertinent findings in HPI.    Impression / Plan     1. Hypercalcemia  2. hyperparathyroidism  2. Osteoporosis   With elevated PTH, the most likely diagnosis is primary hyperparathyroidism. Two other less likely etiologies include parathyroid hyperplasia from MEN syndrome, and Familial Hypocalciuric Hypercalemia. There is no family history to suggest any of the latter two diagnoses. To differentiate primary HPTH from FHH, a 24 hr urine collection is to be done (low calcium excretion in FHH, vs. Normal to high in primary HPTH).     The latest guidelines conclude that surgery for hyperparathyroidism is indicated in symptomatic patients, or in asymptomatic patients who meet any one of the following conditions:  Serum calcium concentration of 1.0 mg/dL (0.25 mmol/L) or more above the upper limit of normal   Creatnine clearance reduced to < 60 mL/min, or 24-hour urine for calcium > 400 mg/day and increased stone risk by biochemical stone risk analysis, or nephrolithiasis or nephrocalcinosis on imaging studyCreatinine clearance that is reduced to <60 mL/min   Bone density at the hip, lumbar spine, or distal radius that is more than 2.5 standard deviations below peak bone mass (T score <-2.5) and/or previous fragility fracture   Age less than 50 years  Operative intervention can be delayed in patients over 50 years of age who are asymptomatic or minimally symptomatic and who have serum calcium concentrations <1.0 mg/dL (0.2 mmol/L) above the upper limit of normal, and in patients who are medically unfit for surgery.    As far as calcium intake, even in cases of hyperparathyroidism, it is recommended to keep up calcium intake to " about 1000 mg daily, to prevent further increase in PTH and bone disease. I advised that to the patient.    I have discussed with the patient the procedure of cervical exploration, parathyroidectomy. Reviewed the risks, benefits, potential complications, length of hospitalization, disability, including bleeding, infection, injury to blood vessel, nerve structures of the neck including the recurrent laryngeal nerves as well as permanent hypocalcemia. After a full discussion, she understands and would like to proceed.      I will get 24 hour urine collection of ca first, once PHPTH is confirmed, I will place orders for parathyroid imaging and ENT referral.        Test and/or medications prescribed today:  Orders Placed This Encounter   Procedures    Calcium timed urine    Creatinine timed urine         Follow up: 4 month    The longitudinal plan of care for the diagnosis(es)/condition(s) as documented were addressed during this visit. Due to the added complexity in care, I will continue to support Mary in the subsequent management and with ongoing continuity of care.    Nura Wang MD  Endocrinology, Diabetes and Metabolism  [facility]

## 2024-07-01 ENCOUNTER — PATIENT OUTREACH (OUTPATIENT)
Dept: CARE COORDINATION | Facility: CLINIC | Age: 64
End: 2024-07-01
Payer: COMMERCIAL

## 2024-07-15 ENCOUNTER — PATIENT OUTREACH (OUTPATIENT)
Dept: CARE COORDINATION | Facility: CLINIC | Age: 64
End: 2024-07-15
Payer: COMMERCIAL

## 2024-10-20 ENCOUNTER — HEALTH MAINTENANCE LETTER (OUTPATIENT)
Age: 64
End: 2024-10-20

## 2024-11-13 ENCOUNTER — OFFICE VISIT (OUTPATIENT)
Dept: FAMILY MEDICINE | Facility: CLINIC | Age: 64
End: 2024-11-13
Payer: MEDICAID

## 2024-11-13 VITALS
HEART RATE: 64 BPM | SYSTOLIC BLOOD PRESSURE: 132 MMHG | DIASTOLIC BLOOD PRESSURE: 86 MMHG | RESPIRATION RATE: 18 BRPM | TEMPERATURE: 98.1 F | BODY MASS INDEX: 28.53 KG/M2 | HEIGHT: 70 IN | WEIGHT: 199.25 LBS | OXYGEN SATURATION: 99 %

## 2024-11-13 DIAGNOSIS — B00.9 HERPES SIMPLEX VIRUS INFECTION: ICD-10-CM

## 2024-11-13 DIAGNOSIS — M25.572 LEFT ANKLE PAIN, UNSPECIFIED CHRONICITY: ICD-10-CM

## 2024-11-13 DIAGNOSIS — E21.3 HYPERPARATHYROIDISM (H): Primary | ICD-10-CM

## 2024-11-13 DIAGNOSIS — J30.2 SEASONAL ALLERGIES: ICD-10-CM

## 2024-11-13 DIAGNOSIS — G47.00 INSOMNIA, UNSPECIFIED TYPE: ICD-10-CM

## 2024-11-13 PROCEDURE — 99213 OFFICE O/P EST LOW 20 MIN: CPT | Performed by: FAMILY MEDICINE

## 2024-11-13 PROCEDURE — 83970 ASSAY OF PARATHORMONE: CPT | Performed by: FAMILY MEDICINE

## 2024-11-13 PROCEDURE — 80053 COMPREHEN METABOLIC PANEL: CPT | Performed by: FAMILY MEDICINE

## 2024-11-13 PROCEDURE — 36415 COLL VENOUS BLD VENIPUNCTURE: CPT | Performed by: FAMILY MEDICINE

## 2024-11-13 RX ORDER — CETIRIZINE HYDROCHLORIDE 10 MG/1
10 TABLET ORAL DAILY
Qty: 90 TABLET | Refills: 3 | Status: SHIPPED | OUTPATIENT
Start: 2024-11-13

## 2024-11-13 RX ORDER — ACYCLOVIR 400 MG/1
TABLET ORAL
Qty: 120 TABLET | Refills: 3 | Status: SHIPPED | OUTPATIENT
Start: 2024-11-13

## 2024-11-13 RX ORDER — ZOLPIDEM TARTRATE 5 MG/1
5 TABLET ORAL
Qty: 10 TABLET | Refills: 0 | Status: SHIPPED | OUTPATIENT
Start: 2024-11-13

## 2024-11-13 ASSESSMENT — PAIN SCALES - GENERAL: PAINLEVEL_OUTOF10: MILD PAIN (3)

## 2024-11-13 NOTE — PATIENT INSTRUCTIONS
We will send you lab results    Urine order from endocrinology released    See podiatry for ankle

## 2024-11-13 NOTE — PROGRESS NOTES
"      Laura Hernandez is a 64 year old, presenting for the following health issues:  Ankle Pain and Recheck Medication        11/13/2024     3:00 PM   Additional Questions   Roomed by Tayla Nunn     History of Present Illness       Reason for visit:  Urine test, med refill, ankle px    She eats 4 or more servings of fruits and vegetables daily.She consumes 0 sweetened beverage(s) daily.She exercises with enough effort to increase her heart rate 9 or less minutes per day.  She exercises with enough effort to increase her heart rate 3 or less days per week.   She is taking medications regularly.        Feeling  good    Wt up     Lost insurance, got it back    Zolpidem only every couple months             Objective    /86 (BP Location: Left arm, Patient Position: Chair, Cuff Size: Adult Regular)   Pulse 64   Temp 98.1  F (36.7  C) (Temporal)   Resp 18   Ht 1.778 m (5' 10\")   Wt 90.4 kg (199 lb 4 oz)   SpO2 99%   BMI 28.59 kg/m    Body mass index is 28.59 kg/m .  Physical Exam    Full physical not done     Mentation and affect are fine    No tremor of speech or extremity    No particular point tenderness on affected ankle.  Range of motion okay.  Per patient making it hard to walk     ASSESSMENT / PLAN:  (E21.3) Hyperparathyroidism (H)  (primary encounter diagnosis)  Comment: recheck labs.  Also released the orders from endocrinology for the urine tests.  Plan: Parathyroid Hormone Intact, Comprehensive         metabolic panel             (B00.9) Herpes simplex virus infection  Comment: refill this, chronic med for patient   Plan: acyclovir (ZOVIRAX) 400 MG tablet             (J30.2) Seasonal allergies  Comment: refill    Plan: cetirizine (ZYRTEC) 10 MG tablet             (G47.00) Insomnia, unspecified type  Comment:  only uses sparingly   Plan: zolpidem (AMBIEN) 5 MG tablet        Limited prescription given     (M25.572) Left ankle pain, unspecified chronicity  Comment: one year of pain.  "   Plan: Orthopedic  Referral        Patient to schedule with podiatry       I reviewed the patient's medications, allergies, medical history, family history, and social history.    Jacques Mills MD          Signed Electronically by: Jacques Mills MD

## 2024-11-14 ENCOUNTER — PATIENT OUTREACH (OUTPATIENT)
Dept: CARE COORDINATION | Facility: CLINIC | Age: 64
End: 2024-11-14
Payer: MEDICAID

## 2024-11-14 LAB
ALBUMIN SERPL BCG-MCNC: 4.3 G/DL (ref 3.5–5.2)
ALP SERPL-CCNC: 72 U/L (ref 40–150)
ALT SERPL W P-5'-P-CCNC: 32 U/L (ref 0–50)
ANION GAP SERPL CALCULATED.3IONS-SCNC: 9 MMOL/L (ref 7–15)
AST SERPL W P-5'-P-CCNC: 29 U/L (ref 0–45)
BILIRUB SERPL-MCNC: 0.2 MG/DL
BUN SERPL-MCNC: 16.8 MG/DL (ref 8–23)
CALCIUM SERPL-MCNC: 11.2 MG/DL (ref 8.8–10.4)
CHLORIDE SERPL-SCNC: 107 MMOL/L (ref 98–107)
CREAT SERPL-MCNC: 0.84 MG/DL (ref 0.51–0.95)
EGFRCR SERPLBLD CKD-EPI 2021: 78 ML/MIN/1.73M2
GLUCOSE SERPL-MCNC: 92 MG/DL (ref 70–99)
HCO3 SERPL-SCNC: 24 MMOL/L (ref 22–29)
POTASSIUM SERPL-SCNC: 4.5 MMOL/L (ref 3.4–5.3)
PROT SERPL-MCNC: 6.5 G/DL (ref 6.4–8.3)
PTH-INTACT SERPL-MCNC: 89 PG/ML (ref 15–65)
SODIUM SERPL-SCNC: 140 MMOL/L (ref 135–145)

## 2024-11-15 LAB
CALCIUM 24H UR-MRATE: 0.57 G/SPEC (ref 0.1–0.3)
CALCIUM UR-MCNC: 17.9 MG/DL
COLLECT DURATION TIME UR: 24 H
COLLECT DURATION TIME UR: 24 H
CREAT 24H UR-MRATE: 1.54 G/SPEC (ref 0.72–1.51)
CREAT UR-MCNC: 48.5 MG/DL
SPECIMEN VOL UR: 3180 ML
SPECIMEN VOL UR: 3180 ML

## 2024-11-15 NOTE — RESULT ENCOUNTER NOTE
Blood tests confirm you still have hyperparathyroidism.    Follow up with endocrinology.    Jacques Mills MD

## 2024-11-20 ENCOUNTER — TELEPHONE (OUTPATIENT)
Dept: FAMILY MEDICINE | Facility: CLINIC | Age: 64
End: 2024-11-20
Payer: MEDICAID

## 2024-11-20 DIAGNOSIS — G62.9 PERIPHERAL POLYNEUROPATHY: Primary | ICD-10-CM

## 2024-11-20 NOTE — TELEPHONE ENCOUNTER
Order/Referral Request    Who is requesting: Dr. Mills/ Patient     Orders being requested: Neurology    Reason service is needed/diagnosis: Numbness and tingles in legs in feet. Patient states the clinic  referred her too is not accepting new patients therefore she is requesting a new referral be placed and faxed to information listed below.     When are orders needed by:  Dr. Mills     Has this been discussed with Provider: Yes    Does patient have a preference on a Group/Provider/Facility? [facility], Fax# [phone] # [phone]     Does patient have an appointment scheduled?: No    Where to send orders: Fax    Could we send this information to you in [employer] or would you prefer to receive a phone call?:   Patient would prefer a phone call   Okay to leave a detailed message?: Yes at Cell number on file:    Telephone Information:   Mobile [phone]

## 2024-11-20 NOTE — TELEPHONE ENCOUNTER
Previous referral was from 2/22/24 for peripheral neuropathy   Location- [facility]    New referral to \[facility]\"" [facility] cue'd up    Lynn Berkowitz RN  [facility]

## 2024-11-27 ENCOUNTER — ANCILLARY PROCEDURE (OUTPATIENT)
Dept: GENERAL RADIOLOGY | Facility: CLINIC | Age: 64
End: 2024-11-27
Attending: PODIATRIST
Payer: MEDICAID

## 2024-11-27 ENCOUNTER — OFFICE VISIT (OUTPATIENT)
Dept: PODIATRY | Facility: CLINIC | Age: 64
End: 2024-11-27
Attending: FAMILY MEDICINE
Payer: MEDICAID

## 2024-11-27 VITALS
HEIGHT: 70 IN | RESPIRATION RATE: 16 BRPM | DIASTOLIC BLOOD PRESSURE: 81 MMHG | BODY MASS INDEX: 28.49 KG/M2 | OXYGEN SATURATION: 98 % | HEART RATE: 60 BPM | SYSTOLIC BLOOD PRESSURE: 123 MMHG | WEIGHT: 199 LBS

## 2024-11-27 DIAGNOSIS — M25.572 LEFT ANKLE PAIN, UNSPECIFIED CHRONICITY: ICD-10-CM

## 2024-11-27 PROCEDURE — 73610 X-RAY EXAM OF ANKLE: CPT | Mod: LT | Performed by: RADIOLOGY

## 2024-11-27 ASSESSMENT — PAIN SCALES - GENERAL: PAINLEVEL_OUTOF10: MILD PAIN (2)

## 2024-11-27 NOTE — PROGRESS NOTES
Subjective:    Pt is seen today in consult from Dr. Mills with the c/c of painful left foot.  This has been symptomatic for the past 6 months.  Points to anterior central ankle.  States sometimes walking and she feels as though it is catching.  Pt denies any hx of trauma.  Patient was gardening a lot at the time and using a shovel.  She is wondering if this caused this.  Patient states she meditates every day.  She is sitting crosslegged for 20 minutes.  Has had to do this less because of bothers it.  She never had any edema ecchymosis erythema weakness or numbness.  Aggravated by activity and releaved by rest.  Describes as burning pain.  Bothers her daily.  Bothers her when she walks.  Has history of hyperparathyroidism and osteoporosis.  History of numbness and tingling in all toes and will be seeing neurology.    ROS:  see above         Allergies   Allergen Reactions    Levaquin [Levofloxacin] Other (See Comments)     tendonitis    Penicillins Rash       Current Outpatient Medications   Medication Sig Dispense Refill    acyclovir (ZOVIRAX) 400 MG tablet TAKE 1 TABLET BY MOUTH EVERY DAY MAY TAKE 1 TAB TWICE DAILY FOR 5 DAYS FOR BREAK OUTS. 120 tablet 3    cetirizine (ZYRTEC) 10 MG tablet Take 1 tablet (10 mg) by mouth daily. Needs to be seen for further refills 90 tablet 3    zolpidem (AMBIEN) 5 MG tablet Take 1 tablet (5 mg) by mouth nightly as needed for sleep. 10 tablet 0       Patient Active Problem List   Diagnosis    Raynaud's syndrome    Herpes simplex virus infection    RBBB (right bundle branch block with left anterior fascicular block)    Atrial enlargement, left       History reviewed. No pertinent past medical history.    Past Surgical History:   Procedure Laterality Date    COLONOSCOPY WITH CO2 INSUFFLATION N/A 12/7/2023    Procedure: Colonoscopy with CO2 insufflation;  Surgeon: Keara Belle DO;  Location: [facility] OR    ENT SURGERY      mouth    LASIK      monovision - left is near eye    SOFT TISSUE  "SURGERY      remove needle from foot       Family History   Problem Relation Age of Onset    Diabetes Father     Glaucoma No family hx of     Macular Degeneration No family hx of        Social History     Tobacco Use    Smoking status: Former     Passive exposure: Never    Smokeless tobacco: Never   Substance Use Topics    Alcohol use: Yes         Exam:    Vitals: /81   Pulse 60   Resp 16   Ht 1.778 m (5' 10\")   Wt 90.3 kg (199 lb)   SpO2 98%   BMI 28.55 kg/m    BMI: Body mass index is 28.55 kg/m .  Height: 5' 10\"    Constitutional/ general:  Pt is in no apparent distress, appears well-nourished.  Cooperative with history and physical exam.     Psych:  The patient answered questions appropriately.  Normal affect.  Seems to have reasonable expectations, in terms of treatment.     Lungs:  Non labored breathing, non labored speech. No cough.  No audible wheezing. Even, quiet breathing.       Vascular:  positive pedal pulses bilaterally for both the DP and PT arteries.  CFT < 3 sec.  negative ankle edema.  positive pedal hair growth.    Neuro:  Alert and oriented x 3. Coordinated gait.  Light touch sensation is diminished.    Derm: Normal texture and turgor.  No erythema, ecchymosis, or cyanosis.      Musculoskeletal:    Lower extremity muscle strength is normal.  Patient is ambulatory without an assistive device or brace.  No gross deformities.   Cavus foot with weightbearing bilateral.  No forefoot or rear foot deformities noted.    Normal ROM all fore foot and rearfoot joints with no pain or crepitus.  No equinus.  No pain with range of motion.  No pain anywhere at tarsometatarsal joint.  No pain stressing or palpating peroneal tendons or extensor tendons.  No pain with range of motion of subtalar joint or ankle joint.  No pain in calcaneocuboid joint.  No calcaneal compression pain.  No pain lateral ankle gutter.  No pain on any extensor tendons.  She has pain between EHL and EDL.  Hard to elicit pain " anywhere else.  No edema erythema or ecchymosis noted.  We do note slight thickening skin anterior lateral ankle from 1 patient sitting crosslegged.    Radiographic Exam:  X-Ray Findings:  I personally reviewed the films.  Unremarkable    A:  left cavus foot with central anterior ankle pain    Plan:  X-rays taken today of left ankle.  Discussed with patient these are unremarkable.  Explained to patient she has high volume foot.  Explained this type of foot is more prone to pressure dorsum of foot and anterior ankle.  She will make sure this is offloaded at all times.  Explained that her meditating every day for 20 minutes crossing her feet could be causing this.  She will stop this.  Even while she is sleeping she will make sure this is offloaded.  Again we will not tie shoes too tight.  Will ice 3 times a day for 20 minutes.  We use Voltaren gel on here.  Hopefully this will slowly get better.  If not improving discussed next step MRI to evaluate ankle.  She can call if she would like to do this.  RTC as needed.  Thank you for allowing me participate in the care of this patient.        Juancho Saenz, ZANA, FACFAS

## 2024-11-27 NOTE — LETTER
11/27/2024      Mary Irizarry  [address] [facility], [place] [address]      Dear Colleague,    Thank you for referring your patient, Mary Irizarry, to the [facility]. Please see a copy of my visit note below.    Subjective:    Pt is seen today in consult from Dr. Mills with the c/c of painful left foot.  This has been symptomatic for the past 6 months.  Points to anterior central ankle.  States sometimes walking and she feels as though it is catching.  Pt denies any hx of trauma.  Patient was gardening a lot at the time and using a shovel.  She is wondering if this caused this.  Patient states she meditates every day.  She is sitting crosslegged for 20 minutes.  Has had to do this less because of bothers it.  She never had any edema ecchymosis erythema weakness or numbness.  Aggravated by activity and releaved by rest.  Describes as burning pain.  Bothers her daily.  Bothers her when she walks.  Has history of hyperparathyroidism and osteoporosis.  History of numbness and tingling in all toes and will be seeing neurology.    ROS:  see above         Allergies   Allergen Reactions     Levaquin [Levofloxacin] Other (See Comments)     tendonitis     Penicillins Rash       Current Outpatient Medications   Medication Sig Dispense Refill     acyclovir (ZOVIRAX) 400 MG tablet TAKE 1 TABLET BY MOUTH EVERY DAY MAY TAKE 1 TAB TWICE DAILY FOR 5 DAYS FOR BREAK OUTS. 120 tablet 3     cetirizine (ZYRTEC) 10 MG tablet Take 1 tablet (10 mg) by mouth daily. Needs to be seen for further refills 90 tablet 3     zolpidem (AMBIEN) 5 MG tablet Take 1 tablet (5 mg) by mouth nightly as needed for sleep. 10 tablet 0       Patient Active Problem List   Diagnosis     Raynaud's syndrome     Herpes simplex virus infection     RBBB (right bundle branch block with left anterior fascicular block)     Atrial enlargement, left       History reviewed. No pertinent past medical history.    Past Surgical History:  "  Procedure Laterality Date     COLONOSCOPY WITH CO2 INSUFFLATION N/A 12/7/2023    Procedure: Colonoscopy with CO2 insufflation;  Surgeon: Keara Belle DO;  Location: [facility] OR     ENT SURGERY      mouth     LASIK      monovision - left is near eye     SOFT TISSUE SURGERY      remove needle from foot       Family History   Problem Relation Age of Onset     Diabetes Father      Glaucoma No family hx of      Macular Degeneration No family hx of        Social History     Tobacco Use     Smoking status: Former     Passive exposure: Never     Smokeless tobacco: Never   Substance Use Topics     Alcohol use: Yes         Exam:    Vitals: /81   Pulse 60   Resp 16   Ht 1.778 m (5' 10\")   Wt 90.3 kg (199 lb)   SpO2 98%   BMI 28.55 kg/m    BMI: Body mass index is 28.55 kg/m .  Height: 5' 10\"    Constitutional/ general:  Pt is in no apparent distress, appears well-nourished.  Cooperative with history and physical exam.     Psych:  The patient answered questions appropriately.  Normal affect.  Seems to have reasonable expectations, in terms of treatment.     Lungs:  Non labored breathing, non labored speech. No cough.  No audible wheezing. Even, quiet breathing.       Vascular:  positive pedal pulses bilaterally for both the DP and PT arteries.  CFT < 3 sec.  negative ankle edema.  positive pedal hair growth.    Neuro:  Alert and oriented x 3. Coordinated gait.  Light touch sensation is diminished.    Derm: Normal texture and turgor.  No erythema, ecchymosis, or cyanosis.      Musculoskeletal:    Lower extremity muscle strength is normal.  Patient is ambulatory without an assistive device or brace.  No gross deformities.   Cavus foot with weightbearing bilateral.  No forefoot or rear foot deformities noted.    Normal ROM all fore foot and rearfoot joints with no pain or crepitus.  No equinus.  No pain with range of motion.  No pain anywhere at tarsometatarsal joint.  No pain stressing or palpating peroneal tendons or " extensor tendons.  No pain with range of motion of subtalar joint or ankle joint.  No pain in calcaneocuboid joint.  No calcaneal compression pain.  No pain lateral ankle gutter.  No pain on any extensor tendons.  She has pain between EHL and EDL.  Hard to elicit pain anywhere else.  No edema erythema or ecchymosis noted.  We do note slight thickening skin anterior lateral ankle from 1 patient sitting crosslegged.    Radiographic Exam:  X-Ray Findings:  I personally reviewed the films.  Unremarkable    A:  left cavus foot with central anterior ankle pain    Plan:  X-rays taken today of left ankle.  Discussed with patient these are unremarkable.  Explained to patient she has high volume foot.  Explained this type of foot is more prone to pressure dorsum of foot and anterior ankle.  She will make sure this is offloaded at all times.  Explained that her meditating every day for 20 minutes crossing her feet could be causing this.  She will stop this.  Even while she is sleeping she will make sure this is offloaded.  Again we will not tie shoes too tight.  Will ice 3 times a day for 20 minutes.  We use Voltaren gel on here.  Hopefully this will slowly get better.  If not improving discussed next step MRI to evaluate ankle.  She can call if she would like to do this.  RTC as needed.  Thank you for allowing me participate in the care of this patient.        Juancho Saenz DPM, FACFAS          Again, thank you for allowing me to participate in the care of your patient.        Sincerely,        Juancho Saenz DPM

## 2024-11-28 DIAGNOSIS — E21.3 HYPERPARATHYROIDISM (H): Primary | ICD-10-CM

## 2024-12-05 ENCOUNTER — TELEPHONE (OUTPATIENT)
Dept: ENDOCRINOLOGY | Facility: CLINIC | Age: 64
End: 2024-12-05
Payer: MEDICAID

## 2024-12-23 ENCOUNTER — HOSPITAL ENCOUNTER (OUTPATIENT)
Dept: NUCLEAR MEDICINE | Facility: CLINIC | Age: 64
Setting detail: NUCLEAR MEDICINE
Discharge: HOME | End: 2024-12-23
Attending: STUDENT IN AN ORGANIZED HEALTH CARE EDUCATION/TRAINING PROGRAM
Payer: MEDICAID

## 2024-12-23 DIAGNOSIS — E21.3 HYPERPARATHYROIDISM (H): ICD-10-CM

## 2024-12-23 PROCEDURE — A9500 TC99M SESTAMIBI: HCPCS | Performed by: STUDENT IN AN ORGANIZED HEALTH CARE EDUCATION/TRAINING PROGRAM

## 2024-12-23 PROCEDURE — 343N000001 HC RX 343 MED OP 636: Performed by: STUDENT IN AN ORGANIZED HEALTH CARE EDUCATION/TRAINING PROGRAM

## 2024-12-23 PROCEDURE — 78072 PARATHYRD PLANAR W/SPECT&CT: CPT

## 2024-12-23 PROCEDURE — A9516 IODINE I-123 SOD IODIDE MIC: HCPCS | Performed by: STUDENT IN AN ORGANIZED HEALTH CARE EDUCATION/TRAINING PROGRAM

## 2024-12-23 RX ADMIN — Medication 629 MICROCURIE: at 08:12

## 2024-12-23 RX ADMIN — KIT FOR THE PREPARATION OF TECHNETIUM TC99M SESTAMIBI 27 MILLICURIE: 1 INJECTION, POWDER, LYOPHILIZED, FOR SOLUTION PARENTERAL at 11:10

## 2025-01-08 ENCOUNTER — APPOINTMENT (OUTPATIENT)
Dept: URBAN - METROPOLITAN AREA CLINIC 252 | Age: 65
Setting detail: DERMATOLOGY
End: 2025-01-10

## 2025-01-08 VITALS — WEIGHT: 190 LBS | HEIGHT: 70 IN

## 2025-01-08 DIAGNOSIS — L82.1 OTHER SEBORRHEIC KERATOSIS: ICD-10-CM

## 2025-01-08 DIAGNOSIS — L72.8 OTHER FOLLICULAR CYSTS OF THE SKIN AND SUBCUTANEOUS TISSUE: ICD-10-CM

## 2025-01-08 DIAGNOSIS — Z71.89 OTHER SPECIFIED COUNSELING: ICD-10-CM

## 2025-01-08 DIAGNOSIS — Z41.9 ENCOUNTER FOR PROCEDURE FOR PURPOSES OTHER THAN REMEDYING HEALTH STATE, UNSPECIFIED: ICD-10-CM

## 2025-01-08 PROCEDURE — OTHER COUNSELING: OTHER

## 2025-01-08 PROCEDURE — OTHER ADDITIONAL NOTES: OTHER

## 2025-01-08 PROCEDURE — OTHER LIQUID NITROGEN (COSMETIC): OTHER

## 2025-01-08 PROCEDURE — OTHER MIPS QUALITY: OTHER

## 2025-01-08 PROCEDURE — 99213 OFFICE O/P EST LOW 20 MIN: CPT

## 2025-01-08 PROCEDURE — OTHER BOTOX: OTHER

## 2025-01-08 ASSESSMENT — LOCATION ZONE DERM
LOCATION ZONE: ANUS
LOCATION ZONE: FACE
LOCATION ZONE: LEG
LOCATION ZONE: TRUNK

## 2025-01-08 ASSESSMENT — LOCATION DETAILED DESCRIPTION DERM
LOCATION DETAILED: RIGHT MID-UPPER BACK
LOCATION DETAILED: RIGHT SUPERIOR LATERAL MALAR CHEEK
LOCATION DETAILED: PERINEAL BODY
LOCATION DETAILED: LEFT SUPERIOR LATERAL LOWER BACK
LOCATION DETAILED: RIGHT DISTAL PRETIBIAL REGION
LOCATION DETAILED: LEFT MEDIAL FOREHEAD
LOCATION DETAILED: INFERIOR THORACIC SPINE

## 2025-01-08 ASSESSMENT — LOCATION SIMPLE DESCRIPTION DERM
LOCATION SIMPLE: LEFT LOWER BACK
LOCATION SIMPLE: PERINEAL BODY
LOCATION SIMPLE: LEFT FOREHEAD
LOCATION SIMPLE: UPPER BACK
LOCATION SIMPLE: RIGHT PRETIBIAL REGION
LOCATION SIMPLE: RIGHT CHEEK
LOCATION SIMPLE: RIGHT UPPER BACK

## 2025-01-08 NOTE — PROCEDURE: COUNSELING
Patient Specific Counseling (Will Not Stick From Patient To Patient): Recommended warm cloth compresses in the area.
Detail Level: Simple
Patient Specific Counseling (Will Not Stick From Patient To Patient): - Seborrheic Keratoses (SKs) are benign growths related to age and genetics typically and may grow slowly over time.\\n- The most common method for treatment is liquid nitrogen cryosurgery.\\n- More will likely develop over time and treated lesions may recur.\\n- Patient requested treatment today.\\n- Advised that treatment is available but not medically necessary.\\n- Recommended treatment with cryosurgery and discussed cost of treatment.\\n- Resolution and non-recurrence cannot be guaranteed.\\n- Patient expressed understanding.
Detail Level: Zone
Detail Level: Generalized

## 2025-01-08 NOTE — HPI: SKIN LESION
What Type Of Note Output Would You Prefer (Optional)?: Bullet Format
How Severe Is Your Skin Lesion?: mild
Has Your Skin Lesion Been Treated?: not been treated
Is This A New Presentation, Or A Follow-Up?: Growth
Additional History: Pt would like provider to take a look at spot to evaluate if it needs further evaluation by a doctor.

## 2025-01-08 NOTE — PROCEDURE: LIQUID NITROGEN (COSMETIC)
Billing Information: Bill by Static Price
Render Post-Care Instructions In Note?: yes
Spray Paint Technique: No
Spray Paint Text: The liquid nitrogen was applied to the skin utilizing a spray paint frosting technique.
Post-Care Instructions: - Patient was instructed to avoid picking at any of the treated lesions.\\n- Discussed the expectation that after the lesions falls off, there will be a pink spot that will fade/resolve over several weeks.
Consent: - Consent was obtained and risks were reviewed prior to procedure today. All questions were answered prior to procedure today.\\n- Risks discussed include but are not limited to pain, crusting, scabbing, blistering, scarring, temporary or permanent darker or lighter pigmentary change, recurrence, incomplete resolution, and infection. \\n- The patient understands that the procedure is cosmetic in nature and is not covered by or billable to insurance.
Price (Use Numbers Only, No Special Characters Or $): 0
Detail Level: Detailed

## 2025-01-08 NOTE — PROCEDURE: BOTOX
Detail Level: Detailed
Post-Care Instructions: Patient instructed to not lie down for 4 hours and limit physical activity for 24 hours.
Map Statement: Please see attached map for locations and injection amounts.
Lot #: [id number]
Price (Use Numbers Only, No Special Characters Or $): 544
Expiration Date (Month Year): 2025/09
Show Inventory Tab: Show
Total Units: 36
Consent: COLOR KEY FOR DIAGRAM BELOW:\\nRed = 1/2 unit of Jeuveau per injection\\nOrange = 1 units of Jeuveau per injection\\nYellow = 2 units of Jeuveau per injection\\nGreen = 3 units of Jeuveau per injection\\nBlue = 4 units of Jeuveau per injection\\nBrown = 5 units of Jeuveau per injection\\n\\nWritten consent obtained. Risks include but not limited to lid/brow ptosis, bruising, swelling, diplopia, temporary effect, incomplete chemical denervation.

## 2025-01-08 NOTE — PROCEDURE: ADDITIONAL NOTES
Render Risk Assessment In Note?: no
Additional Notes: Courtesy second treatment post first treatment on February 2024.
Detail Level: Simple

## 2025-01-15 ENCOUNTER — OFFICE VISIT (OUTPATIENT)
Dept: ENDOCRINOLOGY | Facility: CLINIC | Age: 65
End: 2025-01-15
Payer: MEDICAID

## 2025-01-15 VITALS
DIASTOLIC BLOOD PRESSURE: 87 MMHG | RESPIRATION RATE: 16 BRPM | SYSTOLIC BLOOD PRESSURE: 128 MMHG | HEART RATE: 58 BPM | BODY MASS INDEX: 27.55 KG/M2 | WEIGHT: 192 LBS | OXYGEN SATURATION: 98 %

## 2025-01-15 DIAGNOSIS — E21.3 HYPERPARATHYROIDISM: Primary | ICD-10-CM

## 2025-01-15 NOTE — PROGRESS NOTES
Endocrinology Clinic Visit        NAME:  Mary Irizarry  PCP:  Carol Jaeger  MRN:  [id number]  Reason for Consult:  hypercalcemia  Requesting Provider:  Referred Self    Chief Complaint     Chief Complaint   Patient presents with    Follow Up     hyperparathyroid       History of Present Illness     Mary Irizarry is a 64 year old female who is seen for hypercalcemia follow-up . Last seen 6/2024.     Her oldest lab was 5/2019, ca was elevated at 10.7 and remained elevated since then. No prior labs , she was not seeing doctors in the past. Most recent labs:     Latest Ref Rng 11/13/2024  3:30 PM 11/15/2024  5:55 AM   ENDO CALCIUM LABS-UMP      Albumin 3.4 - 5.0 g/dL     Albumin 3.5 - 5.2 g/dL 4.3     Alkaline Phosphatase 40 - 150 U/L 72     Calcium 8.8 - 10.4 mg/dL 11.2 (H)     Calcium Urine g/24 h 0.10 - 0.30 g/spec  0.57 (H)    Calcium Urine mg/dL mg/dL  17.9    CK Total 30 - 225 U/L     Urea Nitrogen 7 - 30 mg/dL     Urea Nitrogen 8.0 - 23.0 mg/dL 16.8     Creatinine 0.51 - 0.95 mg/dL 0.84     Magnesium 1.7 - 2.3 mg/dL     Parathyroid Hormone Intact 15 - 65 pg/mL 89 (H)      phosphorus  3.8 on 7/2023  Vitd 33     Dxa scan 8/2023:  FINDINGS:               Lumbar Spine (L1-L4)      T-score: -2.0, marked degenerative changes present                Left Femoral Neck            T-score:  -1.8               Right Femoral Neck          T-score:  -1.9               Forearm (radius 33%)      T-score:  -2.9                  Lumbar (L1-L4) BMD: 0.945                             Total Hip Mean BMD: 0.782                 Forearm (radius 33%) BMD: 0.508    Parathyroid sestamibi : 11/2023 Findings highly suspicious for a parathyroid adenoma located along the right lateral margin of the upper esophagus and posterior to the inferior right thyroid lobe.       Symptoms of hypercalcemia: chronic constipation, no dyspepsia, no mental status changes/lethargy but tired, ? polyuria.    Calcium intake: Dietary: no  milk, cheese every day , yogurt sometimes, a lot of greens. Supplements: yes unsure of dose prescribed by chiropractor     Vitamin D intake: Supplements: yes, unsure of dose    Previous fractures: No  Family history of fragility fracture in parent: N/A she does not know her FH  History of kidney stones: No  History of kidney disease: No  Family history of any calcium problems or kidney stones: N/A  History of vitamin D deficiency: No  History of HCTZ use: No  History of Lithium use: No  History of malabsorption (IBD, Celiac, gastric bypass ): No  History of thyroid disease: No  Menopause at age : late 40s  Non smoker, limited alcohol      Social: she is an assisted .    Problem List     Patient Active Problem List   Diagnosis    Raynaud's syndrome    Herpes simplex virus infection    RBBB (right bundle branch block with left anterior fascicular block)    Atrial enlargement, left        Medications     Current Outpatient Medications   Medication Sig Dispense Refill    acyclovir (ZOVIRAX) 400 MG tablet TAKE 1 TABLET BY MOUTH EVERY DAY MAY TAKE 1 TAB TWICE DAILY FOR 5 DAYS FOR BREAK OUTS. 120 tablet 3    zolpidem (AMBIEN) 5 MG tablet Take 1 tablet (5 mg) by mouth nightly as needed for sleep. 10 tablet 0    cetirizine (ZYRTEC) 10 MG tablet Take 1 tablet (10 mg) by mouth daily. Needs to be seen for further refills (Patient not taking: Reported on 1/15/2025) 90 tablet 3     Current Facility-Administered Medications   Medication Dose Route Frequency Provider Last Rate Last Admin    BUPivacaine (MARCAINE) 0.25 % injection 4 mL  4 mL   Freedom Chapman PA   4 mL at 09/08/23 1144    triamcinolone (KENALOG-40) injection 80 mg  80 mg   Freedom Chapman PA   80 mg at 09/08/23 1144        Allergies     Allergies   Allergen Reactions    Levaquin [Levofloxacin] Other (See Comments)     tendonitis    Penicillins Rash       Medical / Surgical History     No past medical history on file.  Past Surgical History:    Procedure Laterality Date    COLONOSCOPY WITH CO2 INSUFFLATION N/A 12/7/2023    Procedure: Colonoscopy with CO2 insufflation;  Surgeon: Keara Belle DO;  Location: [facility] OR    ENT SURGERY      mouth    LASIK      monovision - left is near eye    SOFT TISSUE SURGERY      remove needle from foot       Social History     Social History     Socioeconomic History    Marital status: Single     Spouse name: Not on file    Number of children: Not on file    Years of education: Not on file    Highest education level: Not on file   Occupational History    Not on file   Tobacco Use    Smoking status: Former     Passive exposure: Never    Smokeless tobacco: Never   Vaping Use    Vaping status: Never Used   Substance and Sexual Activity    Alcohol use: Yes    Drug use: No    Sexual activity: Never   Other Topics Concern    Parent/sibling w/ CABG, MI or angioplasty before 65F 55M? Not Asked   Social History Narrative    Not on file     Social Drivers of Health     Financial Resource Strain: High Risk (12/23/2021)    [facility] [employer]    Financial Resource Strain     Difficulty of Paying Living Expenses: Not on file     Difficulty of Paying Living Expenses: Not on file   Food Insecurity: Not on file   Transportation Needs: Not on file   Physical Activity: Not on file   Stress: Not on file   Social Connections: Unknown (12/23/2021)    Received from [employer], [employer]    Social Connections     Frequency of Communication with Friends and Family: Not on file   Interpersonal Safety: Low Risk  (11/13/2024)    Interpersonal Safety     Do you feel physically and emotionally safe where you currently live?: Yes     Within the past 12 months, have you been hit, slapped, kicked or otherwise physically hurt by someone?: No     Within the past 12 months, have you been humiliated or  "emotionally abused in other ways by your partner or ex-partner?: No   Housing Stability: Not on file       Family History     Family History   Problem Relation Age of Onset    Diabetes Father     Glaucoma No family hx of     Macular Degeneration No family hx of        ROS     12 ROS completed, pertinent positive and negative in HPI  Numbness in the feet bilaterally.     Physical Exam   /87   Pulse 58   Resp 16   Wt 87.1 kg (192 lb)   SpO2 98%   BMI 27.55 kg/m     GENERAL: alert and no distress  EYES: Eyes grossly normal to inspection.  No discharge or erythema, or obvious scleral/conjunctival abnormalities.  RESP: No audible wheeze, cough, or visible cyanosis.    SKIN: Visible skin clear. No significant rash, abnormal pigmentation or lesions.  NEURO: Cranial nerves grossly intact.  Mentation and speech appropriate for age.  PSYCH: Appropriate affect, tone, and pace of words     Labs/Imaging     Pertinent Labs were reviewed and updated in EPIC and discussed  .  Radiology Results were  reviewed and updated in EPIC and discussed briefly.    Summary of recent findings:   Lab Results   Component Value Date    A1C 5.6 02/22/2024       TSH   Date Value Ref Range Status   02/22/2024 1.36 0.30 - 4.20 uIU/mL Final   09/10/2018 1.03 0.40 - 4.00 mU/L Final     T4 Free   Date Value Ref Range Status   09/10/2018 0.84 0.76 - 1.46 ng/dL Final       Creatinine   Date Value Ref Range Status   11/13/2024 0.84 0.51 - 0.95 mg/dL Final   03/11/2020 0.92 0.52 - 1.04 mg/dL Final       Recent Labs   Lab Test 07/31/23  1501 03/11/20  1904   CHOL 220* 236*   HDL 77 95   * 125*   TRIG 54 79       No results found for: \"[id number]\", \"[id number]\", \"[id number]\"    I personally reviewed the patient's outside records from [facility] EMR and Care Everywhere. Summary of pertinent findings in HPI.    Impression / Plan     1. Hypercalcemia  2. hyperparathyroidism  3. Osteoporosis   4. Hypercalciuria   5. parathyroid adenoma     Work up " confirms primary hyperparathyroidism.      We again reviewed the latest guidelines conclude that surgery for hyperparathyroidism is indicated in symptomatic patients, or in asymptomatic patients who meet any one of the following conditions:  Serum calcium concentration of 1.0 mg/dL (0.25 mmol/L) or more above the upper limit of normal   Creatnine clearance reduced to < 60 mL/min, or 24-hour urine for calcium > 400 mg/day and increased stone risk by biochemical stone risk analysis, or nephrolithiasis or nephrocalcinosis on imaging studyCreatinine clearance that is reduced to <60 mL/min   Bone density at the hip, lumbar spine, or distal radius that is more than 2.5 standard deviations below peak bone mass (T score <-2.5) and/or previous fragility fracture   Age less than 50 years       As far as calcium intake, even in cases of hyperparathyroidism, it is recommended to keep up calcium intake to about 1000 mg daily, to prevent further increase in PTH and bone disease. I advised that to the patient.    I have discussed with the patient the procedure of cervical exploration, parathyroidectomy. Reviewed the risks, benefits, potential complications, length of hospitalization, disability, including bleeding, infection, injury to blood vessel, nerve structures of the neck including the recurrent laryngeal nerves as well as permanent hypocalcemia. After a full discussion, she understands and would like to proceed.      She is not sure what happened with her ENT referral . I placed anew one.     We discussed treating her OP with fosamax in the meantime. She prefers to hold off for now. Plan to check her dexa one year after parathyroidectomy.        Test and/or medications prescribed today:  Orders Placed This Encounter   Procedures    Adult ENT  Referral         Follow up: 6 month    The longitudinal plan of care for the diagnosis(es)/condition(s) as documented were addressed during this visit. Due to the added  complexity in care, I will continue to support Mary in the subsequent management and with ongoing continuity of care.    Nura Wang MD  Endocrinology, Diabetes and Metabolism  [facility]

## 2025-01-15 NOTE — NURSING NOTE
Mary Irizarry's goals for this visit include:   Chief Complaint   Patient presents with    Follow Up     hyperparathyroid       She requests these members of her care team be copied on today's visit information: yes    PCP: Jacques Mills    Referring Provider:  Referred Self, MD  No address on file    /87   Pulse 58   Resp 16   Wt 87.1 kg (192 lb)   SpO2 98%   BMI 27.55 kg/m      Do you need any medication refills at today's visit? None    Rex Rey, EMT

## 2025-01-15 NOTE — LETTER
1/15/2025      Mary Irizarry  [address] [facility] [address]      Dear Colleague,    Thank you for referring your patient, Mary Irizarry, to the [facility]. Please see a copy of my visit note below.    Endocrinology Clinic Visit        NAME:  Mary Irizarry  PCP:  Carol Jaeger  MRN:  [id number]  Reason for Consult:  hypercalcemia  Requesting Provider:  Referred Self    Chief Complaint     Chief Complaint   Patient presents with     Follow Up     hyperparathyroid       History of Present Illness     Mary Irizarry is a 64 year old female who is seen for hypercalcemia follow-up . Last seen 6/2024.     Her oldest lab was 5/2019, ca was elevated at 10.7 and remained elevated since then. No prior labs , she was not seeing doctors in the past. Most recent labs:     Latest Ref Rng 11/13/2024  3:30 PM 11/15/2024  5:55 AM   ENDO CALCIUM LABS-UMP      Albumin 3.4 - 5.0 g/dL     Albumin 3.5 - 5.2 g/dL 4.3     Alkaline Phosphatase 40 - 150 U/L 72     Calcium 8.8 - 10.4 mg/dL 11.2 (H)     Calcium Urine g/24 h 0.10 - 0.30 g/spec  0.57 (H)    Calcium Urine mg/dL mg/dL  17.9    CK Total 30 - 225 U/L     Urea Nitrogen 7 - 30 mg/dL     Urea Nitrogen 8.0 - 23.0 mg/dL 16.8     Creatinine 0.51 - 0.95 mg/dL 0.84     Magnesium 1.7 - 2.3 mg/dL     Parathyroid Hormone Intact 15 - 65 pg/mL 89 (H)      phosphorus  3.8 on 7/2023  Vitd 33     Dxa scan 8/2023:  FINDINGS:               Lumbar Spine (L1-L4)      T-score: -2.0, marked degenerative changes present                Left Femoral Neck            T-score:  -1.8               Right Femoral Neck          T-score:  -1.9               Forearm (radius 33%)      T-score:  -2.9                  Lumbar (L1-L4) BMD: 0.945                             Total Hip Mean BMD: 0.782                 Forearm (radius 33%) BMD: 0.508    Parathyroid sestamibi : 11/2023 Findings highly suspicious for a parathyroid adenoma located along the right  lateral margin of the upper esophagus and posterior to the inferior right thyroid lobe.       Symptoms of hypercalcemia: chronic constipation, no dyspepsia, no mental status changes/lethargy but tired, ? polyuria.    Calcium intake: Dietary: no milk, cheese every day , yogurt sometimes, a lot of greens. Supplements: yes unsure of dose prescribed by chiropractor     Vitamin D intake: Supplements: yes, unsure of dose    Previous fractures: No  Family history of fragility fracture in parent: N/A she does not know her FH  History of kidney stones: No  History of kidney disease: No  Family history of any calcium problems or kidney stones: N/A  History of vitamin D deficiency: No  History of HCTZ use: No  History of Lithium use: No  History of malabsorption (IBD, Celiac, gastric bypass ): No  History of thyroid disease: No  Menopause at age : late 40s  Non smoker, limited alcohol      Social: she is an assisted .    Problem List     Patient Active Problem List   Diagnosis     Raynaud's syndrome     Herpes simplex virus infection     RBBB (right bundle branch block with left anterior fascicular block)     Atrial enlargement, left        Medications     Current Outpatient Medications   Medication Sig Dispense Refill     acyclovir (ZOVIRAX) 400 MG tablet TAKE 1 TABLET BY MOUTH EVERY DAY MAY TAKE 1 TAB TWICE DAILY FOR 5 DAYS FOR BREAK OUTS. 120 tablet 3     zolpidem (AMBIEN) 5 MG tablet Take 1 tablet (5 mg) by mouth nightly as needed for sleep. 10 tablet 0     cetirizine (ZYRTEC) 10 MG tablet Take 1 tablet (10 mg) by mouth daily. Needs to be seen for further refills (Patient not taking: Reported on 1/15/2025) 90 tablet 3     Current Facility-Administered Medications   Medication Dose Route Frequency Provider Last Rate Last Admin     BUPivacaine (MARCAINE) 0.25 % injection 4 mL  4 mL   Freedom Chapman PA   4 mL at 09/08/23 1144     triamcinolone (KENALOG-40) injection 80 mg  80 mg   Freedom Chapman PA    80 mg at 09/08/23 1144        Allergies     Allergies   Allergen Reactions     Levaquin [Levofloxacin] Other (See Comments)     tendonitis     Penicillins Rash       Medical / Surgical History     No past medical history on file.  Past Surgical History:   Procedure Laterality Date     COLONOSCOPY WITH CO2 INSUFFLATION N/A 12/7/2023    Procedure: Colonoscopy with CO2 insufflation;  Surgeon: Keara Belle DO;  Location: [facility] OR     ENT SURGERY      mouth     LASIK      monovision - left is near eye     SOFT TISSUE SURGERY      remove needle from foot       Social History     Social History     Socioeconomic History     Marital status: Single     Spouse name: Not on file     Number of children: Not on file     Years of education: Not on file     Highest education level: Not on file   Occupational History     Not on file   Tobacco Use     Smoking status: Former     Passive exposure: Never     Smokeless tobacco: Never   Vaping Use     Vaping status: Never Used   Substance and Sexual Activity     Alcohol use: Yes     Drug use: No     Sexual activity: Never   Other Topics Concern     Parent/sibling w/ CABG, MI or angioplasty before 65F 55M? Not Asked   Social History Narrative     Not on file     Social Drivers of Health     Financial Resource Strain: High Risk (12/23/2021)    [facility]    Financial Resource Strain      Difficulty of Paying Living Expenses: Not on file      Difficulty of Paying Living Expenses: Not on file   Food Insecurity: Not on file   Transportation Needs: Not on file   Physical Activity: Not on file   Stress: Not on file   Social Connections: Unknown (12/23/2021)    [facility]    Social Connections      Frequency of Communication with Friends and Family: Not on file   Interpersonal Safety: Low Risk  (11/13/2024)  "   Interpersonal Safety      Do you feel physically and emotionally safe where you currently live?: Yes      Within the past 12 months, have you been hit, slapped, kicked or otherwise physically hurt by someone?: No      Within the past 12 months, have you been humiliated or emotionally abused in other ways by your partner or ex-partner?: No   Housing Stability: Not on file       Family History     Family History   Problem Relation Age of Onset     Diabetes Father      Glaucoma No family hx of      Macular Degeneration No family hx of        ROS     12 ROS completed, pertinent positive and negative in HPI  Numbness in the feet bilaterally.     Physical Exam   /87   Pulse 58   Resp 16   Wt 87.1 kg (192 lb)   SpO2 98%   BMI 27.55 kg/m     GENERAL: alert and no distress  EYES: Eyes grossly normal to inspection.  No discharge or erythema, or obvious scleral/conjunctival abnormalities.  RESP: No audible wheeze, cough, or visible cyanosis.    SKIN: Visible skin clear. No significant rash, abnormal pigmentation or lesions.  NEURO: Cranial nerves grossly intact.  Mentation and speech appropriate for age.  PSYCH: Appropriate affect, tone, and pace of words     Labs/Imaging     Pertinent Labs were reviewed and updated in EPIC and discussed  .  Radiology Results were  reviewed and updated in EPIC and discussed briefly.    Summary of recent findings:   Lab Results   Component Value Date    A1C 5.6 02/22/2024       TSH   Date Value Ref Range Status   02/22/2024 1.36 0.30 - 4.20 uIU/mL Final   09/10/2018 1.03 0.40 - 4.00 mU/L Final     T4 Free   Date Value Ref Range Status   09/10/2018 0.84 0.76 - 1.46 ng/dL Final       Creatinine   Date Value Ref Range Status   11/13/2024 0.84 0.51 - 0.95 mg/dL Final   03/11/2020 0.92 0.52 - 1.04 mg/dL Final       Recent Labs   Lab Test 07/31/23  1501 03/11/20  1904   CHOL 220* 236*   HDL 77 95   * 125*   TRIG 54 79       No results found for: \"VHDE84QGMHI\", \"[id number]\", " "\"[id number]\"    I personally reviewed the patient's outside records from [facility] EMR and Care Everywhere. Summary of pertinent findings in HPI.    Impression / Plan     1. Hypercalcemia  2. hyperparathyroidism  3. Osteoporosis   4. Hypercalciuria   5. parathyroid adenoma     Work up confirms primary hyperparathyroidism.      We again reviewed the latest guidelines conclude that surgery for hyperparathyroidism is indicated in symptomatic patients, or in asymptomatic patients who meet any one of the following conditions:  Serum calcium concentration of 1.0 mg/dL (0.25 mmol/L) or more above the upper limit of normal   Creatnine clearance reduced to < 60 mL/min, or 24-hour urine for calcium > 400 mg/day and increased stone risk by biochemical stone risk analysis, or nephrolithiasis or nephrocalcinosis on imaging studyCreatinine clearance that is reduced to <60 mL/min   Bone density at the hip, lumbar spine, or distal radius that is more than 2.5 standard deviations below peak bone mass (T score <-2.5) and/or previous fragility fracture   Age less than 50 years       As far as calcium intake, even in cases of hyperparathyroidism, it is recommended to keep up calcium intake to about 1000 mg daily, to prevent further increase in PTH and bone disease. I advised that to the patient.    I have discussed with the patient the procedure of cervical exploration, parathyroidectomy. Reviewed the risks, benefits, potential complications, length of hospitalization, disability, including bleeding, infection, injury to blood vessel, nerve structures of the neck including the recurrent laryngeal nerves as well as permanent hypocalcemia. After a full discussion, she understands and would like to proceed.      She is not sure what happened with her ENT referral . I placed anew one.     We discussed treating her OP with fosamax in the meantime. She prefers to hold off for now. Plan to check her dexa one year after " parathyroidectomy.        Test and/or medications prescribed today:  Orders Placed This Encounter   Procedures     Adult ENT  Referral         Follow up: 6 month    The longitudinal plan of care for the diagnosis(es)/condition(s) as documented were addressed during this visit. Due to the added complexity in care, I will continue to support Mary in the subsequent management and with ongoing continuity of care.    Nura Wang MD  Endocrinology, Diabetes and Metabolism  [facility]        Again, thank you for allowing me to participate in the care of your patient.        Sincerely,        Nura Wang MD    Electronically signed

## 2025-01-15 NOTE — PATIENT INSTRUCTIONS
Welcome to the [facility] Endocrinology [facility] Diabetes Clinics     ENT referral: [facility] will call you to coordinate your care as prescribed by the provider. If you don t hear from a representative within 2 business days, please call [phone].   Dr. Lan or Dr. Abrams        Our Endocrinology Clinics are here to provide you with a team-based, collaborative approach in the diagnosis and treatment of patients with diabetes and endocrine disorders. The team is made up of Physicians, Physician Assistants, Certified Diabetes Educators, Registered Nurses, Medical Assistants, Emergency Medical Technicians, and many others, all of whom have the unified goal of providing our patients with high quality care.     Please see below for some helpful tips to best navigate and use the [facility] Endocrinology clinic:     [place] Respect: At [facility], we are committed to a respectful and safe space for all patients, visitors, and staff.  We believe that mutual respect between patients and their care team is the foundation of quality care.  It is our expectation that you will be treated with respect by your care team.  In turn, we ask that all communication with the care team (written and verbal) be respectful and free from profanity, threatening, or abusive language.  Disrespectful communication undermines our therapeutic relationship with you and may result in us being unable to continue to provide your care.    Refills: A provider must see you at least annually to prescribe and refill medications. This is to ensure your safety as well as meet insurance and compliance regulations.    Scheduling: Many of our Providers offer both in-person or video visits. Please call to schedule any needed follow ups as soon as possible because our provider schedules fill up very quickly. Our care team has the right to require an in-person visit when they believe that it is medically necessary. Please  remember that for any virtual visits, you must be in the state [facility] at the time of the visit, otherwise we are unable to see you and you will need to be rescheduled.    Missed Appointments: If you need to cancel or miss your scheduled appointment, please call the clinic at [phone] to reschedule.  Please note if you repeatedly miss appointments or repeatedly miss appointments without calling to inform us ahead of time (no-show), the clinic may elect to not allow you to reschedule without speaking to a manager, may require a Partnership In Care Agreement prior to rescheduling, or could result in you no longer being able to receive care from the clinic. Providing the clinic with timely notification if you have to miss an appointment, allows us to better serve the needs of all of our patients.    Primary Care Provider: Our Endocrinologists are Specialists in their field. We expect you to have a Primary Care Provider established to handle any needs outside of your diabetes and endocrine care.  We would be happy to assist you find a Primary Care Provider, if you do not have one.    [employer]: [employer] is a wonderful resource that allows you access to your Care Team via online or the heaven. Please ask a member of the team if you would like help creating an account. Please note that it may take up to 2 business days for a response. [employer] messages are not reviewed on weekends or after business hours.  Emergent or urgent care needs should never be communicated via [employer].  If you experience a medical emergency call 911 or go to the nearest emergency room.    Labs: It is recommended that you stay within the [facility] for labs but you are welcome to obtain ordered labs (with some exceptions) from any location of your choice as long as they are able to complete and process the needed labs. If you need us to fax orders to your preferred lab, please provide us the name and fax number of the lab you would  like to go to so we can fax the orders. If your labs are drawn outside of the [facility], please have them fax the results to [phone] ([place]) or [phone] ([place]) or via [facility]. It is your responsibility to ensure that outside lab results are sent to us.    We look forward to working with you. Please do not hesitate to reach out with any questions.    Thank you,    The Endocrine Team    [facility] Address:   [place] Address:     [address] [place] [address]    Phone: [phone]  Fax: [phone]   [address] [place] [address]    Phone: [phone]  Fax: [phone]     [facility] Cost Estimate Phone Number: [phone]    General Lab and Imaging Scheduling Phone Number: [phone]

## 2025-01-20 ENCOUNTER — APPOINTMENT (OUTPATIENT)
Dept: URBAN - METROPOLITAN AREA CLINIC 252 | Age: 65
Setting detail: DERMATOLOGY
End: 2025-01-21

## 2025-01-20 VITALS — WEIGHT: 190 LBS | HEIGHT: 70 IN

## 2025-01-20 DIAGNOSIS — Z41.9 ENCOUNTER FOR PROCEDURE FOR PURPOSES OTHER THAN REMEDYING HEALTH STATE, UNSPECIFIED: ICD-10-CM

## 2025-01-20 PROCEDURE — OTHER ADDITIONAL NOTES: OTHER

## 2025-01-20 PROCEDURE — OTHER BOTOX: OTHER

## 2025-01-20 NOTE — PROCEDURE: BOTOX
Total Units: 0
Price (Use Numbers Only, No Special Characters Or $): 002
Expiration Date (Month Year): 2025/09
Show Inventory Tab: Show
Detail Level: Detailed
Map Statement: Please see attached map for locations and injection amounts.
Consent: COLOR KEY FOR DIAGRAM BELOW:\\nRed = 1/2 unit of Jeuveau per injection\\nOrange = 1 units of Jeuveau per injection\\nYellow = 2 units of Jeuveau per injection\\nGreen = 3 units of Jeuveau per injection\\nBlue = 4 units of Jeuveau per injection\\nBrown = 5 units of Jeuveau per injection\\n\\nWritten consent obtained. Risks include but not limited to lid/brow ptosis, bruising, swelling, diplopia, temporary effect, incomplete chemical denervation.
Post-Care Instructions: Patient instructed to not lie down for 4 hours and limit physical activity for 24 hours.
Lot #: [id number]

## 2025-01-20 NOTE — PROCEDURE: ADDITIONAL NOTES
Detail Level: Simple
Additional Notes: Topical numbing cream applied at site of Botox injections.
Render Risk Assessment In Note?: no

## 2025-01-29 NOTE — TELEPHONE ENCOUNTER
FUTURE VISIT INFORMATION      FUTURE VISIT INFORMATION:  Date: 02/27/25  Time: 10:50 AM  Location: [facility]    REFERRAL INFORMATION:  Referring Provider: Nura Wang MD   Referring Provider Clinic: [facility]  Reason for visit/diagnosis: Hyperparathyroidism     RECORDS REQUESTED FROM:       Clinic Name Date of Service/Test Imaging Status Imaging Report Status   [facility]  12/23/24 - NM Parathyroid Planar w/Spect & CT Dual  Epic Epic   [facility]  08/18/23 - DX Wrist Heel Radius & DX Hip/Pelvis/Spine  Epic [facility]     Patricia Lee CMA

## 2025-02-13 ENCOUNTER — TELEPHONE (OUTPATIENT)
Dept: SURGERY | Facility: CLINIC | Age: 65
End: 2025-02-13
Payer: MEDICAID

## 2025-02-13 NOTE — TELEPHONE ENCOUNTER
[facility] Call Center    Phone Message    May a detailed message be left on voicemail: yes     Reason for Call: Other: I accidentally cancelled this appt. Pleas reinstate it Thank you     Action Taken: Message routed to:  [facility] ENT    Travel Screening: Not Applicable     Date of Service:

## 2025-02-13 NOTE — TELEPHONE ENCOUNTER
Per chart review, patient had scheduled appointment with Dr. Abrams on 02/27/25 at 10:50 AM. Appointment slot currently available. Appointment scheduled.   Patricia Lee CMA

## 2025-02-25 ENCOUNTER — TRANSFERRED RECORDS (OUTPATIENT)
Dept: HEALTH INFORMATION MANAGEMENT | Facility: CLINIC | Age: 65
End: 2025-02-25
Payer: MEDICAID

## 2025-02-27 ENCOUNTER — PRE VISIT (OUTPATIENT)
Dept: SURGERY | Facility: CLINIC | Age: 65
End: 2025-02-27

## 2025-02-27 ENCOUNTER — MYC MEDICAL ADVICE (OUTPATIENT)
Dept: SURGERY | Facility: CLINIC | Age: 65
End: 2025-02-27

## 2025-03-03 ENCOUNTER — TELEPHONE (OUTPATIENT)
Dept: SURGERY | Facility: CLINIC | Age: 65
End: 2025-03-03
Payer: MEDICAID

## 2025-03-03 NOTE — TELEPHONE ENCOUNTER
I called patient in regards to surgery with Dr. Alisha Abrams. I was unable to reach patient, but a voicemail and a call back number were left on patients voicemail.    Darrius Crow on 3/3/2025 at 3:48 PM

## 2025-03-03 NOTE — TELEPHONE ENCOUNTER
Reason for Call:  Other call back    Detailed comments: Patient is calling to schedule surgery. Please call to advise Thank you     Phone Number Patient can be reached at: Home number on file [phone] (home)    Best Time: any    Can we leave a detailed message on this number? YES    Call taken on 3/3/2025 at 10:21 AM by Liliam Oneal

## 2025-03-03 NOTE — TELEPHONE ENCOUNTER
Patient called wanting to schedule surgery. I let patient know I would take a look at the schedule and give her a call back.     Darrius Crow on 3/3/2025 at 3:48 PM\

## 2025-03-03 NOTE — TELEPHONE ENCOUNTER
[facility] Call Center    Phone Message    May a detailed message be left on voicemail: yes     Reason for Call: Other: Pt returning Katz's call, requesting call back in regards to scheduling a surgery.      Action Taken: Other: [facility] proc coord    Travel Screening: Not Applicable     Date of Service:

## 2025-03-03 NOTE — TELEPHONE ENCOUNTER
Patient is calling to schedule surgery with Dr. Abrams. I let patient know I would reach out to care team.      Darrius Crow on 3/3/2025 at 9:43 AM

## 2025-03-04 ENCOUNTER — PREP FOR PROCEDURE (OUTPATIENT)
Dept: OTOLARYNGOLOGY | Facility: CLINIC | Age: 65
End: 2025-03-04
Payer: MEDICAID

## 2025-03-04 ENCOUNTER — NURSE TRIAGE (OUTPATIENT)
Dept: FAMILY MEDICINE | Facility: CLINIC | Age: 65
End: 2025-03-04
Payer: MEDICAID

## 2025-03-04 PROBLEM — E21.3 HYPERPARATHYROIDISM: Status: ACTIVE | Noted: 2025-02-28

## 2025-03-04 NOTE — TELEPHONE ENCOUNTER
Spoke with patient. Relayed provider's message as written. Patient verbalized understanding and has no further questions at this time.    MITUL CameronN RN  [employer]

## 2025-03-04 NOTE — TELEPHONE ENCOUNTER
3/4 Called and spoke to patient, appointment is currently scheduled.     [facility]   Orthopedics, Podiatry, Sports Medicine, Ent ,Eye , Audiology, Adult Endocrine & Diabetes, Nutrition & Medication Therapy Management Specialties   [facility]

## 2025-03-04 NOTE — TELEPHONE ENCOUNTER
I believe this got sent to the wrong pool?     Thanks  [facility]   Orthopedics, Podiatry, Sports Medicine, Ent ,Eye , Audiology, Adult Endocrine & Diabetes, Nutrition & Medication Therapy Management Specialties   [facility]

## 2025-03-04 NOTE — TELEPHONE ENCOUNTER
Nurse Triage SBAR    Is this a 2nd Level Triage? YES, LICENSED PRACTITIONER REVIEW IS REQUIRED    Situation: Patient reporting bilateral flank pain that radiates into low back. Patient states low back pain is 80% into L side and flank pain is mostly on L side but some R     Background: Patient states in November had lab work done for parathyroid with [facility] and states creatinine came back a little elevated but was told it was ok by provider.     Assessment: Patient calling to report bilateral flank pain and low back pain that started approx 1-2 weeks ago. States pain both flank and low back is mainly into L side but also present on R side, also radiates into hips.     Currently rating 8/10, states pain is constant but severity varies. Patient denies n/v, pain with urination, blood in urine, fever, abdominal pain, weakness.     Protocol Recommended Disposition:   Go to ED Now    Recommendation: RN recommended ER. Patient declined and stated she does not feel she needs to go to ED currently but if sx worsen or feels she needs better pain control would go today.     Patient wants to be seen in clinic and states she is ok waiting to be seen tomorrow in clinic as RN stated no available appointments today.     Ok for patient to wait and be scheduled to be seen tomorrow in clinic?     Patient again stated at end of call if anything worsens she will go to ED but for now would like to be seen in clinic.      Routed to provider    Does the patient meet one of the following criteria for ADS visit consideration? 16+ years old, with an [facility] PCP     TIP  Providers, please consider if this condition is appropriate for management at one of our Acute and Diagnostic Services sites.     If patient is a good candidate, please use dotphrase <dot>triageresponse and select Refer to ADS to document.         Reason for Disposition   SEVERE pain (e.g., excruciating, scale 8-10) and present > 1 hour    Additional Information   Negative:  "Passed out (i.e., lost consciousness, collapsed and was not responding)   Negative: Shock suspected (e.g., cold/pale/clammy skin, too weak to stand, low BP, rapid pulse)   Negative: Sounds like a life-threatening emergency to the triager   Negative: Followed a major injury to the back (e.g., MVA, fall > 10 feet or 3 meters, penetrating injury, etc.)   Negative: Upper, mid or lower back pain that occurs mainly in the midline    Answer Assessment - Initial Assessment Questions  1. LOCATION: \"Where does it hurt?\" (e.g., left, right)      Mainly on L side but some on R   2. ONSET: \"When did the pain start?\"      1-2 weeks ago   3. SEVERITY: \"How bad is the pain?\" (e.g., Scale 1-10; mild, moderate, or severe)    - MILD (1-3): doesn't interfere with normal activities     - MODERATE (4-7): interferes with normal activities or awakens from sleep     - SEVERE (8-10): excruciating pain and patient unable to do normal activities (stays in bed)        8/10  4. PATTERN: \"Does the pain come and go, or is it constant?\"       Constant but severity varies may not be as painful all the time   5. CAUSE: \"What do you think is causing the pain?\"      Unsure, has had some abnormal kidney labs lately creatine when checking for parathyroid   6. OTHER SYMPTOMS:  \"Do you have any other symptoms?\" (e.g., fever, abdomen pain, vomiting, leg weakness, burning with urination, blood in urine)      May get hotflashes but can be from menopause, low back pain on L side 80% on L side denies other sx   7. PREGNANCY:  \"Is there any chance you are pregnant?\" \"When was your last menstrual period?\"      NO    Protocols used: Flank Pain-A-OH    "

## 2025-03-04 NOTE — TELEPHONE ENCOUNTER
Okay to schedule for clinic visit for tomorrow    To emergency room if worsens    Please inform patient    Jacques Mills MD

## 2025-03-04 NOTE — TELEPHONE ENCOUNTER
Scheduled surgery with Dr. Alisha Abrams  on 4/23/2025    Spoke with: Mary (patient)    Surgery is located at [facility]    Patient will be seen for their H&P by their PCP Dr. Mills within 30 days of surgery - Confirmed PCP on file is up to date     Does patient need a consult before upcoming surgery? No    Anesthesia type: General    Requested Imaging required for surgery: No    Patient needs scheduled for their 2 week post op    Patient will receive their surgery packet & surgical soap via mail per their preference - Confirmed address on file is up to date    Patient was not provided a start time for surgery & is aware they will receive this information 2-3 days before surgery    Additional comments:      Patient was instructed to call back with any further questions or concerns.     Darrius Crow on 3/4/2025 at 9:06 AM

## 2025-03-05 ENCOUNTER — OFFICE VISIT (OUTPATIENT)
Dept: FAMILY MEDICINE | Facility: CLINIC | Age: 65
End: 2025-03-05
Payer: MEDICAID

## 2025-03-05 VITALS
HEIGHT: 70 IN | WEIGHT: 193 LBS | RESPIRATION RATE: 18 BRPM | BODY MASS INDEX: 27.63 KG/M2 | DIASTOLIC BLOOD PRESSURE: 88 MMHG | SYSTOLIC BLOOD PRESSURE: 119 MMHG | TEMPERATURE: 97.9 F | OXYGEN SATURATION: 95 % | HEART RATE: 70 BPM

## 2025-03-05 DIAGNOSIS — M54.9 BACK PAIN, UNSPECIFIED BACK LOCATION, UNSPECIFIED BACK PAIN LATERALITY, UNSPECIFIED CHRONICITY: Primary | ICD-10-CM

## 2025-03-05 DIAGNOSIS — E21.3 HYPERPARATHYROIDISM: ICD-10-CM

## 2025-03-05 LAB
ALBUMIN UR-MCNC: NEGATIVE MG/DL
APPEARANCE UR: CLEAR
BILIRUB UR QL STRIP: NEGATIVE
COLOR UR AUTO: YELLOW
GLUCOSE UR STRIP-MCNC: NEGATIVE MG/DL
HGB UR QL STRIP: NEGATIVE
KETONES UR STRIP-MCNC: NEGATIVE MG/DL
LEUKOCYTE ESTERASE UR QL STRIP: ABNORMAL
NITRATE UR QL: NEGATIVE
PH UR STRIP: 5.5 [PH] (ref 5–7)
RBC #/AREA URNS AUTO: ABNORMAL /HPF
SP GR UR STRIP: 1.01 (ref 1–1.03)
SQUAMOUS #/AREA URNS AUTO: ABNORMAL /LPF
UROBILINOGEN UR STRIP-ACNC: 0.2 E.U./DL
WBC #/AREA URNS AUTO: ABNORMAL /HPF

## 2025-03-05 PROCEDURE — 99213 OFFICE O/P EST LOW 20 MIN: CPT | Performed by: FAMILY MEDICINE

## 2025-03-05 PROCEDURE — 81001 URINALYSIS AUTO W/SCOPE: CPT | Performed by: FAMILY MEDICINE

## 2025-03-05 PROCEDURE — 3074F SYST BP LT 130 MM HG: CPT | Performed by: FAMILY MEDICINE

## 2025-03-05 PROCEDURE — 3079F DIAST BP 80-89 MM HG: CPT | Performed by: FAMILY MEDICINE

## 2025-03-05 PROCEDURE — 1125F AMNT PAIN NOTED PAIN PRSNT: CPT | Performed by: FAMILY MEDICINE

## 2025-03-05 ASSESSMENT — PAIN SCALES - GENERAL: PAINLEVEL_OUTOF10: MODERATE PAIN (5)

## 2025-03-05 NOTE — PROGRESS NOTES
"  Assessment & Plan     (M54.9) Back pain, unspecified back location, unspecified back pain laterality, unspecified chronicity  (primary encounter diagnosis)  Comment: UA reassuring.  No hematuria.  Follow up prn symptoms   Plan: UA with Microscopic reflex to Culture - Clinic         Collect, UA Microscopic with Reflex to Culture             (E21.3) Hyperparathyroidism  Comment: patient to have procedure in April  Plan: see us prior for preop      Be seen promptly if symptoms acutely worsen           BMI  Estimated body mass index is 27.69 kg/m  as calculated from the following:    Height as of this encounter: 1.778 m (5' 10\").    Weight as of this encounter: 87.5 kg (193 lb).             Laura Hernandez is a 64 year old, presenting for the following health issues:  Flank Pain (For the past 2 weeks)        3/5/2025     1:34 PM   Additional Questions   Roomed by Tayla Nunn     History of Present Illness       Back Pain:  She presents for follow up of back pain. Patient's back pain is a recurring problem.  Location of back pain:  Right lower back, left lower back, left buttock, left hip, right side of waist and left side of waist  Description of back pain: dull ache and sharp  Back pain spreads: left buttocks    Since patient first noticed back pain, pain is: always present, but gets better and worse  Does back pain interfere with her job:  Not applicable      She is taking medications regularly.       This started 2-3 weeks ago   For years kidneys bothered per patient    Got acupuncture    Patient to have parathyroid procedure in April    No history of kidney stones    Only a couple uti in past    Occasional back pain in past     Pain better today     Patient in touch with her body she states    No  pain or burning with urination    No fever/ chills    Not working currently     Was sitting  a lot    Not much history of back pain    In yoga     No leg pain    More pain on left          Objective    BP " "119/88 (BP Location: Left arm, Patient Position: Chair, Cuff Size: Adult Regular)   Pulse 70   Temp 97.9  F (36.6  C) (Temporal)   Resp 18   Ht 1.778 m (5' 10\")   Wt 87.5 kg (193 lb)   SpO2 95%   BMI 27.69 kg/m    Body mass index is 27.69 kg/m .  Physical Exam    Full physical not done     Mentation and affect are fine    No tremor of speech or extremity    Patient points to mid back bilaterally when asked where pain is    No distinct point tenderness on back, flank, spine     Range of motion of back is very good    Patient denies any leg symptoms at all     Ua done , normal        Signed Electronically by: Jacques Mills MD    "

## 2025-03-05 NOTE — PATIENT INSTRUCTIONS
Stay well hydrated    Follow up as needed based on symptoms    Be seen promptly if symptoms acutely worsen

## 2025-03-12 ENCOUNTER — APPOINTMENT (OUTPATIENT)
Dept: URBAN - METROPOLITAN AREA CLINIC 252 | Age: 65
Setting detail: DERMATOLOGY
End: 2025-03-12

## 2025-03-12 DIAGNOSIS — Z41.9 ENCOUNTER FOR PROCEDURE FOR PURPOSES OTHER THAN REMEDYING HEALTH STATE, UNSPECIFIED: ICD-10-CM

## 2025-03-12 PROCEDURE — OTHER OTHER: OTHER

## 2025-03-12 PROCEDURE — OTHER JEUVEAU: OTHER

## 2025-03-12 NOTE — PROCEDURE: OTHER
Note Text (......Xxx Chief Complaint.): This diagnosis correlates with the
Render Risk Assessment In Note?: no
Other (Free Text): Recommended to treat glabella and forehead but patient deferred because of the cost \\nDiscussed in detail that Botox and jeuvue last 3-4 months , she hasn’t been coming in regularly for neurotoxin appointments and this will also help with the rhytids
Detail Level: Zone

## 2025-03-12 NOTE — PROCEDURE: JEUVEAU
Show Inventory Tab: Show
Consent: - Verbal and written consent obtained prior to the procedure today.\\n- Discussed the risks that include but not limited to lid/brow ptosis, bruising, headache, asymmetry, swelling,\\ndiplopia, brow drop, temporary effect, and incomplete chemical denervation.\\n- Some locations on the lower face if treated with Jeuveau have the potential to affect the appearance of a smile.\\n- Advised that it can take up to 14 days for the full effect of the Jeuveau to take place.\\n- Generally the effects of Jeuveau are expected to last for 3-4 months, but length of duration can vary. - Skin was\\ncleansed with 70% isopropyl alcohol prior to injection today.\\n- Patient was instructed to not lie down, press on the area, or partake in strenuous physical activity for 6 hrs.
Expiration Date (Month Year): 2025
Total Units: 9
Price (Use Numbers Only, No Special Characters Or $): 0
Detail Level: Detailed
Post-Care Instructions: COLOR KEY FOR DIAGRAM BELOW:\\nRed = 1/2 unit of Jeuveau per injection\\nOrange = 1 units of Jeuveau per injection\\nYellow = 2 units of Jeuveau per injection\\nGreen = 3 units of Jeuveau per injection\\nBlue = 4 units of Jeuveau per injection\\nBrown = 5 units of Jeuveau per injection
Lot #: [id number]
Map Statement: Please see attached map for locations and injection amounts.

## 2025-03-24 ENCOUNTER — OFFICE VISIT (OUTPATIENT)
Dept: FAMILY MEDICINE | Facility: CLINIC | Age: 65
End: 2025-03-24
Payer: MEDICAID

## 2025-03-24 VITALS
TEMPERATURE: 97.9 F | RESPIRATION RATE: 16 BRPM | WEIGHT: 191 LBS | HEIGHT: 70 IN | SYSTOLIC BLOOD PRESSURE: 133 MMHG | OXYGEN SATURATION: 95 % | BODY MASS INDEX: 27.35 KG/M2 | DIASTOLIC BLOOD PRESSURE: 85 MMHG | HEART RATE: 68 BPM

## 2025-03-24 DIAGNOSIS — Z13.1 SCREENING FOR DIABETES MELLITUS: ICD-10-CM

## 2025-03-24 DIAGNOSIS — Z01.818 PREOP GENERAL PHYSICAL EXAM: Primary | ICD-10-CM

## 2025-03-24 DIAGNOSIS — E21.3 HYPERPARATHYROIDISM: ICD-10-CM

## 2025-03-24 LAB
ANION GAP SERPL CALCULATED.3IONS-SCNC: 8 MMOL/L (ref 7–15)
BASOPHILS # BLD AUTO: 0.1 10E3/UL (ref 0–0.2)
BASOPHILS NFR BLD AUTO: 1 %
BUN SERPL-MCNC: 13.6 MG/DL (ref 8–23)
CALCIUM SERPL-MCNC: 11.1 MG/DL (ref 8.8–10.4)
CHLORIDE SERPL-SCNC: 107 MMOL/L (ref 98–107)
CREAT SERPL-MCNC: 0.72 MG/DL (ref 0.51–0.95)
EGFRCR SERPLBLD CKD-EPI 2021: >90 ML/MIN/1.73M2
EOSINOPHIL # BLD AUTO: 0.2 10E3/UL (ref 0–0.7)
EOSINOPHIL NFR BLD AUTO: 2 %
ERYTHROCYTE [DISTWIDTH] IN BLOOD BY AUTOMATED COUNT: 13.9 % (ref 10–15)
EST. AVERAGE GLUCOSE BLD GHB EST-MCNC: 111 MG/DL
GLUCOSE SERPL-MCNC: 101 MG/DL (ref 70–99)
HBA1C MFR BLD: 5.5 % (ref 0–5.6)
HCO3 SERPL-SCNC: 26 MMOL/L (ref 22–29)
HCT VFR BLD AUTO: 45.5 % (ref 35–47)
HGB BLD-MCNC: 14.3 G/DL (ref 11.7–15.7)
IMM GRANULOCYTES # BLD: 0 10E3/UL
IMM GRANULOCYTES NFR BLD: 0 %
LYMPHOCYTES # BLD AUTO: 1.7 10E3/UL (ref 0.8–5.3)
LYMPHOCYTES NFR BLD AUTO: 27 %
MCH RBC QN AUTO: 29.4 PG (ref 26.5–33)
MCHC RBC AUTO-ENTMCNC: 31.4 G/DL (ref 31.5–36.5)
MCV RBC AUTO: 94 FL (ref 78–100)
MONOCYTES # BLD AUTO: 0.3 10E3/UL (ref 0–1.3)
MONOCYTES NFR BLD AUTO: 5 %
NEUTROPHILS # BLD AUTO: 4.1 10E3/UL (ref 1.6–8.3)
NEUTROPHILS NFR BLD AUTO: 64 %
PLATELET # BLD AUTO: 245 10E3/UL (ref 150–450)
POTASSIUM SERPL-SCNC: 4.9 MMOL/L (ref 3.4–5.3)
RBC # BLD AUTO: 4.86 10E6/UL (ref 3.8–5.2)
SODIUM SERPL-SCNC: 141 MMOL/L (ref 135–145)
WBC # BLD AUTO: 6.4 10E3/UL (ref 4–11)

## 2025-03-24 PROCEDURE — 1126F AMNT PAIN NOTED NONE PRSNT: CPT | Performed by: FAMILY MEDICINE

## 2025-03-24 PROCEDURE — 3079F DIAST BP 80-89 MM HG: CPT | Performed by: FAMILY MEDICINE

## 2025-03-24 PROCEDURE — 83036 HEMOGLOBIN GLYCOSYLATED A1C: CPT | Performed by: FAMILY MEDICINE

## 2025-03-24 PROCEDURE — 80048 BASIC METABOLIC PNL TOTAL CA: CPT | Performed by: FAMILY MEDICINE

## 2025-03-24 PROCEDURE — 3075F SYST BP GE 130 - 139MM HG: CPT | Performed by: FAMILY MEDICINE

## 2025-03-24 PROCEDURE — 99214 OFFICE O/P EST MOD 30 MIN: CPT | Performed by: FAMILY MEDICINE

## 2025-03-24 PROCEDURE — 36415 COLL VENOUS BLD VENIPUNCTURE: CPT | Performed by: FAMILY MEDICINE

## 2025-03-24 PROCEDURE — 85025 COMPLETE CBC W/AUTO DIFF WBC: CPT | Performed by: FAMILY MEDICINE

## 2025-03-24 ASSESSMENT — PAIN SCALES - GENERAL: PAINLEVEL_OUTOF10: NO PAIN (0)

## 2025-03-24 NOTE — PROGRESS NOTES
Preoperative Evaluation  [facility]  [address]  [place] [address]  Phone: [phone]  Primary Provider: Jacques Mills MD  Pre-op Performing Provider: Jacques Mills MD  Mar 24, 2025              3/19/2025   Surgical Information   What procedure is being done? Preop   Facility or Hospital where procedure/surgery will be performed: Surgery center on [facility]   Who is doing the procedure / surgery? Alisha Abrams   Date of surgery / procedure: 4/1/25   Time of surgery / procedure: Unknown   Where do you plan to recover after surgery? at home with family     Fax number for surgical facility: Note does not need to be faxed, will be available electronically in Epic.    Assessment & Plan     The proposed surgical procedure is considered INTERMEDIATE risk.    (Z01.818) Preop general physical exam  (primary encounter diagnosis)  Comment: patient in stable  health.  Should be okay for procedure.  Labs pending.   Plan: Basic metabolic panel, CBC with Platelets &         Differential             (E21.3) Hyperparathyroidism  Comment: to have procedure for this   Plan: as above    (Z13.1) Screening for diabetes mellitus  Comment: last year at high end of normal range, not quite prediabetes   Plan: Hemoglobin A1c        Recheck today     Patient not on any blood thinners    Possible Sleep Apnea: patient not sure about this         No data to display                    - No identified additional risk factors other than previously addressed         Recommendation  Approval given to proceed with proposed procedure, without further diagnostic evaluation.    Laura Hernandez is a 65 year old, presenting for the following:  Pre-Op Exam          3/24/2025     8:56 AM   Additional Questions   Roomed by Tayla Nunn     HPI: 65 year old female with hyperparathyroidism.  To have  procedure April 1 at [facility].          3/19/2025   Pre-Op Questionnaire   Have you ever had a heart attack  or stroke? No   Have you ever had surgery on your heart or blood vessels, such as a stent placement, a coronary artery bypass, or surgery on an artery in your head, neck, heart, or legs? No   Do you have chest pain with activity? No   Do you have a history of heart failure? No   Do you currently have a cold, bronchitis or symptoms of other infection? No   Do you have a cough, shortness of breath, or wheezing? No   Do you or anyone in your family have previous history of blood clots? No   Do you or does anyone in your family have a serious bleeding problem such as prolonged bleeding following surgeries or cuts? No   Have you ever had problems with anemia or been told to take iron pills? No   Have you had any abnormal blood loss such as black, tarry or bloody stools, or abnormal vaginal bleeding? No   Have you ever had a blood transfusion? No   Are you willing to have a blood transfusion if it is medically needed before, during, or after your surgery? Yes   Have you or any of your relatives ever had problems with anesthesia? No   Do you have sleep apnea, excessive snoring or daytime drowsiness? (!) UNKNOWN   Do you have any artifical heart valves or other implanted medical devices like a pacemaker, defibrillator, or continuous glucose monitor? No   Do you have artificial joints? No   Are you allergic to latex? No     Health Care Directive  Patient does not have a Health Care Directive    Preoperative Review of   Patient has zolpidem on med list but only used rarely           Patient Active Problem List    Diagnosis Date Noted    Hyperparathyroidism 02/28/2025     Priority: Medium    RBBB (right bundle branch block with left anterior fascicular block) 12/27/2019     Priority: Medium    Atrial enlargement, left 12/27/2019     Priority: Medium    Raynaud's syndrome 12/11/2018     Priority: Medium    Herpes simplex virus infection 12/11/2018     Priority: Medium      No past medical history on file.  Past Surgical  "History:   Procedure Laterality Date    COLONOSCOPY WITH CO2 INSUFFLATION N/A 12/7/2023    Procedure: Colonoscopy with CO2 insufflation;  Surgeon: Keara Belle DO;  Location: [facility] OR    ENT SURGERY      mouth    LASIK      monovision - left is near eye    SOFT TISSUE SURGERY      remove needle from foot     Current Outpatient Medications   Medication Sig Dispense Refill    acyclovir (ZOVIRAX) 400 MG tablet TAKE 1 TABLET BY MOUTH EVERY DAY MAY TAKE 1 TAB TWICE DAILY FOR 5 DAYS FOR BREAK OUTS. 120 tablet 3    cetirizine (ZYRTEC) 10 MG tablet Take 1 tablet (10 mg) by mouth daily. Needs to be seen for further refills 90 tablet 3    RETIN-A 0.1 % external cream Apply topically at bedtime.      zolpidem (AMBIEN) 5 MG tablet Take 1 tablet (5 mg) by mouth nightly as needed for sleep. 10 tablet 0    guaiFENesin-codeine (ROBITUSSIN AC) 100-10 MG/5ML solution Take 5-10 mLs by mouth every 6 hours as needed for cough. 118 mL 0   The cetirizine  is just as needed  Not on cough medicine anymore    Allergies   Allergen Reactions    Levaquin [Levofloxacin] Other (See Comments)     tendonitis    Penicillins Rash        Social History     Tobacco Use    Smoking status: Former     Passive exposure: Never    Smokeless tobacco: Never   Substance Use Topics    Alcohol use: Yes       History   Drug Use No             Review of Systems  Constitutional, HEENT, cardiovascular, pulmonary, GI, , musculoskeletal, neuro, skin, endocrine and psych systems are negative, except as otherwise noted.    No recent illnesses     No history of anesthesia problems    No bleeding or clotting disorders    No cardiac history     No chest pain/ breathing problems        Objective    /85 (BP Location: Left arm, Patient Position: Chair, Cuff Size: Adult Regular)   Pulse 68   Temp 97.9  F (36.6  C) (Temporal)   Resp 16   Ht 1.778 m (5' 10\")   Wt 86.6 kg (191 lb)   SpO2 95%   BMI 27.41 kg/m     Estimated body mass index is 27.41 kg/m  as " "calculated from the following:    Height as of this encounter: 1.778 m (5' 10\").    Weight as of this encounter: 86.6 kg (191 lb).  Physical Exam  GENERAL: alert and no distress  EYES: Eyes grossly normal to inspection, PERRL and conjunctivae and sclerae normal  HENT: ear canals and TM's normal, nose and mouth without ulcers or lesions  NECK: no adenopathy, no asymmetry, masses, or scars  RESP: lungs clear to auscultation - no rales, rhonchi or wheezes  CV: regular rate and rhythm, normal S1 S2, no S3 or S4, no murmur, click or rub, no peripheral edema  ABDOMEN: soft, nontender, no hepatosplenomegaly, no masses and bowel sounds normal  MS: no gross musculoskeletal defects noted, no edema  SKIN: no suspicious lesions or rashes  NEURO: Normal strength and tone, mentation intact and speech normal  PSYCH: mentation appears normal, affect normal/bright    Recent Labs   Lab Test 11/13/24  1530      POTASSIUM 4.5   CR 0.84        Diagnostics     No cardiac history/ symptoms so no EKG needed     Labs pending      Revised Cardiac Risk Index (RCRI)  The patient has the following serious cardiovascular risks for perioperative complications:   - No serious cardiac risks = 0 points     RCRI Interpretation: 0 points: Class I (very low risk - 0.4% complication rate)         Signed Electronically by: Jacques Mills MD  A copy of this evaluation report is provided to the requesting physician.          "

## 2025-03-24 NOTE — PATIENT INSTRUCTIONS
Avoid aspirin/ ibuprofen/ naproxen type meds the week prior    Tylenol  ( acetaminophen ) okay to take the  week prior    Follow directions from surgery center about not eating/ drinking prior    We will send you lab results   Fleet  Enema administered.  Pt passed some hard stool balls and liquid.   Alert and oriented to person, place and time

## 2025-03-25 NOTE — RESULT ENCOUNTER NOTE
Your hemoglobin is normal, so no anemia.  Don't worry about the very slightly low mchc.    The best test to check for diabetes or prediabetes is the hemoglobin a1c.  This is normal.  You do not have diabetes or prediabetes.    Okay for procedure.    Jacques Mills MD

## 2025-04-14 ENCOUNTER — ANESTHESIA EVENT (OUTPATIENT)
Dept: SURGERY | Facility: AMBULATORY SURGERY CENTER | Age: 65
End: 2025-04-14
Payer: COMMERCIAL

## 2025-04-15 ENCOUNTER — ANESTHESIA (OUTPATIENT)
Dept: SURGERY | Facility: AMBULATORY SURGERY CENTER | Age: 65
End: 2025-04-15
Payer: COMMERCIAL

## 2025-04-15 ENCOUNTER — HOSPITAL ENCOUNTER (OUTPATIENT)
Facility: AMBULATORY SURGERY CENTER | Age: 65
Discharge: HOME | End: 2025-04-15
Attending: SURGERY
Payer: COMMERCIAL

## 2025-04-15 VITALS
DIASTOLIC BLOOD PRESSURE: 74 MMHG | TEMPERATURE: 98 F | WEIGHT: 191 LBS | HEIGHT: 70 IN | HEART RATE: 56 BPM | RESPIRATION RATE: 16 BRPM | BODY MASS INDEX: 27.35 KG/M2 | SYSTOLIC BLOOD PRESSURE: 129 MMHG | OXYGEN SATURATION: 93 %

## 2025-04-15 DIAGNOSIS — Z98.890 STATUS POST SURGERY: Primary | ICD-10-CM

## 2025-04-15 LAB
PTH-INTACT SERPL-MCNC: 123 PG/ML (ref 15–65)
PTH-INTACT SERPL-MCNC: 16 PG/ML (ref 15–65)
PTH-INTACT SERPL-MCNC: 26 PG/ML (ref 15–65)

## 2025-04-15 PROCEDURE — 88305 TISSUE EXAM BY PATHOLOGIST: CPT | Mod: 26 | Performed by: STUDENT IN AN ORGANIZED HEALTH CARE EDUCATION/TRAINING PROGRAM

## 2025-04-15 PROCEDURE — 88331 PATH CONSLTJ SURG 1 BLK 1SPC: CPT | Mod: 26 | Performed by: STUDENT IN AN ORGANIZED HEALTH CARE EDUCATION/TRAINING PROGRAM

## 2025-04-15 PROCEDURE — 83970 ASSAY OF PARATHORMONE: CPT | Performed by: PATHOLOGY

## 2025-04-15 PROCEDURE — 88331 PATH CONSLTJ SURG 1 BLK 1SPC: CPT | Mod: TC | Performed by: SURGERY

## 2025-04-15 RX ORDER — NALOXONE HYDROCHLORIDE 0.4 MG/ML
0.1 INJECTION, SOLUTION INTRAMUSCULAR; INTRAVENOUS; SUBCUTANEOUS
Status: DISCONTINUED | OUTPATIENT
Start: 2025-04-15 | End: 2025-04-15 | Stop reason: HOSPADM

## 2025-04-15 RX ORDER — ONDANSETRON 4 MG/1
4 TABLET, ORALLY DISINTEGRATING ORAL EVERY 30 MIN PRN
Status: DISCONTINUED | OUTPATIENT
Start: 2025-04-15 | End: 2025-04-16 | Stop reason: HOSPADM

## 2025-04-15 RX ORDER — ONDANSETRON 2 MG/ML
4 INJECTION INTRAMUSCULAR; INTRAVENOUS EVERY 30 MIN PRN
Status: DISCONTINUED | OUTPATIENT
Start: 2025-04-15 | End: 2025-04-15 | Stop reason: HOSPADM

## 2025-04-15 RX ORDER — DEXAMETHASONE SODIUM PHOSPHATE 10 MG/ML
4 INJECTION, SOLUTION INTRAMUSCULAR; INTRAVENOUS
Status: DISCONTINUED | OUTPATIENT
Start: 2025-04-15 | End: 2025-04-15 | Stop reason: HOSPADM

## 2025-04-15 RX ORDER — OXYCODONE HYDROCHLORIDE 5 MG/1
10 TABLET ORAL
Status: DISCONTINUED | OUTPATIENT
Start: 2025-04-15 | End: 2025-04-16 | Stop reason: HOSPADM

## 2025-04-15 RX ORDER — HYDROMORPHONE HYDROCHLORIDE 1 MG/ML
0.4 INJECTION, SOLUTION INTRAMUSCULAR; INTRAVENOUS; SUBCUTANEOUS EVERY 5 MIN PRN
Status: DISCONTINUED | OUTPATIENT
Start: 2025-04-15 | End: 2025-04-15 | Stop reason: HOSPADM

## 2025-04-15 RX ORDER — PROPOFOL 10 MG/ML
INJECTION, EMULSION INTRAVENOUS PRN
Status: DISCONTINUED | OUTPATIENT
Start: 2025-04-15 | End: 2025-04-15

## 2025-04-15 RX ORDER — ACETAMINOPHEN 325 MG/1
975 TABLET ORAL ONCE
Status: COMPLETED | OUTPATIENT
Start: 2025-04-15 | End: 2025-04-15

## 2025-04-15 RX ORDER — FENTANYL CITRATE 50 UG/ML
INJECTION, SOLUTION INTRAMUSCULAR; INTRAVENOUS PRN
Status: DISCONTINUED | OUTPATIENT
Start: 2025-04-15 | End: 2025-04-15

## 2025-04-15 RX ORDER — NALOXONE HYDROCHLORIDE 0.4 MG/ML
0.1 INJECTION, SOLUTION INTRAMUSCULAR; INTRAVENOUS; SUBCUTANEOUS
Status: DISCONTINUED | OUTPATIENT
Start: 2025-04-15 | End: 2025-04-16 | Stop reason: HOSPADM

## 2025-04-15 RX ORDER — GLYCOPYRROLATE 0.2 MG/ML
INJECTION, SOLUTION INTRAMUSCULAR; INTRAVENOUS PRN
Status: DISCONTINUED | OUTPATIENT
Start: 2025-04-15 | End: 2025-04-15

## 2025-04-15 RX ORDER — DEXAMETHASONE SODIUM PHOSPHATE 10 MG/ML
10 INJECTION, SOLUTION INTRAMUSCULAR; INTRAVENOUS ONCE
Status: DISCONTINUED | OUTPATIENT
Start: 2025-04-15 | End: 2025-04-15 | Stop reason: HOSPADM

## 2025-04-15 RX ORDER — FENTANYL CITRATE 50 UG/ML
50 INJECTION, SOLUTION INTRAMUSCULAR; INTRAVENOUS EVERY 5 MIN PRN
Status: DISCONTINUED | OUTPATIENT
Start: 2025-04-15 | End: 2025-04-15 | Stop reason: HOSPADM

## 2025-04-15 RX ORDER — ONDANSETRON 2 MG/ML
4 INJECTION INTRAMUSCULAR; INTRAVENOUS EVERY 30 MIN PRN
Status: DISCONTINUED | OUTPATIENT
Start: 2025-04-15 | End: 2025-04-16 | Stop reason: HOSPADM

## 2025-04-15 RX ORDER — LIDOCAINE 40 MG/G
CREAM TOPICAL
Status: DISCONTINUED | OUTPATIENT
Start: 2025-04-15 | End: 2025-04-15 | Stop reason: HOSPADM

## 2025-04-15 RX ORDER — OXYCODONE HYDROCHLORIDE 5 MG/1
5 TABLET ORAL
Status: DISCONTINUED | OUTPATIENT
Start: 2025-04-15 | End: 2025-04-16 | Stop reason: HOSPADM

## 2025-04-15 RX ORDER — DEXAMETHASONE SODIUM PHOSPHATE 10 MG/ML
4 INJECTION, SOLUTION INTRAMUSCULAR; INTRAVENOUS
Status: DISCONTINUED | OUTPATIENT
Start: 2025-04-15 | End: 2025-04-16 | Stop reason: HOSPADM

## 2025-04-15 RX ORDER — FENTANYL CITRATE 50 UG/ML
25 INJECTION, SOLUTION INTRAMUSCULAR; INTRAVENOUS EVERY 5 MIN PRN
Status: DISCONTINUED | OUTPATIENT
Start: 2025-04-15 | End: 2025-04-15 | Stop reason: HOSPADM

## 2025-04-15 RX ORDER — ONDANSETRON 2 MG/ML
INJECTION INTRAMUSCULAR; INTRAVENOUS PRN
Status: DISCONTINUED | OUTPATIENT
Start: 2025-04-15 | End: 2025-04-15

## 2025-04-15 RX ORDER — SODIUM CHLORIDE, SODIUM LACTATE, POTASSIUM CHLORIDE, CALCIUM CHLORIDE 600; 310; 30; 20 MG/100ML; MG/100ML; MG/100ML; MG/100ML
INJECTION, SOLUTION INTRAVENOUS CONTINUOUS
Status: DISCONTINUED | OUTPATIENT
Start: 2025-04-15 | End: 2025-04-15 | Stop reason: HOSPADM

## 2025-04-15 RX ORDER — ONDANSETRON 4 MG/1
4 TABLET, ORALLY DISINTEGRATING ORAL EVERY 30 MIN PRN
Status: DISCONTINUED | OUTPATIENT
Start: 2025-04-15 | End: 2025-04-15 | Stop reason: HOSPADM

## 2025-04-15 RX ORDER — LIDOCAINE HYDROCHLORIDE 20 MG/ML
INJECTION, SOLUTION INFILTRATION; PERINEURAL PRN
Status: DISCONTINUED | OUTPATIENT
Start: 2025-04-15 | End: 2025-04-15

## 2025-04-15 RX ORDER — HYDROMORPHONE HYDROCHLORIDE 1 MG/ML
0.2 INJECTION, SOLUTION INTRAMUSCULAR; INTRAVENOUS; SUBCUTANEOUS EVERY 5 MIN PRN
Status: DISCONTINUED | OUTPATIENT
Start: 2025-04-15 | End: 2025-04-15 | Stop reason: HOSPADM

## 2025-04-15 RX ORDER — OXYCODONE HYDROCHLORIDE 5 MG/1
5-10 TABLET ORAL EVERY 4 HOURS PRN
Qty: 6 TABLET | Refills: 0 | Status: SHIPPED | OUTPATIENT
Start: 2025-04-15

## 2025-04-15 RX ORDER — PROPOFOL 10 MG/ML
INJECTION, EMULSION INTRAVENOUS CONTINUOUS PRN
Status: DISCONTINUED | OUTPATIENT
Start: 2025-04-15 | End: 2025-04-15

## 2025-04-15 RX ADMIN — ACETAMINOPHEN 975 MG: 325 TABLET ORAL at 11:44

## 2025-04-15 RX ADMIN — LIDOCAINE HYDROCHLORIDE 80 MG: 20 INJECTION, SOLUTION INFILTRATION; PERINEURAL at 13:09

## 2025-04-15 RX ADMIN — PROPOFOL 50 MG: 10 INJECTION, EMULSION INTRAVENOUS at 13:28

## 2025-04-15 RX ADMIN — Medication 0.5 MG: at 13:43

## 2025-04-15 RX ADMIN — FENTANYL CITRATE 100 MCG: 50 INJECTION, SOLUTION INTRAMUSCULAR; INTRAVENOUS at 13:09

## 2025-04-15 RX ADMIN — Medication 50 MCG: at 13:45

## 2025-04-15 RX ADMIN — GLYCOPYRROLATE 0.2 MG: 0.2 INJECTION, SOLUTION INTRAMUSCULAR; INTRAVENOUS at 13:25

## 2025-04-15 RX ADMIN — PROPOFOL 150 MCG/KG/MIN: 10 INJECTION, EMULSION INTRAVENOUS at 13:09

## 2025-04-15 RX ADMIN — PROPOFOL 200 MG: 10 INJECTION, EMULSION INTRAVENOUS at 13:09

## 2025-04-15 RX ADMIN — SODIUM CHLORIDE, SODIUM LACTATE, POTASSIUM CHLORIDE, CALCIUM CHLORIDE: 600; 310; 30; 20 INJECTION, SOLUTION INTRAVENOUS at 11:43

## 2025-04-15 RX ADMIN — ONDANSETRON 4 MG: 2 INJECTION INTRAMUSCULAR; INTRAVENOUS at 13:50

## 2025-04-15 NOTE — ANESTHESIA PROCEDURE NOTES
Airway       Patient location during procedure: OR       Procedure Start/Stop Times: 4/15/2025 1:11 PM  Staff -        CRNA: Jamia Garcia APRN CRNA       Other Anesthesia Staff: Martha Westfall       Performed By: CRNA, EVONNE and anesthesiologist  Consent for Airway        Urgency: elective  Indications and Patient Condition       Indications for airway management: maci-procedural       Induction type:intravenous       Mask difficulty assessment: 1 - vent by mask    Final Airway Details       Final airway type: endotracheal airway       Successful airway: NIM and ETT - single  Endotracheal Airway Details        ETT size (mm): 6.0       Cuffed: yes       Successful intubation technique: video laryngoscopy       VL Blade Size: Glidescope 3       Grade View of Cords: 1       Adjucts: stylet       Position: Right       Measured from: lips       Secured at (cm): 22       Bite block used: None    Post intubation assessment        Placement verified by: capnometry, equal breath sounds and chest rise        Number of attempts at approach: 1       Number of other approaches attempted: 0       Secured with: tape       Ease of procedure: easy       Dentition: Intact and Unchanged    Medication(s) Administered   Medication Administration Time: 4/15/2025 1:11 PM

## 2025-04-15 NOTE — BRIEF OP NOTE
[facility]    Brief Operative Note    Pre-operative diagnosis: Hyperparathyroidism [E21.3]  Post-operative diagnosis Same as pre-operative diagnosis    Procedure: NECK EXPLORATION, RESECTION RIGHT SUPERIOR PARATHYROID, N/A - Neck    Surgeon: Surgeons and Role:     * Alisha Abrams MD - Primary     * Luz Granda MD - Resident - Assisting  Anesthesia: General   Estimated Blood Loss: Minimal    Drains: None  Specimens:   ID Type Source Tests Collected by Time Destination   1 : RIGHT SUPERIOR PARATHYROID Tissue Parathyroid, Superior, Right SURGICAL PATHOLOGY EXAM Alisha Abrams MD 4/15/2025  1:37 PM    A : PRE PTH Blood Foot, Left PARATHYROID HORMONE INTACT Jamia Garcia, APRN CRNA 4/15/2025  1:20 PM    B : POST PTH Blood Foot, Right PARATHYROID HORMONE INTACT Jamia Garcia, VENKAT CRNA 4/15/2025  1:53 PM      Findings:   None.  Complications: None.  Implants: * No implants in log *

## 2025-04-15 NOTE — ANESTHESIA POSTPROCEDURE EVALUATION
Patient: Mary Irizarry    Procedure: Procedure(s):  NECK EXPLORATION, RESECTION RIGHT SUPERIOR PARATHYROID       Anesthesia Type:  General    Note:  Disposition: Outpatient   Postop Pain Control: Uneventful            Sign Out: Well controlled pain   PONV: No   Neuro/Psych: Uneventful            Sign Out: Acceptable/Baseline neuro status   Airway/Respiratory: Uneventful            Sign Out: Acceptable/Baseline resp. status   CV/Hemodynamics: Uneventful            Sign Out: Acceptable CV status; No obvious hypovolemia; No obvious fluid overload   Other NRE: NONE   DID A NON-ROUTINE EVENT OCCUR? No       Last vitals:  Vitals Value Taken Time   /92 04/15/25 1445   Temp 36.8  C (98.24  F) 04/15/25 1445   Pulse 55 04/15/25 1445   Resp 0 04/15/25 1445   SpO2 93 % 04/15/25 1445   Vitals shown include unfiled device data.    Electronically Signed By: Kalee Messer MD  April 15, 2025  2:51 PM

## 2025-04-15 NOTE — ANESTHESIA CARE TRANSFER NOTE
Patient: Mary Irizarry    Procedure: Procedure(s):  NECK EXPLORATION, RESECTION RIGHT SUPERIOR PARATHYROID       Diagnosis: Hyperparathyroidism [E21.3]  Diagnosis Additional Information: No value filed.    Anesthesia Type:   General     Note:    Oropharynx: oropharynx clear of all foreign objects and spontaneously breathing  Level of Consciousness: drowsy  Oxygen Supplementation: face mask  Level of Supplemental Oxygen (L/min / FiO2): 6  Independent Airway: airway patency satisfactory and stable  Dentition: dentition unchanged  Vital Signs Stable: post-procedure vital signs reviewed and stable  Report to RN Given: handoff report given  Patient transferred to: PACU  Comments: Resps easy and reg. Report to PACU RN   Handoff Report: Identifed the Patient, Identified the Reponsible Provider, Reviewed the pertinent medical history, Discussed the surgical course, Reviewed Intra-OP anesthesia mangement and issues during anesthesia, Set expectations for post-procedure period and Allowed opportunity for questions and acknowledgement of understanding      Vitals:  Vitals Value Taken Time   /86 04/15/25 1404   Temp 36.7  C (98.06  F) 04/15/25 1406   Pulse 75 04/15/25 1406   Resp 25 04/15/25 1406   SpO2 94 % 04/15/25 1406   Vitals shown include unfiled device data.    Electronically Signed By: VENKAT Mathew CRNA  April 15, 2025  2:08 PM

## 2025-04-15 NOTE — ANESTHESIA PREPROCEDURE EVALUATION
Anesthesia Pre-Procedure Evaluation    Patient: Mary Irizarry   MRN: [id number] : 1960        Procedure : Procedure(s):  PARATHYROIDECTOMY          No past medical history on file.   Past Surgical History:   Procedure Laterality Date     COLONOSCOPY WITH CO2 INSUFFLATION N/A 2023    Procedure: Colonoscopy with CO2 insufflation;  Surgeon: Keara Belle DO;  Location: [facility] OR     ENT SURGERY      mouth     LASIK      monovision - left is near eye     SOFT TISSUE SURGERY      remove needle from foot      Allergies   Allergen Reactions     Levaquin [Levofloxacin] Other (See Comments)     tendonitis     Penicillins Rash      Social History     Tobacco Use     Smoking status: Former     Passive exposure: Never     Smokeless tobacco: Never   Substance Use Topics     Alcohol use: Yes      Wt Readings from Last 1 Encounters:   25 86.6 kg (191 lb)        Anesthesia Evaluation   Pt has had prior anesthetic. Type: MAC.    No history of anesthetic complications       ROS/MED HX  ENT/Pulmonary:  - neg pulmonary ROS     Neurologic:  - neg neurologic ROS     Cardiovascular: Comment: RBBB      METS/Exercise Tolerance: 4 - Raking leaves, gardening    Hematologic:  - neg hematologic  ROS     Musculoskeletal:  - neg musculoskeletal ROS     GI/Hepatic:  - neg GI/hepatic ROS     Renal/Genitourinary:  - neg Renal ROS     Endo: Comment: Hyperparathyroidism      Psychiatric/Substance Use:  - neg psychiatric ROS     Infectious Disease:  - neg infectious disease ROS     Malignancy:  - neg malignancy ROS     Other:            Physical Exam    Airway  airway exam normal      Mallampati: II   TM distance: > 3 FB   Neck ROM: full   Mouth opening: > 3 cm    Respiratory Devices and Support         Dental       (+) Minor Abnormalities - some fillings, tiny chips      Cardiovascular   cardiovascular exam normal       Rhythm and rate: regular and normal     Pulmonary   pulmonary exam normal        breath sounds clear to  "auscultation       OUTSIDE LABS:  CBC:   Lab Results   Component Value Date    WBC 6.4 03/24/2025    WBC 5.4 02/22/2024    HGB 14.3 03/24/2025    HGB 15.0 02/22/2024    HCT 45.5 03/24/2025    HCT 45.9 02/22/2024     03/24/2025     02/22/2024     BMP:   Lab Results   Component Value Date     03/24/2025     11/13/2024    POTASSIUM 4.9 03/24/2025    POTASSIUM 4.5 11/13/2024    CHLORIDE 107 03/24/2025    CHLORIDE 107 11/13/2024    CO2 26 03/24/2025    CO2 24 11/13/2024    BUN 13.6 03/24/2025    BUN 16.8 11/13/2024    CR 0.72 03/24/2025    CR 0.84 11/13/2024     (H) 03/24/2025    GLC 92 11/13/2024     COAGS: No results found for: \"PTT\", \"INR\", \"FIBR\"  POC: No results found for: \"BGM\", \"HCG\", \"HCGS\"  HEPATIC:   Lab Results   Component Value Date    ALBUMIN 4.3 11/13/2024    PROTTOTAL 6.5 11/13/2024    ALT 32 11/13/2024    AST 29 11/13/2024    ALKPHOS 72 11/13/2024    BILITOTAL 0.2 11/13/2024     OTHER:   Lab Results   Component Value Date    A1C 5.5 03/24/2025    POLLO 11.1 (H) 03/24/2025    PHOS 3.8 07/31/2023    MAG 1.9 07/31/2023    TSH 1.36 02/22/2024    T4 0.84 09/10/2018    T3 91 09/10/2018    CRP <2.9 05/15/2019    SED 4 02/22/2024       Anesthesia Plan    ASA Status:  2    NPO Status:  NPO Appropriate    Anesthesia Type: General.     - Airway: ETT   Induction: Intravenous, Propofol.   Maintenance: TIVA.   Techniques and Equipment:     - Airway: Video-Laryngoscope       Consents    Anesthesia Plan(s) and associated risks, benefits, and realistic alternatives discussed. Questions answered and patient/representative(s) expressed understanding.     - Discussed:     - Discussed with:  Patient      - Extended Intubation/Ventilatory Support Discussed: No.      - Patient is DNR/DNI Status: No     Use of blood products discussed: No .     Postoperative Care    Pain management: Multi-modal analgesia.   PONV prophylaxis: Ondansetron (or other 5HT-3), Dexamethasone or Solumedrol, Background " "Propofol Infusion     Comments:             Kalee Messer MD    Clinically Significant Risk Factors Present on Admission                             # Overweight: Estimated body mass index is 27.41 kg/m  as calculated from the following:    Height as of 3/24/25: 1.778 m (5' 10\").    Weight as of 3/24/25: 86.6 kg (191 lb).                "

## 2025-04-15 NOTE — DISCHARGE INSTRUCTIONS
[facility]  Home Care Following Anesthesia  For 24 hours after surgery:  Get plenty of rest.  A responsible adult must stay with you for at least 24 hours after you leave the surgery center.  Do not drive or use heavy equipment.  If you have weakness or tingling, don't drive or use heavy equipment until this feeling goes away.   Do not drink alcohol.   Avoid strenuous or risky activities.  Ask for help when climbing stairs.  You may feel lightheaded.  IF so, sit for a few minutes before standing.  Have someone help you get up.   If you have nausea (feel sick to your stomach): Drink only clear liquids such as apple juice, ginger ale, broth or 7-Up.  Rest may also help.  Be sure to drink enough fluids.  Move to a regular diet as you feel able.   You may have a slight fever.  Call the doctor if your fever is over 100 F (37.7 C) (taken under the tongue) or lasts longer than 24 hours.  You may have a dry mouth, a sore throat, muscle aches or trouble sleeping. These should go away after 24 hours.  Do not make important or legal decisions.   It is recommended to avoid smoking.               Tips for taking pain medications  To get the best pain relief possible, remember these points:  Take pain medications as directed, before pain becomes severe.  Pain medication can upset your stomach: taking it with food may help.  Constipation is a common side effect of pain medication. Drink plenty of  fluids.  Eat foods high in fiber. Take a stool softener if recommended by your doctor or pharmacist.  Do not drink alcohol, drive or operate machinery while taking pain medications.  Ask about other ways to control pain, such as with heat, ice or relaxation.    Tylenol/Acetaminophen Consumption    If you feel your pain relief is insufficient, you may take Tylenol/Acetaminophen in addition to your narcotic pain medication.   Be careful not to exceed 4,000 mg of Tylenol/Acetaminophen in a 24 hour  period from all sources.  If you are taking extra strength Tylenol/acetaminophen (500 mg), the maximum dose is 8 tablets in 24 hours.  If you are taking regular strength acetaminophen (325 mg), the maximum dose is 12 tablets in 24 hours.    Call a doctor for any of the following:  Signs of infection (fever, growing tenderness at the surgery site, a large amount of drainage or bleeding, severe pain, foul-smelling drainage, redness, swelling).  It has been over 8 to 10 hours since surgery and you are still not able to urinate (pass water).  Headache for over 24 hours.      Your doctor is:  Dr. Alisha Abrams, ENT Otolaryngology: [phone]   (Monday-Friday 8 am to 4 pm)               After hours and weekends call the hospital @ [phone] and ask for the resident on call for:  ENT Otolaryngology  For emergency care, call the:  [facility]:  [phone] (TTY for hearing impaired: [phone])

## 2025-04-17 LAB
PATH REPORT.COMMENTS IMP SPEC: NORMAL
PATH REPORT.COMMENTS IMP SPEC: NORMAL
PATH REPORT.FINAL DX SPEC: NORMAL
PATH REPORT.GROSS SPEC: NORMAL
PATH REPORT.INTRAOP OBS SPEC DOC: NORMAL
PATH REPORT.MICROSCOPIC SPEC OTHER STN: NORMAL
PATH REPORT.RELEVANT HX SPEC: NORMAL
PHOTO IMAGE: NORMAL

## 2025-04-22 ENCOUNTER — MYC MEDICAL ADVICE (OUTPATIENT)
Dept: SURGERY | Facility: CLINIC | Age: 65
End: 2025-04-22
Payer: COMMERCIAL

## 2025-04-22 ENCOUNTER — TELEPHONE (OUTPATIENT)
Dept: SURGERY | Facility: CLINIC | Age: 65
End: 2025-04-22
Payer: COMMERCIAL

## 2025-04-22 NOTE — TELEPHONE ENCOUNTER
"Reviewed chart.  Pt is scheduled for follow up Thursday-4/24-[facility].      Called pt to discuss post op symptoms.  Pt reports her incision is bothering her, but her main complaint is lack of sleeping, anxiety/hyperactive feelings alternating with depression/low energy.   Pt is 1 week out from neck exploration, right superior parathyroid resection.    Pt states incision is \"puffy\", red, a little warm/itchy.  No drainage, no increase pain in incision.      Pt reports \"hot flashes\"/chills-pt states she thinks this is due to menopause. She states her stomach to her chest- have a \"weird feeling\".    Mary is reporting terrible anxiety, states she is very emotional, weepy and sad.  Pt feels depressed, no motivation.      Before surgery: pt would get 4-5 hours sleep per night.    Pt states the day after surgery she had a ton of energy, then it changed to \"anxiety\".   Pt states her body feels anxious.    Feels like she drank too much coffee.  Pt reports several nights of being unable to sleep.  Three nights before today, difficulty going to sleep.  Pt states she took sleeping pills (Ambien) three days in a row recently.   She typically takes one every few months.       Pt states she is NOT taking Oxycodone for pain any longer.  4/6 pills have been taken.     Pt denies feeling like she wants to hurt herself, but pt states feeling scared.     Bruising on left side, is this due to transferring during surgery?    I instructed her to send several photos of her incision as well as bruising on her left side via [employer].  I sent to Dr. Abrams for review/recommendations 4/22.     Pt responded via [employer] and Dr. Abrams reviewed and provided recommendations in [facility] encounter.   Carol Patton RN                 "

## 2025-04-22 NOTE — TELEPHONE ENCOUNTER
[facility] Call Center    Phone Message    May a detailed message be left on voicemail: yes     Reason for Call: Other: Pt having anxiety feelings since having the surgery and is having trouble sleeping, she also said her incision is looking puffy too.  Pt would like a call back. [facility] [place] location, thanks     Action Taken: Other: ENT    Travel Screening: Not Applicable     Date of Service:

## 2025-04-22 NOTE — TELEPHONE ENCOUNTER
"Reviewed chart.  Pt is scheduled for follow up Thursday-4/24-[facility].       Called pt to discuss post op symptoms.  Pt reports her incision is bothering her, but her main complaint is lack of sleeping, anxiety/hyperactive feelings alternating with depression/low energy.   Pt is 1 week out from neck exploration, right superior parathyroid resection.     Pt states incision is \"puffy\", red, a little warm/itchy.  No drainage, no increase pain in incision.       Pt reports \"hot flashes\"/chills-pt states she thinks this is due to menopause. She states her stomach to her chest- have a \"weird feeling\".     Mary is reporting terrible anxiety, states she is very emotional, weepy and sad.  Pt feels depressed, no motivation.       Before surgery: pt would get 4-5 hours sleep per night.     Pt states the day after surgery she had a ton of energy, then it changed to \"anxiety\".   Pt states her body feels anxious.    Feels like she drank too much coffee.  Pt reports several nights of being unable to sleep.  Three nights before today, difficulty going to sleep.  Pt states she took sleeping pills (Ambien) three days in a row recently.   She typically takes one every few months.        Pt states she is NOT taking Oxycodone for pain any longer.  4/6 pills have been taken.      Pt denies feeling like she wants to hurt herself, but pt states feeling scared.      Bruising on left side, is this due to transferring during surgery?     I instructed her to send several photos of her incision as well as bruising on her left side via [employer].  I will send to Dr. Abrams for review/recommendations.      Carol Patton RN   "

## 2025-07-02 ENCOUNTER — OFFICE VISIT (OUTPATIENT)
Dept: FAMILY MEDICINE | Facility: CLINIC | Age: 65
End: 2025-07-02
Payer: COMMERCIAL

## 2025-07-02 VITALS
RESPIRATION RATE: 16 BRPM | SYSTOLIC BLOOD PRESSURE: 119 MMHG | OXYGEN SATURATION: 96 % | HEART RATE: 62 BPM | BODY MASS INDEX: 27.41 KG/M2 | TEMPERATURE: 97.6 F | DIASTOLIC BLOOD PRESSURE: 76 MMHG | HEIGHT: 70 IN

## 2025-07-02 DIAGNOSIS — F41.9 ANXIETY: ICD-10-CM

## 2025-07-02 DIAGNOSIS — F32.A MILD DEPRESSIVE DISORDER: ICD-10-CM

## 2025-07-02 DIAGNOSIS — Z13.1 SCREENING FOR DIABETES MELLITUS: ICD-10-CM

## 2025-07-02 DIAGNOSIS — Z98.890 STATUS POST SURGERY: ICD-10-CM

## 2025-07-02 DIAGNOSIS — G47.00 INSOMNIA, UNSPECIFIED TYPE: Primary | ICD-10-CM

## 2025-07-02 DIAGNOSIS — R53.83 FATIGUE, UNSPECIFIED TYPE: ICD-10-CM

## 2025-07-02 DIAGNOSIS — Z86.39 HISTORY OF HYPERPARATHYROIDISM: ICD-10-CM

## 2025-07-02 LAB
BASOPHILS # BLD AUTO: 0 10E3/UL (ref 0–0.2)
BASOPHILS NFR BLD AUTO: 1 %
EOSINOPHIL # BLD AUTO: 0.2 10E3/UL (ref 0–0.7)
EOSINOPHIL NFR BLD AUTO: 3 %
ERYTHROCYTE [DISTWIDTH] IN BLOOD BY AUTOMATED COUNT: 13.2 % (ref 10–15)
EST. AVERAGE GLUCOSE BLD GHB EST-MCNC: 108 MG/DL
HBA1C MFR BLD: 5.4 % (ref 0–5.6)
HCT VFR BLD AUTO: 42.1 % (ref 35–47)
HGB BLD-MCNC: 13.2 G/DL (ref 11.7–15.7)
IMM GRANULOCYTES # BLD: 0 10E3/UL
IMM GRANULOCYTES NFR BLD: 0 %
LYMPHOCYTES # BLD AUTO: 1.7 10E3/UL (ref 0.8–5.3)
LYMPHOCYTES NFR BLD AUTO: 27 %
MCH RBC QN AUTO: 29.1 PG (ref 26.5–33)
MCHC RBC AUTO-ENTMCNC: 31.4 G/DL (ref 31.5–36.5)
MCV RBC AUTO: 93 FL (ref 78–100)
MONOCYTES # BLD AUTO: 0.5 10E3/UL (ref 0–1.3)
MONOCYTES NFR BLD AUTO: 7 %
NEUTROPHILS # BLD AUTO: 4 10E3/UL (ref 1.6–8.3)
NEUTROPHILS NFR BLD AUTO: 62 %
PLATELET # BLD AUTO: 229 10E3/UL (ref 150–450)
RBC # BLD AUTO: 4.53 10E6/UL (ref 3.8–5.2)
WBC # BLD AUTO: 6.5 10E3/UL (ref 4–11)

## 2025-07-02 PROCEDURE — 1126F AMNT PAIN NOTED NONE PRSNT: CPT | Performed by: FAMILY MEDICINE

## 2025-07-02 PROCEDURE — 3044F HG A1C LEVEL LT 7.0%: CPT | Performed by: FAMILY MEDICINE

## 2025-07-02 PROCEDURE — 3074F SYST BP LT 130 MM HG: CPT | Performed by: FAMILY MEDICINE

## 2025-07-02 PROCEDURE — 83970 ASSAY OF PARATHORMONE: CPT | Performed by: FAMILY MEDICINE

## 2025-07-02 PROCEDURE — 36415 COLL VENOUS BLD VENIPUNCTURE: CPT | Performed by: FAMILY MEDICINE

## 2025-07-02 PROCEDURE — 85025 COMPLETE CBC W/AUTO DIFF WBC: CPT | Performed by: FAMILY MEDICINE

## 2025-07-02 PROCEDURE — 84443 ASSAY THYROID STIM HORMONE: CPT | Performed by: FAMILY MEDICINE

## 2025-07-02 PROCEDURE — 83036 HEMOGLOBIN GLYCOSYLATED A1C: CPT | Performed by: FAMILY MEDICINE

## 2025-07-02 PROCEDURE — 3078F DIAST BP <80 MM HG: CPT | Performed by: FAMILY MEDICINE

## 2025-07-02 PROCEDURE — 99214 OFFICE O/P EST MOD 30 MIN: CPT | Performed by: FAMILY MEDICINE

## 2025-07-02 PROCEDURE — 80053 COMPREHEN METABOLIC PANEL: CPT | Performed by: FAMILY MEDICINE

## 2025-07-02 RX ORDER — SERTRALINE HYDROCHLORIDE 25 MG/1
25 TABLET, FILM COATED ORAL DAILY
Qty: 30 TABLET | Refills: 1 | Status: SHIPPED | OUTPATIENT
Start: 2025-07-02

## 2025-07-02 ASSESSMENT — ANXIETY QUESTIONNAIRES
6. BECOMING EASILY ANNOYED OR IRRITABLE: NOT AT ALL
2. NOT BEING ABLE TO STOP OR CONTROL WORRYING: SEVERAL DAYS
GAD7 TOTAL SCORE: 5
IF YOU CHECKED OFF ANY PROBLEMS ON THIS QUESTIONNAIRE, HOW DIFFICULT HAVE THESE PROBLEMS MADE IT FOR YOU TO DO YOUR WORK, TAKE CARE OF THINGS AT HOME, OR GET ALONG WITH OTHER PEOPLE: VERY DIFFICULT
5. BEING SO RESTLESS THAT IT IS HARD TO SIT STILL: SEVERAL DAYS
GAD7 TOTAL SCORE: 5
3. WORRYING TOO MUCH ABOUT DIFFERENT THINGS: SEVERAL DAYS
1. FEELING NERVOUS, ANXIOUS, OR ON EDGE: SEVERAL DAYS
7. FEELING AFRAID AS IF SOMETHING AWFUL MIGHT HAPPEN: SEVERAL DAYS

## 2025-07-02 ASSESSMENT — PATIENT HEALTH QUESTIONNAIRE - PHQ9
5. POOR APPETITE OR OVEREATING: NOT AT ALL
SUM OF ALL RESPONSES TO PHQ QUESTIONS 1-9: 7

## 2025-07-02 ASSESSMENT — PAIN SCALES - GENERAL: PAINLEVEL_OUTOF10: NO PAIN (0)

## 2025-07-02 NOTE — PROGRESS NOTES
"  Assessment & Plan     (G47.00) Insomnia, unspecified type  (primary encounter diagnosis)  Comment: discussed in detail.  We agreed to avoid sleeping meds.  Check labs, try to reduce napping, increase exercise.   Plan: as above. Also recommended book to read.     (Z13.1) Screening for diabetes mellitus  Comment: check   Plan: Hemoglobin A1c             (Z86.39) History of hyperparathyroidism  Comment: check; patient is s/p surgery   Plan: Parathyroid Hormone Intact             (R53.83) Fatigue, unspecified type  Comment: check   Plan: CBC with Platelets & Differential,         Comprehensive metabolic panel, TSH with free T4        reflex             (F41.9) Anxiety  Comment: trial of very low dose ssri   Plan: sertraline (ZOLOFT) 25 MG tablet             (Z98.890) Status post surgery  Comment: as above   Plan: recheck pth     (F32.A) Mild depressive disorder  Comment: as above   Plan: as above           BMI  Estimated body mass index is 27.41 kg/m  as calculated from the following:    Height as of this encounter: 1.778 m (5' 10\").    Weight as of 4/15/25: 86.6 kg (191 lb).   Weight management plan: Discussed healthy diet and exercise guidelines          Laura Hernandez is a 65 year old, presenting for the following health issues:  Insomnia        7/2/2025     5:08 PM   Additional Questions   Roomed by Tayla Nunn         7/2/2025   Declines Weight   Did patient decline having their weight taken? Yes     History of Present Illness       Reason for visit:  Insomnia  Symptoms include:  Drowsy & falling asleep during day  Symptom progression:  Worsening  Had these symptoms before:  No  What makes it worse:  Lack of sleep  What makes it better:  Sleep   She is taking medications regularly.           About 3-4 years     Used to get up early in am for swimming and work    Tried melatonin, various forms    Sleep about 4 hours per night for years    Now falling asleep during day    Had the parathyroid " "procedure    Now sleep through the 4 hours ( used to wake up a few times in the 4 hours )    Still can't sleep longer than 4 hours    Now affecting patient emotionally    Feels depressed    No thoughts of hurting self    Sad    Has normal stress but hard to handle it    Not taking any sleeping pills currently     Trying to find a therapist    Had procedure 4-15-25    No wt change     Can work in garden, energy level okay if active and doing things    If sitting then dozes off    Nap some every day    Exercise classes    Not working currently    No hot flashes    Bedroom is dark, quiet, cool, fan moving          Objective    /76 (BP Location: Right arm, Patient Position: Chair, Cuff Size: Adult Regular)   Pulse 62   Temp 97.6  F (36.4  C) (Temporal)   Resp 16   Ht 1.778 m (5' 10\")   SpO2 96%   BMI 27.41 kg/m    Body mass index is 27.41 kg/m .  Physical Exam  Constitutional:       Appearance: She is well-developed.   HENT:      Head: Normocephalic and atraumatic.   Eyes:      Conjunctiva/sclera: Conjunctivae normal.   Neck:      Vascular: No carotid bruit.   Cardiovascular:      Rate and Rhythm: Normal rate and regular rhythm.      Heart sounds: Normal heart sounds.   Pulmonary:      Effort: Pulmonary effort is normal. No respiratory distress.      Breath sounds: Normal breath sounds.   Neurological:      Mental Status: She is alert and oriented to person, place, and time.      No sinus/ submandib tenderness    Radials symmetric     No edema    Labs pending    See also phq9 and gad7                Signed Electronically by: Jacques Mills MD    "

## 2025-07-02 NOTE — PATIENT INSTRUCTIONS
Start sertraline 1/2 pill daily for about 3 weeks.  If not feeling any good effect then go to one pill daily.    If bad side effects let us know    We will send you lab results    Increase exercise    Try to reduce napping if possible    Do a phone follow up visit in 1 1/2 to 2 months    Could also read book Why we Sleep by Rex Sparks

## 2025-07-03 ENCOUNTER — RESULTS FOLLOW-UP (OUTPATIENT)
Dept: FAMILY MEDICINE | Facility: CLINIC | Age: 65
End: 2025-07-03

## 2025-07-03 LAB
ALBUMIN SERPL BCG-MCNC: 4.1 G/DL (ref 3.5–5.2)
ALP SERPL-CCNC: 69 U/L (ref 40–150)
ALT SERPL W P-5'-P-CCNC: 9 U/L (ref 0–50)
ANION GAP SERPL CALCULATED.3IONS-SCNC: 8 MMOL/L (ref 7–15)
AST SERPL W P-5'-P-CCNC: 23 U/L (ref 0–45)
BILIRUB SERPL-MCNC: 0.2 MG/DL
BUN SERPL-MCNC: 14 MG/DL (ref 8–23)
CALCIUM SERPL-MCNC: 9.5 MG/DL (ref 8.8–10.4)
CHLORIDE SERPL-SCNC: 107 MMOL/L (ref 98–107)
CREAT SERPL-MCNC: 0.89 MG/DL (ref 0.51–0.95)
EGFRCR SERPLBLD CKD-EPI 2021: 72 ML/MIN/1.73M2
GLUCOSE SERPL-MCNC: 100 MG/DL (ref 70–99)
HCO3 SERPL-SCNC: 25 MMOL/L (ref 22–29)
POTASSIUM SERPL-SCNC: 4.6 MMOL/L (ref 3.4–5.3)
PROT SERPL-MCNC: 5.8 G/DL (ref 6.4–8.3)
PTH-INTACT SERPL-MCNC: 58 PG/ML (ref 15–65)
SODIUM SERPL-SCNC: 140 MMOL/L (ref 135–145)
TSH SERPL DL<=0.005 MIU/L-ACNC: 1.39 UIU/ML (ref 0.3–4.2)

## 2025-07-08 ENCOUNTER — VIRTUAL VISIT (OUTPATIENT)
Dept: ENDOCRINOLOGY | Facility: CLINIC | Age: 65
End: 2025-07-08
Payer: COMMERCIAL

## 2025-07-08 DIAGNOSIS — E21.3 HYPERPARATHYROIDISM: Primary | ICD-10-CM

## 2025-07-08 DIAGNOSIS — M81.0 OSTEOPOROSIS, UNSPECIFIED OSTEOPOROSIS TYPE, UNSPECIFIED PATHOLOGICAL FRACTURE PRESENCE: ICD-10-CM

## 2025-07-08 PROCEDURE — 1126F AMNT PAIN NOTED NONE PRSNT: CPT | Mod: 95 | Performed by: STUDENT IN AN ORGANIZED HEALTH CARE EDUCATION/TRAINING PROGRAM

## 2025-07-08 PROCEDURE — G2211 COMPLEX E/M VISIT ADD ON: HCPCS | Performed by: STUDENT IN AN ORGANIZED HEALTH CARE EDUCATION/TRAINING PROGRAM

## 2025-07-08 PROCEDURE — 98006 SYNCH AUDIO-VIDEO EST MOD 30: CPT | Mod: 24 | Performed by: STUDENT IN AN ORGANIZED HEALTH CARE EDUCATION/TRAINING PROGRAM

## 2025-07-08 NOTE — PATIENT INSTRUCTIONS
CALCIUM Recommendation [id number] mg/day in divided dose of no more than 500 mg/dose. This includes what is in your food and also what is in any supplements you are taking.      Dietary sources of calcium: These also contain vitamin D  Milk / buttermilk              8 oz            300 mg  Dry milk powder       5 Tbsp             300 mg  Yogurt                          1 cup           400 mg  Ice cream   1/2 cup          100 mg  Hard cheese                     1.5 oz          300 mg  Cottage cheese                1/2 cup        65 mg  Spinach, cooked  1 cup  240 mg  Tofu, soft regular           4 oz          120 to 390 mg  Sardines                       3 oz               370 mg  Mackerel canned         3 oz                250 mg  Canned salmon with bones 3 oz        170-210 mg  Calcium fortified cereals are available  SILK soy and almond milk products are calcium fortified  Orange juice  Fortified with Calcium       8 oz            300 mg  Low fat dairy sources are recommended      Recommended exercise goals:    30 minutes of moderate exercise on most days of the week .  Weight bearing exercise (ie, walking, jogging, hiking, dancing)    Strength training 2 or more times/week in addition to other weight -being exercise.  Strength training -- uses weight or resistance beyond that seen in everyday activities -(pilates, weight training with free weights, weight machines or resistance bands)    OSTEOPOROSIS of the spine: avoid exercise machines that incorporate spinal flexion, trunk rotation, or forward bending   Specifically avoid abdominal exercisers, stationary bikes with moving handles, cross-country ski machines, rowing machines, and bicep curl machines  Spinal fractures: AVOID activities that place significant force on the spine such as horse back riding,  tennis, golf or bowling    Bone density DXA may be performed every 2 years.  It must be performed on the same machine for comparison.

## 2025-07-08 NOTE — PROGRESS NOTES
Endocrinology Clinic Visit 7/8/2025      Video-Visit Details    Type of service:  Video Visit  Joined the call at 7/8/2025, 8:05:48 am.  Left the call at 7/8/2025, 8:23:09 am.    Originating Location (pt. Location): Home        Distant Location (provider location):  Off-site    Mode of Communication:  Video Conference via [employer]    Physician has received verbal consent for a Video Visit from the patient? Yes    I spent a total of 30 minutes on the date of encounter reviewing medical records, evaluating the patient, coordinating care and documenting in the EHR, as detailed above.           NAME:  Mary Irizarry  PCP:  Carol Jaeger  MRN:  [id number]  Reason for Consult:  hypercalcemia  Requesting Provider:  Referred Self    Chief Complaint     Chief Complaint   Patient presents with    RECHECK       History of Present Illness     Mary Irizarry is a 64 year old female who is seen for hypercalcemia and PHPTH follow-up . Last seen 1/2025.     Her oldest lab was 5/2019, ca was elevated at 10.7 and remained elevated since then. No prior labs , she was not seeing doctors in the past. Most recent labs:     Latest Ref Rng 11/13/2024  3:30 PM 11/15/2024  5:55 AM   ENDO CALCIUM LABS-[facility]      Albumin 3.4 - 5.0 g/dL     Albumin 3.5 - 5.2 g/dL 4.3     Alkaline Phosphatase 40 - 150 U/L 72     Calcium 8.8 - 10.4 mg/dL 11.2 (H)     Calcium Urine g/24 h 0.10 - 0.30 g/spec  0.57 (H)    Calcium Urine mg/dL mg/dL  17.9    CK Total 30 - 225 U/L     Urea Nitrogen 7 - 30 mg/dL     Urea Nitrogen 8.0 - 23.0 mg/dL 16.8     Creatinine 0.51 - 0.95 mg/dL 0.84     Magnesium 1.7 - 2.3 mg/dL     Parathyroid Hormone Intact 15 - 65 pg/mL 89 (H)      phosphorus  3.8 on 7/2023  Vitd 33     Dxa scan 8/2023:  FINDINGS:               Lumbar Spine (L1-L4)      T-score: -2.0, marked degenerative changes present                Left Femoral Neck            T-score:  -1.8               Right Femoral Neck          T-score:  -1.9                Forearm (radius 33%)      T-score:  -2.9                  Lumbar (L1-L4) BMD: 0.945                             Total Hip Mean BMD: 0.782                 Forearm (radius 33%) BMD: 0.508    Parathyroid sestamibi : 11/2023 Findings highly suspicious for a parathyroid adenoma located along the right lateral margin of the upper esophagus and posterior to the inferior right thyroid lobe.       Symptoms of hypercalcemia: chronic constipation, no dyspepsia, no mental status changes/lethargy but tired, ? polyuria.    Calcium intake: Dietary: no milk, cheese every day , yogurt sometimes, a lot of greens. Supplements: yes unsure of dose prescribed by chiropractor     Vitamin D intake: Supplements: yes, unsure of dose    Previous fractures: No  Family history of fragility fracture in parent: N/A she does not know her FH  History of kidney stones: No  History of kidney disease: No  Family history of any calcium problems or kidney stones: N/A  History of vitamin D deficiency: No  History of HCTZ use: No  History of Lithium use: No  History of malabsorption (IBD, Celiac, gastric bypass ): No  History of thyroid disease: No  Menopause at age : late 40s  Non smoker, limited alcohol    Interval hx:  She is s/p rt superior parathyroidectomy 4/15/2025. No complications. Had few days of insomnia , feeling like anxiety which self resolved. She feels like she has more energy since after her surgery. She has chronic insomnia which is still there.     Social: she is an assisted .    Problem List     Patient Active Problem List   Diagnosis    Raynaud's syndrome    Herpes simplex virus infection    RBBB (right bundle branch block with left anterior fascicular block)    Atrial enlargement, left    Hyperparathyroidism        Medications     Current Outpatient Medications   Medication Sig Dispense Refill    acyclovir (ZOVIRAX) 400 MG tablet TAKE 1 TABLET BY MOUTH EVERY DAY MAY TAKE 1 TAB TWICE DAILY FOR 5 DAYS FOR BREAK  OUTS. 120 tablet 3    RETIN-A 0.1 % external cream Apply topically at bedtime. (Patient not taking: Reported on 7/2/2025)      sertraline (ZOLOFT) 25 MG tablet Take 1 tablet (25 mg) by mouth daily. 30 tablet 1     No current facility-administered medications for this visit.        Allergies     Allergies   Allergen Reactions    Levaquin [Levofloxacin] Other (See Comments)     tendonitis    Penicillins Rash       Medical / Surgical History     No past medical history on file.  Past Surgical History:   Procedure Laterality Date    COLONOSCOPY WITH CO2 INSUFFLATION N/A 12/7/2023    Procedure: Colonoscopy with CO2 insufflation;  Surgeon: Keara Belle DO;  Location: [facility] OR    ENT SURGERY      mouth    LASIK      monovision - left is near eye    PARATHYROIDECTOMY N/A 4/15/2025    Procedure: NECK EXPLORATION, RESECTION RIGHT SUPERIOR PARATHYROID;  Surgeon: Alisha Abrams MD;  Location: [facility] OR    SOFT TISSUE SURGERY      remove needle from foot       Social History     Social History     Socioeconomic History    Marital status: Single     Spouse name: Not on file    Number of children: Not on file    Years of education: Not on file    Highest education level: Not on file   Occupational History    Not on file   Tobacco Use    Smoking status: Former     Passive exposure: Never    Smokeless tobacco: Never   Vaping Use    Vaping status: Never Used   Substance and Sexual Activity    Alcohol use: Yes    Drug use: No    Sexual activity: Never   Other Topics Concern    Parent/sibling w/ CABG, MI or angioplasty before 65F 55M? Not Asked   Social History Narrative    Not on file     Social Drivers of Health     Financial Resource Strain: High Risk (12/23/2021)    Received from [facility]    Financial Resource Strain     Difficulty of Paying Living Expenses: Not on file     Difficulty of Paying Living Expenses: Not on file   Food Insecurity: Not on file   Transportation Needs: Not on file    Physical Activity: Not on file   Stress: Not on file   Social Connections: Unknown (12/23/2021)    Received from [employer] [facility]    Social Connections     Frequency of Communication with Friends and Family: Not on file   Interpersonal Safety: Low Risk  (4/15/2025)    Interpersonal Safety     Do you feel physically and emotionally safe where you currently live?: Yes     Within the past 12 months, have you been hit, slapped, kicked or otherwise physically hurt by someone?: No     Within the past 12 months, have you been humiliated or emotionally abused in other ways by your partner or ex-partner?: No   Housing Stability: Not on file       Family History     Family History   Problem Relation Age of Onset    Diabetes Father     Glaucoma No family hx of     Macular Degeneration No family hx of        ROS     12 ROS completed, pertinent positive and negative in HPI  Numbness in the feet bilaterally.     Physical Exam   There were no vitals taken for this visit.   GENERAL: alert and no distress  EYES: Eyes grossly normal to inspection.  No discharge or erythema, or obvious scleral/conjunctival abnormalities.  RESP: No audible wheeze, cough, or visible cyanosis.    SKIN: Visible skin clear. No significant rash, abnormal pigmentation or lesions.  NEURO: Cranial nerves grossly intact.  Mentation and speech appropriate for age.  PSYCH: Appropriate affect, tone, and pace of words     Labs/Imaging     Pertinent Labs were reviewed and updated in EPIC and discussed  .  Radiology Results were  reviewed and updated in EPIC and discussed briefly.    Summary of recent findings:   Lab Results   Component Value Date    A1C 5.6 02/22/2024       TSH   Date Value Ref Range Status   07/02/2025 1.39 0.30 - 4.20 uIU/mL Final   02/22/2024 1.36 0.30 - 4.20 uIU/mL Final   09/10/2018 1.03 0.40 - 4.00 mU/L Final     T4 Free   Date Value Ref Range Status   09/10/2018 0.84 0.76 - 1.46 ng/dL Final       Creatinine  "  Date Value Ref Range Status   07/02/2025 0.89 0.51 - 0.95 mg/dL Final   03/11/2020 0.92 0.52 - 1.04 mg/dL Final       Recent Labs   Lab Test 07/31/23  1501 03/11/20  1904   CHOL 220* 236*   HDL 77 95   * 125*   TRIG 54 79       No results found for: \"[id number]\", \"[id number]\", \"[id number]\"    I personally reviewed the patient's outside records from [facility] EMR and Care Everywhere. Summary of pertinent findings in HPI.    Impression / Plan     1. Hypercalcemia  2. hyperparathyroidism  3. Osteoporosis   4. Hypercalciuria   5. parathyroid adenoma     Work up confirms primary hyperparathyroidism.  S/p  successful rt superior parathyroidectomy 4/2025.      We discussed treating her OP with fosamax in the meantime. She prefers to hold off for now. Plan to check her dexa one year after parathyroidectomy. We reviewed adequate ca and vitd intake.    Labs at 6 m and 1 year        Test and/or medications prescribed today:  Orders Placed This Encounter   Procedures    DX Bone Density    25 Hydroxyvitamin D2 and D3    Calcium    Albumin level    Parathyroid Hormone Intact    Creatinine         Follow up: 1 year.    The longitudinal plan of care for the diagnosis(es)/condition(s) as documented were addressed during this visit. Due to the added complexity in care, I will continue to support Mary in the subsequent management and with ongoing continuity of care.    Nura Wang MD  Endocrinology, Diabetes and Metabolism  [facility]      "

## 2025-07-08 NOTE — LETTER
7/8/2025      Mary Irizarry  [address] [facility] [address]      Dear Colleague,    Thank you for referring your patient, Mary Irizarry, to the [facility]. Please see a copy of my visit note below.    Endocrinology Clinic Visit 7/8/2025      Video-Visit Details    Type of service:  Video Visit  Joined the call at 7/8/2025, 8:05:48 am.  Left the call at 7/8/2025, 8:23:09 am.    Originating Location (pt. Location): Home        Distant Location (provider location):  Off-site    Mode of Communication:  Video Conference via [employer]    Physician has received verbal consent for a Video Visit from the patient? Yes    I spent a total of 30 minutes on the date of encounter reviewing medical records, evaluating the patient, coordinating care and documenting in the EHR, as detailed above.         NAME:  Mary Irizarry  PCP:  Carol Jaeger  MRN:  [id number]  Reason for Consult:  hypercalcemia  Requesting Provider:  Referred Self    Chief Complaint     Chief Complaint   Patient presents with    RECHECK       History of Present Illness     Mary Irizarry is a 64 year old female who is seen for hypercalcemia and PHPTH follow-up . Last seen 1/2025.     Her oldest lab was 5/2019, ca was elevated at 10.7 and remained elevated since then. No prior labs , she was not seeing doctors in the past. Most recent labs:     Latest Ref Rng 11/13/2024  3:30 PM 11/15/2024  5:55 AM   ENDO CALCIUM LABS-UMP      Albumin 3.4 - 5.0 g/dL     Albumin 3.5 - 5.2 g/dL 4.3     Alkaline Phosphatase 40 - 150 U/L 72     Calcium 8.8 - 10.4 mg/dL 11.2 (H)     Calcium Urine g/24 h 0.10 - 0.30 g/spec  0.57 (H)    Calcium Urine mg/dL mg/dL  17.9    CK Total 30 - 225 U/L     Urea Nitrogen 7 - 30 mg/dL     Urea Nitrogen 8.0 - 23.0 mg/dL 16.8     Creatinine 0.51 - 0.95 mg/dL 0.84     Magnesium 1.7 - 2.3 mg/dL     Parathyroid Hormone Intact 15 - 65 pg/mL 89 (H)      phosphorus  3.8 on 7/2023  Vitd  33     Dxa scan 8/2023:  FINDINGS:               Lumbar Spine (L1-L4)      T-score: -2.0, marked degenerative changes present                Left Femoral Neck            T-score:  -1.8               Right Femoral Neck          T-score:  -1.9               Forearm (radius 33%)      T-score:  -2.9                  Lumbar (L1-L4) BMD: 0.945                             Total Hip Mean BMD: 0.782                 Forearm (radius 33%) BMD: 0.508    Parathyroid sestamibi : 11/2023 Findings highly suspicious for a parathyroid adenoma located along the right lateral margin of the upper esophagus and posterior to the inferior right thyroid lobe.     Symptoms of hypercalcemia: chronic constipation, no dyspepsia, no mental status changes/lethargy but tired, ? polyuria.    Calcium intake: Dietary: no milk, cheese every day , yogurt sometimes, a lot of greens. Supplements: yes unsure of dose prescribed by chiropractor     Vitamin D intake: Supplements: yes, unsure of dose    Previous fractures: No  Family history of fragility fracture in parent: N/A she does not know her FH  History of kidney stones: No  History of kidney disease: No  Family history of any calcium problems or kidney stones: N/A  History of vitamin D deficiency: No  History of HCTZ use: No  History of Lithium use: No  History of malabsorption (IBD, Celiac, gastric bypass ): No  History of thyroid disease: No  Menopause at age : late 40s  Non smoker, limited alcohol    Interval hx:  She is s/p rt superior parathyroidectomy 4/15/2025. No complications. Had few days of insomnia , feeling like anxiety which self resolved. She feels like she has more energy since after her surgery. She has chronic insomnia which is still there.     Social: she is an assisted .    Problem List     Patient Active Problem List   Diagnosis    Raynaud's syndrome    Herpes simplex virus infection    RBBB (right bundle branch block with left anterior fascicular block)    Atrial  enlargement, left    Hyperparathyroidism      Medications     Current Outpatient Medications   Medication Sig Dispense Refill    acyclovir (ZOVIRAX) 400 MG tablet TAKE 1 TABLET BY MOUTH EVERY DAY MAY TAKE 1 TAB TWICE DAILY FOR 5 DAYS FOR BREAK OUTS. 120 tablet 3    RETIN-A 0.1 % external cream Apply topically at bedtime. (Patient not taking: Reported on 7/2/2025)      sertraline (ZOLOFT) 25 MG tablet Take 1 tablet (25 mg) by mouth daily. 30 tablet 1     No current facility-administered medications for this visit.      Allergies     Allergies   Allergen Reactions    Levaquin [Levofloxacin] Other (See Comments)     tendonitis    Penicillins Rash     Medical / Surgical History     No past medical history on file.  Past Surgical History:   Procedure Laterality Date    COLONOSCOPY WITH CO2 INSUFFLATION N/A 12/7/2023    Procedure: Colonoscopy with CO2 insufflation;  Surgeon: Keara Belle DO;  Location: [facility] OR    ENT SURGERY      mouth    LASIK      monovision - left is near eye    PARATHYROIDECTOMY N/A 4/15/2025    Procedure: NECK EXPLORATION, RESECTION RIGHT SUPERIOR PARATHYROID;  Surgeon: Alisha Abrams MD;  Location: [facility] OR    SOFT TISSUE SURGERY      remove needle from foot     Social History     Social History     Socioeconomic History    Marital status: Single     Spouse name: Not on file    Number of children: Not on file    Years of education: Not on file    Highest education level: Not on file   Occupational History    Not on file   Tobacco Use    Smoking status: Former     Passive exposure: Never    Smokeless tobacco: Never   Vaping Use    Vaping status: Never Used   Substance and Sexual Activity    Alcohol use: Yes    Drug use: No    Sexual activity: Never   Other Topics Concern    Parent/sibling w/ CABG, MI or angioplasty before 65F 55M? Not Asked   Social History Narrative    Not on file     Social Drivers of Health     Financial Resource Strain: High Risk (12/23/2021)    Received from [facility]    Financial Resource Strain     Difficulty of Paying Living Expenses: Not on file     Difficulty of Paying Living Expenses: Not on file   Food Insecurity: Not on file   Transportation Needs: Not on file   Physical Activity: Not on file   Stress: Not on file   Social Connections: Unknown (12/23/2021)    Received from [facility]    Social Connections     Frequency of Communication with Friends and Family: Not on file   Interpersonal Safety: Low Risk  (4/15/2025)    Interpersonal Safety     Do you feel physically and emotionally safe where you currently live?: Yes     Within the past 12 months, have you been hit, slapped, kicked or otherwise physically hurt by someone?: No     Within the past 12 months, have you been humiliated or emotionally abused in other ways by your partner or ex-partner?: No   Housing Stability: Not on file     Family History     Family History   Problem Relation Age of Onset    Diabetes Father     Glaucoma No family hx of     Macular Degeneration No family hx of        ROS     12 ROS completed, pertinent positive and negative in HPI  Numbness in the feet bilaterally.     Physical Exam   There were no vitals taken for this visit.   GENERAL: alert and no distress  EYES: Eyes grossly normal to inspection.  No discharge or erythema, or obvious scleral/conjunctival abnormalities.  RESP: No audible wheeze, cough, or visible cyanosis.    SKIN: Visible skin clear. No significant rash, abnormal pigmentation or lesions.  NEURO: Cranial nerves grossly intact.  Mentation and speech appropriate for age.  PSYCH: Appropriate affect, tone, and pace of words     Labs/Imaging     Pertinent Labs were reviewed and updated in EPIC and discussed  .  Radiology Results were  reviewed and updated in EPIC and discussed briefly.    Summary of recent findings:   Lab Results   Component Value Date    A1C 5.6 02/22/2024       TSH   Date Value Ref Range  "Status   07/02/2025 1.39 0.30 - 4.20 uIU/mL Final   02/22/2024 1.36 0.30 - 4.20 uIU/mL Final   09/10/2018 1.03 0.40 - 4.00 mU/L Final     T4 Free   Date Value Ref Range Status   09/10/2018 0.84 0.76 - 1.46 ng/dL Final       Creatinine   Date Value Ref Range Status   07/02/2025 0.89 0.51 - 0.95 mg/dL Final   03/11/2020 0.92 0.52 - 1.04 mg/dL Final       Recent Labs   Lab Test 07/31/23  1501 03/11/20  1904   CHOL 220* 236*   HDL 77 95   * 125*   TRIG 54 79       No results found for: \"[id number]\", \"[id number]\", \"[id number]\"    I personally reviewed the patient's outside records from "[employer] EMR and Care Everywhere. Summary of pertinent findings in HPI.    Impression / Plan     1. Hypercalcemia  2. hyperparathyroidism  3. Osteoporosis   4. Hypercalciuria   5. parathyroid adenoma     Work up confirms primary hyperparathyroidism.  S/p  successful rt superior parathyroidectomy 4/2025.      We discussed treating her OP with fosamax in the meantime. She prefers to hold off for now. Plan to check her dexa one year after parathyroidectomy. We reviewed adequate ca and vitd intake.    Labs at 6 m and 1 year      Test and/or medications prescribed today:  Orders Placed This Encounter   Procedures    DX Bone Density    25 Hydroxyvitamin D2 and D3    Calcium    Albumin level    Parathyroid Hormone Intact    Creatinine       Follow up: 1 year.    The longitudinal plan of care for the diagnosis(es)/condition(s) as documented were addressed during this visit. Due to the added complexity in care, I will continue to support Mary in the subsequent management and with ongoing continuity of care.    Nura Wang MD  Endocrinology, Diabetes and Metabolism  [facility]        Again, thank you for allowing me to participate in the care of your patient.        Sincerely,        Nura Wang MD    Electronically signed  "

## 2025-07-08 NOTE — NURSING NOTE
Current patient location: [address] [place] [id number]    Is the patient currently in the state of [place]? YES    Visit mode: VIDEO    If the visit is dropped, the patient can be reconnected by:VIDEO VISIT: Text to cell phone:   Telephone Information:   Mobile [phone]       Will anyone else be joining the visit? NO  (If patient encounters technical issues they should call [phone]    Are changes needed to the allergy or medication list? No    Are refills needed on medications prescribed by this physician? NO    Rooming Documentation:  Not applicable    Reason for visit: RECHFAUSTO FIGUEROA

## 2025-08-20 ENCOUNTER — PATIENT OUTREACH (OUTPATIENT)
Dept: CARE COORDINATION | Facility: CLINIC | Age: 65
End: 2025-08-20
Payer: COMMERCIAL

## 2025-08-27 ENCOUNTER — VIRTUAL VISIT (OUTPATIENT)
Dept: FAMILY MEDICINE | Facility: CLINIC | Age: 65
End: 2025-08-27
Payer: COMMERCIAL

## 2025-08-27 DIAGNOSIS — G47.00 INSOMNIA, UNSPECIFIED TYPE: ICD-10-CM

## 2025-08-27 DIAGNOSIS — F41.9 ANXIETY: Primary | ICD-10-CM

## 2025-08-27 DIAGNOSIS — Z72.820 SLEEP DEPRIVATION: ICD-10-CM

## 2025-08-27 PROCEDURE — 98005 SYNCH AUDIO-VIDEO EST LOW 20: CPT | Performed by: FAMILY MEDICINE

## 2025-08-27 RX ORDER — SERTRALINE HYDROCHLORIDE 25 MG/1
25 TABLET, FILM COATED ORAL DAILY
Qty: 90 TABLET | Refills: 1 | Status: SHIPPED | OUTPATIENT
Start: 2025-08-27

## 2025-08-28 ENCOUNTER — PATIENT OUTREACH (OUTPATIENT)
Dept: CARE COORDINATION | Facility: CLINIC | Age: 65
End: 2025-08-28
Payer: COMMERCIAL

## (undated) DEVICE — SOL NACL 0.9% IRRIG 500ML BOTTLE 2F7123

## (undated) DEVICE — ESU GROUND PAD ADULT W/CORD E7507

## (undated) DEVICE — CLIP HORIZON MED BLUE 002200

## (undated) DEVICE — SU CHROMIC 3-0 SH 27" G122H

## (undated) DEVICE — NDL BUTTERFLY 21G .75" 367281

## (undated) DEVICE — KIT ENDO FIRST STEP DISINFECTANT 200ML W/POUCH EP-4

## (undated) DEVICE — PACK ENT MINOR CUSTOM ASC

## (undated) DEVICE — PREP CHLORAPREP W/ORANGE TINT 10.5ML 260715

## (undated) DEVICE — GLOVE BIOGEL PI MICRO SZ 6.5 48565

## (undated) DEVICE — SU DERMABOND ADVANCED .7ML DNX12

## (undated) DEVICE — SUCTION MANIFOLD NEPTUNE 2 SYS 1 PORT 702-025-000

## (undated) DEVICE — ESU ELEC BLADE 2.75" COATED/INSULATED E1455

## (undated) DEVICE — SU MONOCRYL 5-0 P-3 18" UND Y493G

## (undated) DEVICE — PREP CHLORAPREP 26ML TINTED ORANGE  260815

## (undated) DEVICE — PAD CHUX UNDERPAD 23X24" 7136

## (undated) DEVICE — STRAP KNEE/BODY 31143004

## (undated) DEVICE — CLIP HORIZON SM RED WIDE SLOT 001201

## (undated) DEVICE — SPECIMEN CONTAINER URINE 90ML STERILE 75.1435.002

## (undated) DEVICE — LINEN TOWEL PACK X5 5464

## (undated) DEVICE — SOL WATER IRRIG 500ML BOTTLE 2F7113

## (undated) DEVICE — NIM PROBE NS STD INCR PRASS TIP STRL LF DISP 8225825X

## (undated) DEVICE — SURGICEL FIBRILLAR HEMOSTAT 2"X4" JJ1962

## (undated) RX ORDER — FENTANYL CITRATE-0.9 % NACL/PF 10 MCG/ML
PLASTIC BAG, INJECTION (ML) INTRAVENOUS
Status: DISPENSED
Start: 2025-04-15

## (undated) RX ORDER — FENTANYL CITRATE 50 UG/ML
INJECTION, SOLUTION INTRAMUSCULAR; INTRAVENOUS
Status: DISPENSED
Start: 2023-12-07

## (undated) RX ORDER — FENTANYL CITRATE 50 UG/ML
INJECTION, SOLUTION INTRAMUSCULAR; INTRAVENOUS
Status: DISPENSED
Start: 2025-04-15

## (undated) RX ORDER — HYDROMORPHONE HYDROCHLORIDE 1 MG/ML
INJECTION, SOLUTION INTRAMUSCULAR; INTRAVENOUS; SUBCUTANEOUS
Status: DISPENSED
Start: 2025-04-15

## (undated) RX ORDER — GLYCOPYRROLATE 0.2 MG/ML
INJECTION, SOLUTION INTRAMUSCULAR; INTRAVENOUS
Status: DISPENSED
Start: 2025-04-15

## (undated) RX ORDER — REMIFENTANIL HYDROCHLORIDE 1 MG/ML
INJECTION, POWDER, LYOPHILIZED, FOR SOLUTION INTRAVENOUS
Status: DISPENSED
Start: 2025-04-15

## (undated) RX ORDER — ACETAMINOPHEN 325 MG/1
TABLET ORAL
Status: DISPENSED
Start: 2025-04-15

## (undated) RX ORDER — ONDANSETRON 2 MG/ML
INJECTION INTRAMUSCULAR; INTRAVENOUS
Status: DISPENSED
Start: 2025-04-15